# Patient Record
Sex: MALE | Race: WHITE | NOT HISPANIC OR LATINO | Employment: OTHER | URBAN - METROPOLITAN AREA
[De-identification: names, ages, dates, MRNs, and addresses within clinical notes are randomized per-mention and may not be internally consistent; named-entity substitution may affect disease eponyms.]

---

## 2017-01-09 ENCOUNTER — TELEPHONE (OUTPATIENT)
Dept: PULMONOLOGY | Facility: CLINIC | Age: 52
End: 2017-01-09

## 2017-01-09 NOTE — TELEPHONE ENCOUNTER
Spoke to Jacqueline with [facility]   Gave information that patient was discharged from hospital with order ventolin rescue inhler : 2(two) puffs every 4-6 hours PRN cough, wheezing and , shortness of breath.    Dr Hunter Menchaca ordered Breo   Patient is to be on both inhalers.   Patient's scripts will be going to [facility] in [place] not  [facility]  (that company is going out of business) per Frankie with [facility] in [place], [place]

## 2017-01-09 NOTE — TELEPHONE ENCOUNTER
----- Message from Hattie Avelar sent at 1/9/2017 10:01 AM CST -----  Jacqueline ([facility]) called regarding a RX that was sent over on the 29th/stated that she need some clarity on the inhaler/pls call back at [phone]

## 2017-03-22 RX ORDER — DICLOFENAC SODIUM 75 MG/1
TABLET, DELAYED RELEASE ORAL
Qty: 60 TABLET | Refills: 0 | OUTPATIENT
Start: 2017-03-22

## 2019-02-22 ENCOUNTER — DOCUMENTATION ONLY (OUTPATIENT)
Dept: FAMILY MEDICINE | Facility: CLINIC | Age: 54
End: 2019-02-22

## 2019-02-22 NOTE — PROGRESS NOTES
Health Maintenance Due   Topic Date Due    Hepatitis C Screening  1965    Lipid Panel  1965    TETANUS VACCINE  07/07/1982    Colonoscopy  07/07/2014    Influenza Vaccine  08/01/2018

## 2019-10-08 ENCOUNTER — HOSPITAL ENCOUNTER (OUTPATIENT)
Facility: HOSPITAL | Age: 54
Discharge: HOME | End: 2019-10-09
Attending: EMERGENCY MEDICINE | Admitting: FAMILY MEDICINE
Payer: MEDICAID

## 2019-10-08 DIAGNOSIS — J44.1 COPD EXACERBATION: Primary | ICD-10-CM

## 2019-10-08 DIAGNOSIS — R06.00 DYSPNEA: ICD-10-CM

## 2019-10-08 PROBLEM — I10 ESSENTIAL HYPERTENSION: Status: ACTIVE | Noted: 2019-10-08

## 2019-10-08 LAB
ALBUMIN SERPL BCP-MCNC: 3.9 G/DL (ref 3.5–5.2)
ALP SERPL-CCNC: 60 U/L (ref 55–135)
ALT SERPL W/O P-5'-P-CCNC: 25 U/L (ref 10–44)
ANION GAP SERPL CALC-SCNC: 7 MMOL/L (ref 8–16)
AST SERPL-CCNC: 21 U/L (ref 10–40)
BASOPHILS # BLD AUTO: 0.02 K/UL (ref 0–0.2)
BASOPHILS NFR BLD: 0.2 % (ref 0–1.9)
BILIRUB SERPL-MCNC: 0.7 MG/DL (ref 0.1–1)
BNP SERPL-MCNC: 360 PG/ML (ref 0–99)
BUN SERPL-MCNC: 15 MG/DL (ref 6–20)
CALCIUM SERPL-MCNC: 8.7 MG/DL (ref 8.7–10.5)
CHLORIDE SERPL-SCNC: 107 MMOL/L (ref 95–110)
CO2 SERPL-SCNC: 26 MMOL/L (ref 23–29)
CREAT SERPL-MCNC: 0.8 MG/DL (ref 0.5–1.4)
D DIMER PPP IA.FEU-MCNC: 0.38 UG/ML FEU
DIFFERENTIAL METHOD: ABNORMAL
EOSINOPHIL # BLD AUTO: 0 K/UL (ref 0–0.5)
EOSINOPHIL NFR BLD: 0 % (ref 0–8)
ERYTHROCYTE [DISTWIDTH] IN BLOOD BY AUTOMATED COUNT: 13.2 % (ref 11.5–14.5)
EST. GFR  (AFRICAN AMERICAN): >60 ML/MIN/1.73 M^2
EST. GFR  (NON AFRICAN AMERICAN): >60 ML/MIN/1.73 M^2
GLUCOSE SERPL-MCNC: 174 MG/DL (ref 70–110)
HCT VFR BLD AUTO: 42.7 % (ref 40–54)
HGB BLD-MCNC: 13.8 G/DL (ref 14–18)
IMM GRANULOCYTES # BLD AUTO: 0.02 K/UL (ref 0–0.04)
IMM GRANULOCYTES NFR BLD AUTO: 0.2 % (ref 0–0.5)
LYMPHOCYTES # BLD AUTO: 0.7 K/UL (ref 1–4.8)
LYMPHOCYTES NFR BLD: 8.2 % (ref 18–48)
MCH RBC QN AUTO: 30.7 PG (ref 27–31)
MCHC RBC AUTO-ENTMCNC: 32.3 G/DL (ref 32–36)
MCV RBC AUTO: 95 FL (ref 82–98)
MONOCYTES # BLD AUTO: 0.1 K/UL (ref 0.3–1)
MONOCYTES NFR BLD: 1.6 % (ref 4–15)
NEUTROPHILS # BLD AUTO: 8 K/UL (ref 1.8–7.7)
NEUTROPHILS NFR BLD: 89.8 % (ref 38–73)
NRBC BLD-RTO: 0 /100 WBC
PLATELET # BLD AUTO: 193 K/UL (ref 150–350)
PMV BLD AUTO: 10.9 FL (ref 9.2–12.9)
POTASSIUM SERPL-SCNC: 4.4 MMOL/L (ref 3.5–5.1)
PROT SERPL-MCNC: 7.1 G/DL (ref 6–8.4)
RBC # BLD AUTO: 4.5 M/UL (ref 4.6–6.2)
SODIUM SERPL-SCNC: 140 MMOL/L (ref 136–145)
TROPONIN I SERPL DL<=0.01 NG/ML-MCNC: <0.03 NG/ML (ref 0.02–0.04)
TSH SERPL DL<=0.005 MIU/L-ACNC: 0.57 UIU/ML (ref 0.34–5.6)
WBC # BLD AUTO: 8.95 K/UL (ref 3.9–12.7)

## 2019-10-08 PROCEDURE — G0378 HOSPITAL OBSERVATION PER HR: HCPCS

## 2019-10-08 PROCEDURE — 63600175 PHARM REV CODE 636 W HCPCS: Performed by: FAMILY MEDICINE

## 2019-10-08 PROCEDURE — 83880 ASSAY OF NATRIURETIC PEPTIDE: CPT

## 2019-10-08 PROCEDURE — 85025 COMPLETE CBC W/AUTO DIFF WBC: CPT

## 2019-10-08 PROCEDURE — 25000242 PHARM REV CODE 250 ALT 637 W/ HCPCS: Performed by: FAMILY MEDICINE

## 2019-10-08 PROCEDURE — 80053 COMPREHEN METABOLIC PANEL: CPT

## 2019-10-08 PROCEDURE — 96372 THER/PROPH/DIAG INJ SC/IM: CPT | Mod: 59

## 2019-10-08 PROCEDURE — 94640 AIRWAY INHALATION TREATMENT: CPT

## 2019-10-08 PROCEDURE — 25000242 PHARM REV CODE 250 ALT 637 W/ HCPCS: Performed by: EMERGENCY MEDICINE

## 2019-10-08 PROCEDURE — 84443 ASSAY THYROID STIM HORMONE: CPT

## 2019-10-08 PROCEDURE — 99285 EMERGENCY DEPT VISIT HI MDM: CPT | Mod: 25

## 2019-10-08 PROCEDURE — 84484 ASSAY OF TROPONIN QUANT: CPT

## 2019-10-08 PROCEDURE — 25000003 PHARM REV CODE 250: Performed by: FAMILY MEDICINE

## 2019-10-08 PROCEDURE — 85379 FIBRIN DEGRADATION QUANT: CPT

## 2019-10-08 PROCEDURE — 96375 TX/PRO/DX INJ NEW DRUG ADDON: CPT

## 2019-10-08 PROCEDURE — 93005 ELECTROCARDIOGRAM TRACING: CPT

## 2019-10-08 RX ORDER — FLUTICASONE FUROATE AND VILANTEROL 200; 25 UG/1; UG/1
1 POWDER RESPIRATORY (INHALATION) DAILY
Status: DISCONTINUED | OUTPATIENT
Start: 2019-10-09 | End: 2019-10-09 | Stop reason: HOSPADM

## 2019-10-08 RX ORDER — ONDANSETRON 4 MG/1
8 TABLET, ORALLY DISINTEGRATING ORAL EVERY 8 HOURS PRN
Status: DISCONTINUED | OUTPATIENT
Start: 2019-10-08 | End: 2019-10-09 | Stop reason: HOSPADM

## 2019-10-08 RX ORDER — IPRATROPIUM BROMIDE AND ALBUTEROL SULFATE 2.5; .5 MG/3ML; MG/3ML
9 SOLUTION RESPIRATORY (INHALATION)
Status: COMPLETED | OUTPATIENT
Start: 2019-10-08 | End: 2019-10-08

## 2019-10-08 RX ORDER — SODIUM CHLORIDE 0.9 % (FLUSH) 0.9 %
10 SYRINGE (ML) INJECTION
Status: DISCONTINUED | OUTPATIENT
Start: 2019-10-08 | End: 2019-10-09 | Stop reason: HOSPADM

## 2019-10-08 RX ORDER — PANTOPRAZOLE SODIUM 40 MG/1
40 TABLET, DELAYED RELEASE ORAL
Status: DISCONTINUED | OUTPATIENT
Start: 2019-10-09 | End: 2019-10-09 | Stop reason: HOSPADM

## 2019-10-08 RX ORDER — ENOXAPARIN SODIUM 100 MG/ML
40 INJECTION SUBCUTANEOUS EVERY 24 HOURS
Status: DISCONTINUED | OUTPATIENT
Start: 2019-10-08 | End: 2019-10-09 | Stop reason: HOSPADM

## 2019-10-08 RX ORDER — IPRATROPIUM BROMIDE AND ALBUTEROL SULFATE 2.5; .5 MG/3ML; MG/3ML
3 SOLUTION RESPIRATORY (INHALATION) EVERY 4 HOURS
Status: DISCONTINUED | OUTPATIENT
Start: 2019-10-08 | End: 2019-10-08

## 2019-10-08 RX ORDER — FLUTICASONE PROPIONATE 50 MCG
2 SPRAY, SUSPENSION (ML) NASAL DAILY PRN
Status: DISCONTINUED | OUTPATIENT
Start: 2019-10-08 | End: 2019-10-09 | Stop reason: HOSPADM

## 2019-10-08 RX ORDER — METHYLPREDNISOLONE SOD SUCC 125 MG
80 VIAL (EA) INJECTION EVERY 8 HOURS
Status: DISCONTINUED | OUTPATIENT
Start: 2019-10-08 | End: 2019-10-09 | Stop reason: HOSPADM

## 2019-10-08 RX ORDER — SERTRALINE HYDROCHLORIDE 50 MG/1
50 TABLET, FILM COATED ORAL NIGHTLY
Status: DISCONTINUED | OUTPATIENT
Start: 2019-10-08 | End: 2019-10-09 | Stop reason: HOSPADM

## 2019-10-08 RX ORDER — ASPIRIN 81 MG/1
81 TABLET ORAL DAILY
Status: DISCONTINUED | OUTPATIENT
Start: 2019-10-08 | End: 2019-10-09 | Stop reason: HOSPADM

## 2019-10-08 RX ORDER — ACETAMINOPHEN 325 MG/1
650 TABLET ORAL EVERY 8 HOURS PRN
Status: DISCONTINUED | OUTPATIENT
Start: 2019-10-08 | End: 2019-10-09 | Stop reason: HOSPADM

## 2019-10-08 RX ORDER — IPRATROPIUM BROMIDE AND ALBUTEROL SULFATE 2.5; .5 MG/3ML; MG/3ML
3 SOLUTION RESPIRATORY (INHALATION) EVERY 4 HOURS
Status: DISCONTINUED | OUTPATIENT
Start: 2019-10-09 | End: 2019-10-09

## 2019-10-08 RX ORDER — HYDROCODONE BITARTRATE AND ACETAMINOPHEN 5; 325 MG/1; MG/1
1 TABLET ORAL EVERY 4 HOURS PRN
Status: DISCONTINUED | OUTPATIENT
Start: 2019-10-08 | End: 2019-10-09 | Stop reason: HOSPADM

## 2019-10-08 RX ORDER — BACLOFEN 10 MG/1
10 TABLET ORAL 3 TIMES DAILY
Status: DISCONTINUED | OUTPATIENT
Start: 2019-10-08 | End: 2019-10-09 | Stop reason: HOSPADM

## 2019-10-08 RX ORDER — POLYETHYLENE GLYCOL 3350 17 G/17G
17 POWDER, FOR SOLUTION ORAL DAILY
Status: DISCONTINUED | OUTPATIENT
Start: 2019-10-09 | End: 2019-10-09 | Stop reason: HOSPADM

## 2019-10-08 RX ORDER — MONTELUKAST SODIUM 10 MG/1
10 TABLET ORAL NIGHTLY
Status: DISCONTINUED | OUTPATIENT
Start: 2019-10-08 | End: 2019-10-09 | Stop reason: HOSPADM

## 2019-10-08 RX ADMIN — ENOXAPARIN SODIUM 40 MG: 100 INJECTION SUBCUTANEOUS at 10:10

## 2019-10-08 RX ADMIN — METHYLPREDNISOLONE SODIUM SUCCINATE 80 MG: 125 INJECTION, POWDER, FOR SOLUTION INTRAMUSCULAR; INTRAVENOUS at 10:10

## 2019-10-08 RX ADMIN — IPRATROPIUM BROMIDE AND ALBUTEROL SULFATE 3 ML: .5; 3 SOLUTION RESPIRATORY (INHALATION) at 11:10

## 2019-10-08 RX ADMIN — SERTRALINE HYDROCHLORIDE 50 MG: 50 TABLET ORAL at 10:10

## 2019-10-08 RX ADMIN — MONTELUKAST SODIUM 10 MG: 10 TABLET, COATED ORAL at 10:10

## 2019-10-08 RX ADMIN — ASPIRIN 81 MG: 81 TABLET, DELAYED RELEASE ORAL at 10:10

## 2019-10-08 RX ADMIN — IPRATROPIUM BROMIDE AND ALBUTEROL SULFATE 9 ML: .5; 3 SOLUTION RESPIRATORY (INHALATION) at 05:10

## 2019-10-08 RX ADMIN — BACLOFEN 10 MG: 10 TABLET ORAL at 10:10

## 2019-10-08 RX ADMIN — IBUPROFEN 600 MG: 400 TABLET, FILM COATED ORAL at 10:10

## 2019-10-08 NOTE — H&P
[facility] Medicine  History & Physical    Patient Name: Renard Jimenez  MRN: [id number]  Admission Date: 10/8/2019  Attending Physician: Marline Kate MD  Primary Care Provider: Lisa Olivera MD         Patient information was obtained from patient and ER records.     Subjective:     Principal Problem:<principal problem not specified>    Chief Complaint:   Chief Complaint   Patient presents with    Shortness of Breath        HPI:  Patient was in the custody of [place] police department when he presented to the emergency room with complaints of shortness of breath.  Patient reports that over the past 48 hr he has not had his medication.  He reports having increasing shortness of breath that started this morning.  He admits that he is on chronic oxygen therapy but has not had his oxygen over the past 48 hr.  He admits that he was using his inhalers more frequently prior to his incarceration.  He denies having any chest pain shortness of breath with the symptoms.  He admits to a productive cough.  He denies having any fever.    Past Medical History:   Diagnosis Date    Asthma     CHF (congestive heart failure)     COPD (chronic obstructive pulmonary disease)     Hypertension     MI, old        Past Surgical History:   Procedure Laterality Date    TONSILLECTOMY         Review of patient's allergies indicates:   Allergen Reactions    Maple flavor Other (See Comments)     Throat swelling         No current facility-administered medications on file prior to encounter.      Current Outpatient Medications on File Prior to Encounter   Medication Sig    albuterol-ipratropium 2.5mg-0.5mg/3mL (DUO-NEB) 0.5 mg-3 mg(2.5 mg base)/3 mL nebulizer solution Take 3 mLs by nebulization every 4 (four) hours.    aspirin (ECOTRIN) 81 MG EC tablet Take 81 mg by mouth once daily.    baclofen (LIORESAL) 10 MG tablet Take 1 tablet (10 mg total) by mouth 3 (three) times daily.    diclofenac (VOLTAREN)  75 MG EC tablet Take 75 mg by mouth 2 (two) times daily.    fluticasone (FLONASE) 50 mcg/actuation nasal spray 2 sprays by Each Nare route once daily.    fluticasone-vilanterol (BREO ELLIPTA) 200-25 mcg/dose DsDv diskus inhaler Inhale 1 puff into the lungs once daily.    ipratropium (ATROVENT) 0.02 % nebulizer solution Take 2.5 mLs (500 mcg total) by nebulization 3 (three) times daily.    montelukast (SINGULAIR) 10 mg tablet Take 1 tablet (10 mg total) by mouth every evening.    pantoprazole (PROTONIX) 40 MG tablet Take 40 mg by mouth once daily.    predniSONE (DELTASONE) 20 MG tablet 3 for 3 days then 2 for 3 days then one for 3 days and repeat for breathing problems    sertraline (ZOLOFT) 50 MG tablet Take 50 mg by mouth every evening.     Family History     None        Tobacco Use    Smoking status: Former Smoker     Packs/day: 0.00     Years: 30.00     Pack years: 0.00   Substance and Sexual Activity    Alcohol use: No     Comment: occ    Drug use: No    Sexual activity: Yes     Partners: Female     Review of Systems   Constitutional: Negative.    HENT: Negative.    Eyes: Negative.    Respiratory: Positive for shortness of breath and wheezing.    Cardiovascular: Negative.    Gastrointestinal: Negative.    Endocrine: Negative.    Genitourinary: Negative.    Musculoskeletal: Negative.    Skin: Negative.    Allergic/Immunologic: Negative.    Neurological: Negative.    Hematological: Negative.    Psychiatric/Behavioral: Negative.    All other systems reviewed and are negative.    Objective:     Vital Signs (Most Recent):  Temp: 98.8 °F (37.1 °C) (10/08/19 1454)  Pulse: 79 (10/08/19 1738)  Resp: 18 (10/08/19 1738)  BP: (!) 164/74 (10/08/19 1731)  SpO2: 98 % (10/08/19 1738) Vital Signs (24h Range):  Temp:  [98.8 °F (37.1 °C)] 98.8 °F (37.1 °C)  Pulse:  [70-93] 79  Resp:  [15-39] 18  SpO2:  [96 %-99 %] 98 %  BP: (158-190)/() 164/74     Weight: 88.5 kg (195 lb)  Body mass index is 27.98  kg/m².    Physical Exam   Constitutional: He is oriented to person, place, and time. He appears well-developed and well-nourished.   HENT:   Head: Normocephalic and atraumatic.   Right Ear: External ear normal.   Left Ear: External ear normal.   Eyes: Pupils are equal, round, and reactive to light. EOM are normal.   Neck: Normal range of motion. Neck supple.   Cardiovascular: Normal rate, regular rhythm, normal heart sounds and intact distal pulses.   Pulmonary/Chest: Effort normal. He has wheezes.   Abdominal: Soft. Bowel sounds are normal. There is no tenderness. There is no rebound and no guarding.   Musculoskeletal: Normal range of motion.   Neurological: He is alert and oriented to person, place, and time.   Skin: Skin is warm and dry.   Psychiatric: He has a normal mood and affect. His behavior is normal. Judgment and thought content normal.   Nursing note and vitals reviewed.        CRANIAL NERVES     CN III, IV, VI   Pupils are equal, round, and reactive to light.  Extraocular motions are normal.        Significant Labs:   CBC:   Recent Labs   Lab 10/08/19  1629   WBC 8.95   HGB 13.8*   HCT 42.7        CMP:   Recent Labs   Lab 10/08/19  1629      K 4.4      CO2 26   *   BUN 15   CREATININE 0.8   CALCIUM 8.7   PROT 7.1   ALBUMIN 3.9   BILITOT 0.7   ALKPHOS 60   AST 21   ALT 25   ANIONGAP 7*   EGFRNONAA >60.0     Troponin:   Recent Labs   Lab 10/08/19  1629   TROPONINI <0.030     All pertinent labs within the past 24 hours have been reviewed.    Significant Imaging: I have reviewed all pertinent imaging results/findings within the past 24 hours.    Assessment/Plan:     Essential hypertension  Repeat CBC and BMP in the am      Acute and chronic respiratory failure with hypoxia  Patient to continue with neb treatments and oxygen therapy      Pulmonary emphysema  As above      Acute exacerbation of chronic obstructive pulmonary disease (COPD)  Resumed patient's chronic oxygen therapy.  duonebs q 4 hours. Solumedrol 80mg IV q 8 hours        VTE Risk Mitigation (From admission, onward)         Ordered     enoxaparin injection 40 mg  Daily      10/08/19 1846     IP VTE HIGH RISK PATIENT  Once      10/08/19 1846                   Marline Kate MD  Department of Hospital Medicine   [facility]

## 2019-10-08 NOTE — ED PROVIDER NOTES
Encounter Date: 10/8/2019       History     Chief Complaint   Patient presents with    Shortness of Breath     54-year-old male who has a history of asthma, CHF, COPD, hypertension and coronary artery disease, and who is currently in [place] police custody, was brought from the assisted for complaints of shortness of breath that suddenly began 30 min prior to arrival while walking.  The patient denies any fever.  He states he has had some cough productive of yellow sputum.  He has admits to stop smoking for now half months ago and prior to that time smoked 1 pack daily.  He readily admits to wheezing.  No complaints of palpitations.  The patient denies any dependent edema. No orthopnea PND but does have exertional dyspnea.  He states he supposed to be on 2 L of oxygen continuously but has not been on O2 nausea received any of his medications since he has been incarcerated last 48 hr.        Review of patient's allergies indicates:   Allergen Reactions    Maple flavor Other (See Comments)     Throat swelling       Past Medical History:   Diagnosis Date    Asthma     CHF (congestive heart failure)     COPD (chronic obstructive pulmonary disease)     Hypertension     MI, old      Past Surgical History:   Procedure Laterality Date    TONSILLECTOMY       No family history on file.  Social History     Tobacco Use    Smoking status: Former Smoker     Packs/day: 0.00     Years: 30.00     Pack years: 0.00   Substance Use Topics    Alcohol use: No     Comment: occ    Drug use: No     Review of Systems   Constitutional: Negative for chills, diaphoresis and fever.   HENT: Positive for congestion. Negative for ear pain, rhinorrhea, sinus pressure, sinus pain, sore throat and trouble swallowing.    Eyes: Negative for pain.   Respiratory: Positive for cough, shortness of breath and wheezing. Negative for chest tightness and stridor.    Cardiovascular: Negative for chest pain.   Gastrointestinal: Negative for abdominal pain,  constipation, diarrhea, nausea and vomiting.   Genitourinary: Negative for flank pain, frequency and hematuria.   Skin: Negative for pallor, rash and wound.   Neurological: Positive for headaches.   All other systems reviewed and are negative.      Physical Exam     Initial Vitals [10/08/19 1454]   BP Pulse Resp Temp SpO2   (!) 190/104 93 20 98.8 °F (37.1 °C) 98 %      MAP       --         Physical Exam    Constitutional: He appears well-developed and well-nourished. He is not diaphoretic. No distress.   HENT:   Head: Normocephalic and atraumatic.   Right Ear: External ear normal.   Left Ear: External ear normal.   Nose: Nose normal.   Mouth/Throat: Oropharynx is clear and moist.   Eyes: Conjunctivae and EOM are normal. Pupils are equal, round, and reactive to light. Right eye exhibits no discharge. Left eye exhibits no discharge. No scleral icterus.   Neck: Normal range of motion. Neck supple. No tracheal deviation present. No JVD present.   Cardiovascular: Normal rate, regular rhythm, normal heart sounds and intact distal pulses. Exam reveals no gallop and no friction rub.    No murmur heard.  Pulmonary/Chest: No accessory muscle usage or stridor. Tachypnea noted. No bradypnea. No respiratory distress. He has wheezes in the right upper field, the right middle field, the right lower field, the left upper field, the left middle field and the left lower field. He has no rhonchi. He has no rales. He exhibits no tenderness, no edema, no swelling and no retraction.   Abdominal: Soft. Bowel sounds are normal. He exhibits no distension and no mass. There is no tenderness. There is no rebound and no guarding.   Genitourinary: Penis normal.   Musculoskeletal: Normal range of motion. He exhibits no edema or tenderness.        Right lower leg: He exhibits no edema.        Left lower leg: He exhibits no edema.   Lymphadenopathy:     He has no cervical adenopathy.   Neurological: He is alert and oriented to person, place, and  time. He has normal strength and normal reflexes. No cranial nerve deficit or sensory deficit. GCS score is 15. GCS eye subscore is 4. GCS verbal subscore is 5. GCS motor subscore is 6.   Skin: Skin is warm and dry. Capillary refill takes less than 2 seconds. No rash noted. No erythema. No pallor.   Psychiatric: He has a normal mood and affect. His behavior is normal. Judgment and thought content normal.         ED Course   Procedures  Labs Reviewed   CBC W/ AUTO DIFFERENTIAL   COMPREHENSIVE METABOLIC PANEL   D DIMER, QUANTITATIVE   TROPONIN I   B-TYPE NATRIURETIC PEPTIDE   TSH          Imaging Results    None                       Attending Attestation:             Attending ED Notes:   This patient presented with shortness of breath from senior living was COPD and CHF, has a chest x-ray showing no evidence of any infiltrate nor any evidence this pulmonary vascular congestion.  The patient's labs showed a TSH of 0.57, blood sugar 174, BNP of 360 and a normal troponin and D-dimer.  The patient received an in the pre-hospital setting a DuoNeb treatment as well as Solu-Medrol and receive further DuoNeb treatments in the ED.  The patient's breath sounds did improve but the concern is that the patient does not have the ability to maintain oxygen and around the clock medication and incarcerated setting.  Therefore he will be seen by Hospital Medicine for probable admission.             Clinical Impression:       ICD-10-CM ICD-9-CM   1. COPD exacerbation J44.1 491.21   2. Dyspnea R06.00 786.09                                Cornel Saldaña Jr., MD  10/08/19 5142

## 2019-10-08 NOTE — SUBJECTIVE & OBJECTIVE
Past Medical History:   Diagnosis Date    Asthma     CHF (congestive heart failure)     COPD (chronic obstructive pulmonary disease)     Hypertension     MI, old        Past Surgical History:   Procedure Laterality Date    TONSILLECTOMY         Review of patient's allergies indicates:   Allergen Reactions    Maple flavor Other (See Comments)     Throat swelling         No current facility-administered medications on file prior to encounter.      Current Outpatient Medications on File Prior to Encounter   Medication Sig    albuterol-ipratropium 2.5mg-0.5mg/3mL (DUO-NEB) 0.5 mg-3 mg(2.5 mg base)/3 mL nebulizer solution Take 3 mLs by nebulization every 4 (four) hours.    aspirin (ECOTRIN) 81 MG EC tablet Take 81 mg by mouth once daily.    baclofen (LIORESAL) 10 MG tablet Take 1 tablet (10 mg total) by mouth 3 (three) times daily.    diclofenac (VOLTAREN) 75 MG EC tablet Take 75 mg by mouth 2 (two) times daily.    fluticasone (FLONASE) 50 mcg/actuation nasal spray 2 sprays by Each Nare route once daily.    fluticasone-vilanterol (BREO ELLIPTA) 200-25 mcg/dose DsDv diskus inhaler Inhale 1 puff into the lungs once daily.    ipratropium (ATROVENT) 0.02 % nebulizer solution Take 2.5 mLs (500 mcg total) by nebulization 3 (three) times daily.    montelukast (SINGULAIR) 10 mg tablet Take 1 tablet (10 mg total) by mouth every evening.    pantoprazole (PROTONIX) 40 MG tablet Take 40 mg by mouth once daily.    predniSONE (DELTASONE) 20 MG tablet 3 for 3 days then 2 for 3 days then one for 3 days and repeat for breathing problems    sertraline (ZOLOFT) 50 MG tablet Take 50 mg by mouth every evening.     Family History     None        Tobacco Use    Smoking status: Former Smoker     Packs/day: 0.00     Years: 30.00     Pack years: 0.00   Substance and Sexual Activity    Alcohol use: No     Comment: occ    Drug use: No    Sexual activity: Yes     Partners: Female     Review of Systems   Constitutional:  Negative.    HENT: Negative.    Eyes: Negative.    Respiratory: Positive for shortness of breath and wheezing.    Cardiovascular: Negative.    Gastrointestinal: Negative.    Endocrine: Negative.    Genitourinary: Negative.    Musculoskeletal: Negative.    Skin: Negative.    Allergic/Immunologic: Negative.    Neurological: Negative.    Hematological: Negative.    Psychiatric/Behavioral: Negative.    All other systems reviewed and are negative.    Objective:     Vital Signs (Most Recent):  Temp: 98.8 °F (37.1 °C) (10/08/19 1454)  Pulse: 79 (10/08/19 1738)  Resp: 18 (10/08/19 1738)  BP: (!) 164/74 (10/08/19 1731)  SpO2: 98 % (10/08/19 1738) Vital Signs (24h Range):  Temp:  [98.8 °F (37.1 °C)] 98.8 °F (37.1 °C)  Pulse:  [70-93] 79  Resp:  [15-39] 18  SpO2:  [96 %-99 %] 98 %  BP: (158-190)/() 164/74     Weight: 88.5 kg (195 lb)  Body mass index is 27.98 kg/m².    Physical Exam   Constitutional: He is oriented to person, place, and time. He appears well-developed and well-nourished.   HENT:   Head: Normocephalic and atraumatic.   Right Ear: External ear normal.   Left Ear: External ear normal.   Eyes: Pupils are equal, round, and reactive to light. EOM are normal.   Neck: Normal range of motion. Neck supple.   Cardiovascular: Normal rate, regular rhythm, normal heart sounds and intact distal pulses.   Pulmonary/Chest: Effort normal. He has wheezes.   Abdominal: Soft. Bowel sounds are normal. There is no tenderness. There is no rebound and no guarding.   Musculoskeletal: Normal range of motion.   Neurological: He is alert and oriented to person, place, and time.   Skin: Skin is warm and dry.   Psychiatric: He has a normal mood and affect. His behavior is normal. Judgment and thought content normal.   Nursing note and vitals reviewed.        CRANIAL NERVES     CN III, IV, VI   Pupils are equal, round, and reactive to light.  Extraocular motions are normal.        Significant Labs:   CBC:   Recent Labs   Lab  10/08/19  1629   WBC 8.95   HGB 13.8*   HCT 42.7        CMP:   Recent Labs   Lab 10/08/19  1629      K 4.4      CO2 26   *   BUN 15   CREATININE 0.8   CALCIUM 8.7   PROT 7.1   ALBUMIN 3.9   BILITOT 0.7   ALKPHOS 60   AST 21   ALT 25   ANIONGAP 7*   EGFRNONAA >60.0     Troponin:   Recent Labs   Lab 10/08/19  1629   TROPONINI <0.030     All pertinent labs within the past 24 hours have been reviewed.    Significant Imaging: I have reviewed all pertinent imaging results/findings within the past 24 hours.

## 2019-10-08 NOTE — HOSPITAL COURSE
Patient admitted with SOB from senior care.  He has COPD and reports he is on 2L NC at home for his COPD which he does not have at senior care.  He was found to be wheezing on exam.  Patient was admitted, started on IV steroids and scheduled nebs.  Wheezing improved, he is 96% on room air at rest and is ambulating watson without issue.  He does not have his inhalers at senior care and thus I have prescribed Advair, Albuterol rescue inhaler and course of po steroids.  I changed Breo to Advair as Advair is covered by his insurance.  Return precautions given.  Recommended O2 to keep saturations greater than 88% if needed.

## 2019-10-08 NOTE — ED NOTES
Pt sitting up on side of bed, aaox4, rrr unlabored, nadn, given urinal as requested, denies any other needs

## 2019-10-09 VITALS
WEIGHT: 244.5 LBS | HEIGHT: 69 IN | RESPIRATION RATE: 18 BRPM | DIASTOLIC BLOOD PRESSURE: 86 MMHG | OXYGEN SATURATION: 95 % | SYSTOLIC BLOOD PRESSURE: 177 MMHG | TEMPERATURE: 98 F | HEART RATE: 95 BPM | BODY MASS INDEX: 36.21 KG/M2

## 2019-10-09 PROBLEM — J44.1 COPD EXACERBATION: Status: RESOLVED | Noted: 2019-10-08 | Resolved: 2019-10-09

## 2019-10-09 LAB
ANION GAP SERPL CALC-SCNC: 13 MMOL/L (ref 8–16)
BASOPHILS # BLD AUTO: 0.01 K/UL (ref 0–0.2)
BASOPHILS NFR BLD: 0.1 % (ref 0–1.9)
BUN SERPL-MCNC: 24 MG/DL (ref 6–20)
CALCIUM SERPL-MCNC: 8.8 MG/DL (ref 8.7–10.5)
CHLORIDE SERPL-SCNC: 101 MMOL/L (ref 95–110)
CO2 SERPL-SCNC: 23 MMOL/L (ref 23–29)
CREAT SERPL-MCNC: 1.2 MG/DL (ref 0.5–1.4)
DIFFERENTIAL METHOD: ABNORMAL
EOSINOPHIL # BLD AUTO: 0 K/UL (ref 0–0.5)
EOSINOPHIL NFR BLD: 0 % (ref 0–8)
ERYTHROCYTE [DISTWIDTH] IN BLOOD BY AUTOMATED COUNT: 13.2 % (ref 11.5–14.5)
EST. GFR  (AFRICAN AMERICAN): >60 ML/MIN/1.73 M^2
EST. GFR  (NON AFRICAN AMERICAN): >60 ML/MIN/1.73 M^2
GLUCOSE SERPL-MCNC: 235 MG/DL (ref 70–110)
HCT VFR BLD AUTO: 41.4 % (ref 40–54)
HGB BLD-MCNC: 13.5 G/DL (ref 14–18)
IMM GRANULOCYTES # BLD AUTO: 0.06 K/UL (ref 0–0.04)
IMM GRANULOCYTES NFR BLD AUTO: 0.4 % (ref 0–0.5)
LYMPHOCYTES # BLD AUTO: 0.8 K/UL (ref 1–4.8)
LYMPHOCYTES NFR BLD: 4.9 % (ref 18–48)
MAGNESIUM SERPL-MCNC: 1.8 MG/DL (ref 1.6–2.6)
MCH RBC QN AUTO: 30.9 PG (ref 27–31)
MCHC RBC AUTO-ENTMCNC: 32.6 G/DL (ref 32–36)
MCV RBC AUTO: 95 FL (ref 82–98)
MONOCYTES # BLD AUTO: 0.3 K/UL (ref 0.3–1)
MONOCYTES NFR BLD: 1.8 % (ref 4–15)
NEUTROPHILS # BLD AUTO: 14.7 K/UL (ref 1.8–7.7)
NEUTROPHILS NFR BLD: 92.8 % (ref 38–73)
NRBC BLD-RTO: 0 /100 WBC
PHOSPHATE SERPL-MCNC: 2.7 MG/DL (ref 2.7–4.5)
PLATELET # BLD AUTO: 213 K/UL (ref 150–350)
PMV BLD AUTO: 10.6 FL (ref 9.2–12.9)
POTASSIUM SERPL-SCNC: 4.2 MMOL/L (ref 3.5–5.1)
RBC # BLD AUTO: 4.37 M/UL (ref 4.6–6.2)
SODIUM SERPL-SCNC: 137 MMOL/L (ref 136–145)
WBC # BLD AUTO: 15.78 K/UL (ref 3.9–12.7)

## 2019-10-09 PROCEDURE — G0378 HOSPITAL OBSERVATION PER HR: HCPCS

## 2019-10-09 PROCEDURE — 83735 ASSAY OF MAGNESIUM: CPT

## 2019-10-09 PROCEDURE — 80048 BASIC METABOLIC PNL TOTAL CA: CPT

## 2019-10-09 PROCEDURE — 25000242 PHARM REV CODE 250 ALT 637 W/ HCPCS: Performed by: FAMILY MEDICINE

## 2019-10-09 PROCEDURE — 85025 COMPLETE CBC W/AUTO DIFF WBC: CPT

## 2019-10-09 PROCEDURE — 63600175 PHARM REV CODE 636 W HCPCS: Performed by: FAMILY MEDICINE

## 2019-10-09 PROCEDURE — 36415 COLL VENOUS BLD VENIPUNCTURE: CPT

## 2019-10-09 PROCEDURE — 25000242 PHARM REV CODE 250 ALT 637 W/ HCPCS: Performed by: INTERNAL MEDICINE

## 2019-10-09 PROCEDURE — 27000221 HC OXYGEN, UP TO 24 HOURS

## 2019-10-09 PROCEDURE — 94640 AIRWAY INHALATION TREATMENT: CPT

## 2019-10-09 PROCEDURE — 25000003 PHARM REV CODE 250: Performed by: FAMILY MEDICINE

## 2019-10-09 PROCEDURE — 84100 ASSAY OF PHOSPHORUS: CPT

## 2019-10-09 PROCEDURE — 96376 TX/PRO/DX INJ SAME DRUG ADON: CPT

## 2019-10-09 PROCEDURE — 94761 N-INVAS EAR/PLS OXIMETRY MLT: CPT

## 2019-10-09 PROCEDURE — 96365 THER/PROPH/DIAG IV INF INIT: CPT

## 2019-10-09 RX ORDER — FLUTICASONE PROPIONATE AND SALMETEROL 100; 50 UG/1; UG/1
1 POWDER RESPIRATORY (INHALATION) 2 TIMES DAILY
Qty: 1 EACH | Refills: 0 | Status: SHIPPED | OUTPATIENT
Start: 2019-10-09 | End: 2022-04-11

## 2019-10-09 RX ORDER — ALBUTEROL SULFATE 90 UG/1
2 AEROSOL, METERED RESPIRATORY (INHALATION) EVERY 6 HOURS PRN
Qty: 18 G | Refills: 0 | Status: SHIPPED | OUTPATIENT
Start: 2019-10-09 | End: 2022-04-11

## 2019-10-09 RX ORDER — IPRATROPIUM BROMIDE AND ALBUTEROL SULFATE 2.5; .5 MG/3ML; MG/3ML
3 SOLUTION RESPIRATORY (INHALATION)
Status: DISCONTINUED | OUTPATIENT
Start: 2019-10-09 | End: 2019-10-09 | Stop reason: HOSPADM

## 2019-10-09 RX ORDER — PREDNISONE 20 MG/1
40 TABLET ORAL DAILY
Qty: 10 TABLET | Refills: 0 | Status: SHIPPED | OUTPATIENT
Start: 2019-10-09 | End: 2022-04-11

## 2019-10-09 RX ADMIN — PANTOPRAZOLE SODIUM 40 MG: 40 TABLET, DELAYED RELEASE ORAL at 06:10

## 2019-10-09 RX ADMIN — IBUPROFEN 600 MG: 400 TABLET, FILM COATED ORAL at 08:10

## 2019-10-09 RX ADMIN — FLUTICASONE FUROATE AND VILANTEROL TRIFENATATE 1 PUFF: 200; 25 POWDER RESPIRATORY (INHALATION) at 07:10

## 2019-10-09 RX ADMIN — METHYLPREDNISOLONE SODIUM SUCCINATE 80 MG: 125 INJECTION, POWDER, FOR SOLUTION INTRAMUSCULAR; INTRAVENOUS at 01:10

## 2019-10-09 RX ADMIN — BACLOFEN 10 MG: 10 TABLET ORAL at 08:10

## 2019-10-09 RX ADMIN — ASPIRIN 81 MG: 81 TABLET, DELAYED RELEASE ORAL at 08:10

## 2019-10-09 RX ADMIN — IPRATROPIUM BROMIDE AND ALBUTEROL SULFATE 3 ML: .5; 3 SOLUTION RESPIRATORY (INHALATION) at 01:10

## 2019-10-09 RX ADMIN — AZITHROMYCIN MONOHYDRATE 500 MG: 500 INJECTION, POWDER, LYOPHILIZED, FOR SOLUTION INTRAVENOUS at 01:10

## 2019-10-09 RX ADMIN — IPRATROPIUM BROMIDE AND ALBUTEROL SULFATE 3 ML: .5; 3 SOLUTION RESPIRATORY (INHALATION) at 03:10

## 2019-10-09 RX ADMIN — IBUPROFEN 600 MG: 400 TABLET, FILM COATED ORAL at 01:10

## 2019-10-09 RX ADMIN — IPRATROPIUM BROMIDE AND ALBUTEROL SULFATE 3 ML: .5; 3 SOLUTION RESPIRATORY (INHALATION) at 07:10

## 2019-10-09 RX ADMIN — METHYLPREDNISOLONE SODIUM SUCCINATE 80 MG: 125 INJECTION, POWDER, FOR SOLUTION INTRAMUSCULAR; INTRAVENOUS at 06:10

## 2019-10-09 NOTE — HPI
Have patient presents to the emergency room from [facility].  She complains of having shortness of breath and productive cough that started this morning.  He admits to having wheezing.  He reports not having any of his pulmonary medications over the past 48 hr secondary to his incarceration.  Patient reports that he is on chronic oxygen therapy at 2 L but has not had his oxygen in the past 48 hr.  He denies having any fever with the symptoms.  He admits that he was using his inhaler prior to his incarceration for increased wheezing.  He denies having any chest pain or nausea vomiting with the symptoms

## 2019-10-09 NOTE — DISCHARGE SUMMARY
[facility]  Discharge Summary      Patient Name: Renard Jimenez  MRN: [id number]  Admission Date: 10/8/2019  Hospital Length of Stay: 0 days  Discharge Date and Time: 10/9/2019  3:18 PM  Attending Physician: No att. providers found   Discharging Provider: Alcides Casey MD  Primary Care Provider: Lisa Olivera MD      HPI:   Have patient presents to the emergency room from [place] at long term.  She complains of having shortness of breath and productive cough that started this morning.  He admits to having wheezing.  He reports not having any of his pulmonary medications over the past 48 hr secondary to his incarceration.  Patient reports that he is on chronic oxygen therapy at 2 L but has not had his oxygen in the past 48 hr.  He denies having any fever with the symptoms.  He admits that he was using his inhaler prior to his incarceration for increased wheezing.  He denies having any chest pain or nausea vomiting with the symptoms    * No surgery found *      Hospital Course:   Patient admitted with SOB from long term.  He has COPD and reports he is on 2L NC at home for his COPD which he does not have at long term.  He was found to be wheezing on exam.  Patient was admitted, started on IV steroids and scheduled nebs.  Wheezing improved, he is 96% on room air at rest and is ambulating watson without issue.  He does not have his inhalers at long term and thus I have prescribed Advair, Albuterol rescue inhaler and course of po steroids.  I changed Breo to Advair as Advair is covered by his insurance.  Return precautions given.  Recommended O2 to keep saturations greater than 88% if needed.     Consults:     No new Assessment & Plan notes have been filed under this hospital service since the last note was generated.  Service: Hospital Medicine    Final Active Diagnoses:    Diagnosis Date Noted POA    Essential hypertension [I10] 10/08/2019 Yes    Pulmonary emphysema [J43.9] 12/29/2016 Yes       Problems Resolved During this Admission:    Diagnosis Date Noted Date Resolved POA    PRINCIPAL PROBLEM:  Acute and chronic respiratory failure with hypoxia [J96.21] 12/29/2016 10/09/2019 Yes    COPD exacerbation [J44.1] 10/08/2019 10/09/2019 Yes    Acute exacerbation of chronic obstructive pulmonary disease (COPD) [J44.1] 08/28/2013 10/09/2019 Yes       Discharged Condition: good    Disposition: Home or Self Care    Follow Up:  Follow-up Information     Lisa Olivera MD In 2 weeks.    Specialty:  Family Medicine  Contact information:  [address] [place] [address]  [facility]  [facility] [address]  [phone]                 Patient Instructions:      Diet Adult Regular     Notify your health care provider if you experience any of the following:  temperature >100.4     Notify your health care provider if you experience any of the following:  persistent nausea and vomiting or diarrhea     Notify your health care provider if you experience any of the following:  severe uncontrolled pain     Notify your health care provider if you experience any of the following:  difficulty breathing or increased cough     Activity as tolerated       Significant Diagnostic Studies: Labs:   CMP   Recent Labs   Lab 10/08/19  1629 10/09/19  0541    137   K 4.4 4.2    101   CO2 26 23   * 235*   BUN 15 24*   CREATININE 0.8 1.2   CALCIUM 8.7 8.8   PROT 7.1  --    ALBUMIN 3.9  --    BILITOT 0.7  --    ALKPHOS 60  --    AST 21  --    ALT 25  --    ANIONGAP 7* 13   ESTGFRAFRICA >60.0 >60.0   EGFRNONAA >60.0 >60.0    and CBC   Recent Labs   Lab 10/08/19  1629 10/09/19  0541   WBC 8.95 15.78*   HGB 13.8* 13.5*   HCT 42.7 41.4    213       Pending Diagnostic Studies:     None         Medications:  Reconciled Home Medications:      Medication List      START taking these medications    albuterol 90 mcg/actuation inhaler  Commonly known as:  PROVENTIL HFA  Inhale 2 puffs into the lungs every 6 (six) hours as  needed for Wheezing. Rescue     fluticasone-salmeterol 100-50 mcg/dose 100-50 mcg/dose diskus inhaler  Commonly known as:  ADVAIR DISKUS  Inhale 1 puff into the lungs 2 (two) times daily. Controller  Replaces:  fluticasone furoate-vilanterol 200-25 mcg/dose Dsdv diskus inhaler        CHANGE how you take these medications    predniSONE 20 MG tablet  Commonly known as:  DELTASONE  Take 2 tablets (40 mg total) by mouth once daily.  What changed:    · how much to take  · how to take this  · when to take this  · additional instructions        CONTINUE taking these medications    albuterol-ipratropium 2.5 mg-0.5 mg/3 mL nebulizer solution  Commonly known as:  DUO-NEB  Take 3 mLs by nebulization every 4 (four) hours.     aspirin 81 MG EC tablet  Commonly known as:  ECOTRIN  Take 81 mg by mouth once daily.     baclofen 10 MG tablet  Commonly known as:  LIORESAL  Take 1 tablet (10 mg total) by mouth 3 (three) times daily.     diclofenac 75 MG EC tablet  Commonly known as:  VOLTAREN  Take 75 mg by mouth 2 (two) times daily.     fluticasone propionate 50 mcg/actuation nasal spray  Commonly known as:  FLONASE  2 sprays by Each Nare route once daily.     ipratropium 0.02 % nebulizer solution  Commonly known as:  ATROVENT  Take 2.5 mLs (500 mcg total) by nebulization 3 (three) times daily.     montelukast 10 mg tablet  Commonly known as:  SINGULAIR  Take 1 tablet (10 mg total) by mouth every evening.     pantoprazole 40 MG tablet  Commonly known as:  PROTONIX  Take 40 mg by mouth once daily.     sertraline 50 MG tablet  Commonly known as:  ZOLOFT  Take 50 mg by mouth every evening.        STOP taking these medications    fluticasone furoate-vilanterol 200-25 mcg/dose Dsdv diskus inhaler  Commonly known as:  BREO ELLIPTA  Replaced by:  fluticasone-salmeterol 100-50 mcg/dose 100-50 mcg/dose diskus inhaler            Indwelling Lines/Drains at time of discharge:   Lines/Drains/Airways     None                 Time spent on the  discharge of patient: 28 minutes  Patient was seen and examined on the date of discharge and determined to be suitable for discharge.         Alcides Casey MD  Department of Hospital Medicine  [facility] hospital

## 2019-10-09 NOTE — PLAN OF CARE
10/08/19 8223   Patient Assessment/Suction   Level of Consciousness (AVPU) alert   All Lung Fields Breath Sounds clear;equal bilaterally   PRE-TX-O2   O2 Device (Oxygen Therapy) room air   SpO2 97 %   Pulse 85   Resp 16   Aerosol Therapy   $ Aerosol Therapy Charges Aerosol Treatment   Respiratory Treatment Status (SVN) given   Treatment Route (SVN) mask   Patient Position (SVN) semi-Mijares's   Post Treatment Assessment (SVN) breath sounds unchanged   Signs of Intolerance (SVN) none   Breath Sounds Post-Respiratory Treatment   Post-treatment Heart Rate (beats/min) 80   Post-treatment Resp Rate (breaths/min) 16

## 2019-10-09 NOTE — PLAN OF CARE
Problem: Adjustment to Illness COPD (Chronic Obstructive Pulmonary Disease)  Goal: Optimal Chronic Illness Coping  Outcome: Ongoing, Progressing     Problem: Functional Ability Impaired COPD (Chronic Obstructive Pulmonary Disease)  Goal: Optimal Level of Functional [place]  Outcome: Ongoing, Progressing     Problem: Infection COPD (Chronic Obstructive Pulmonary Disease)  Goal: Absence of Infection Signs/Symptoms  Outcome: Ongoing, Progressing

## 2019-10-10 NOTE — CARE UPDATE
Readmission Prevention    PC was requested to provide a PCP appt upon dc for pt.  PC attempted to call pt's available phone numbers.  [phone] was no longer available.  [phone] was no longer in service. Pt was documented as in longterm prior to ED.  PC unable to get in touch with pt; PC will mail to available address [facility] information to make an appt once he is released.    Melissa CARTER, LPN    Transitions of Care Program  10/10/2019, 3:16 pm

## 2022-04-11 ENCOUNTER — ANESTHESIA EVENT (OUTPATIENT)
Dept: SURGERY | Facility: HOSPITAL | Age: 57
End: 2022-04-11
Payer: MEDICAID

## 2022-04-11 ENCOUNTER — HOSPITAL ENCOUNTER (OUTPATIENT)
Facility: HOSPITAL | Age: 57
Discharge: HOME | End: 2022-04-12
Attending: EMERGENCY MEDICINE | Admitting: HOSPITALIST
Payer: MEDICAID

## 2022-04-11 ENCOUNTER — ANESTHESIA (OUTPATIENT)
Dept: SURGERY | Facility: HOSPITAL | Age: 57
End: 2022-04-11
Payer: MEDICAID

## 2022-04-11 DIAGNOSIS — K81.0 ACUTE CHOLECYSTITIS: Primary | ICD-10-CM

## 2022-04-11 DIAGNOSIS — R06.02 SOB (SHORTNESS OF BREATH): ICD-10-CM

## 2022-04-11 DIAGNOSIS — R07.9 CHEST PAIN: ICD-10-CM

## 2022-04-11 DIAGNOSIS — J42 CHRONIC BRONCHITIS, UNSPECIFIED CHRONIC BRONCHITIS TYPE: ICD-10-CM

## 2022-04-11 DIAGNOSIS — K81.0 ACUTE GANGRENOUS CHOLECYSTITIS: ICD-10-CM

## 2022-04-11 PROBLEM — E87.1 HYPONATREMIA: Status: ACTIVE | Noted: 2022-04-11

## 2022-04-11 LAB
ALBUMIN SERPL BCP-MCNC: 3.5 G/DL (ref 3.5–5.2)
ALP SERPL-CCNC: 91 U/L (ref 55–135)
ALT SERPL W/O P-5'-P-CCNC: 17 U/L (ref 10–44)
ANION GAP SERPL CALC-SCNC: 8 MMOL/L (ref 8–16)
AST SERPL-CCNC: 18 U/L (ref 10–40)
BACTERIA #/AREA URNS HPF: NORMAL /HPF
BASOPHILS # BLD AUTO: 0.06 K/UL (ref 0–0.2)
BASOPHILS NFR BLD: 0.3 % (ref 0–1.9)
BILIRUB SERPL-MCNC: 2.4 MG/DL (ref 0.1–1)
BILIRUB UR QL STRIP: NEGATIVE
BNP SERPL-MCNC: 126 PG/ML (ref 0–99)
BUN SERPL-MCNC: 11 MG/DL (ref 6–20)
CALCIUM SERPL-MCNC: 9.1 MG/DL (ref 8.7–10.5)
CHLORIDE SERPL-SCNC: 92 MMOL/L (ref 95–110)
CLARITY UR: CLEAR
CO2 SERPL-SCNC: 28 MMOL/L (ref 23–29)
COLOR UR: YELLOW
CREAT SERPL-MCNC: 0.8 MG/DL (ref 0.5–1.4)
DIFFERENTIAL METHOD: ABNORMAL
EOSINOPHIL # BLD AUTO: 0 K/UL (ref 0–0.5)
EOSINOPHIL NFR BLD: 0.2 % (ref 0–8)
ERYTHROCYTE [DISTWIDTH] IN BLOOD BY AUTOMATED COUNT: 13.5 % (ref 11.5–14.5)
EST. GFR  (AFRICAN AMERICAN): >60 ML/MIN/1.73 M^2
EST. GFR  (NON AFRICAN AMERICAN): >60 ML/MIN/1.73 M^2
GLUCOSE SERPL-MCNC: 109 MG/DL (ref 70–110)
GLUCOSE UR QL STRIP: NEGATIVE
HCT VFR BLD AUTO: 51.4 % (ref 40–54)
HGB BLD-MCNC: 17 G/DL (ref 14–18)
HGB UR QL STRIP: ABNORMAL
IMM GRANULOCYTES # BLD AUTO: 0.11 K/UL (ref 0–0.04)
IMM GRANULOCYTES NFR BLD AUTO: 0.5 % (ref 0–0.5)
KETONES UR QL STRIP: NEGATIVE
LACTATE SERPL-SCNC: 1.5 MMOL/L (ref 0.5–2.2)
LEUKOCYTE ESTERASE UR QL STRIP: ABNORMAL
LIPASE SERPL-CCNC: 4 U/L (ref 4–60)
LYMPHOCYTES # BLD AUTO: 0.9 K/UL (ref 1–4.8)
LYMPHOCYTES NFR BLD: 4.1 % (ref 18–48)
MCH RBC QN AUTO: 30.5 PG (ref 27–31)
MCHC RBC AUTO-ENTMCNC: 33.1 G/DL (ref 32–36)
MCV RBC AUTO: 92 FL (ref 82–98)
MICROSCOPIC COMMENT: NORMAL
MONOCYTES # BLD AUTO: 1.6 K/UL (ref 0.3–1)
MONOCYTES NFR BLD: 7.4 % (ref 4–15)
NEUTROPHILS # BLD AUTO: 18.8 K/UL (ref 1.8–7.7)
NEUTROPHILS NFR BLD: 87.5 % (ref 38–73)
NITRITE UR QL STRIP: NEGATIVE
NRBC BLD-RTO: 0 /100 WBC
PH UR STRIP: 6 [PH] (ref 5–8)
PLATELET # BLD AUTO: 195 K/UL (ref 150–450)
PMV BLD AUTO: 10 FL (ref 9.2–12.9)
POTASSIUM SERPL-SCNC: 4.9 MMOL/L (ref 3.5–5.1)
PROT SERPL-MCNC: 7.2 G/DL (ref 6–8.4)
PROT UR QL STRIP: NEGATIVE
RBC # BLD AUTO: 5.58 M/UL (ref 4.6–6.2)
RBC #/AREA URNS HPF: 0 /HPF (ref 0–4)
SARS-COV-2 RDRP RESP QL NAA+PROBE: NEGATIVE
SODIUM SERPL-SCNC: 128 MMOL/L (ref 136–145)
SP GR UR STRIP: <=1.005 (ref 1–1.03)
TROPONIN I SERPL DL<=0.01 NG/ML-MCNC: <0.006 NG/ML (ref 0–0.03)
URN SPEC COLLECT METH UR: ABNORMAL
UROBILINOGEN UR STRIP-ACNC: NEGATIVE EU/DL
WBC # BLD AUTO: 21.52 K/UL (ref 3.9–12.7)
WBC #/AREA URNS HPF: 3 /HPF (ref 0–5)

## 2022-04-11 PROCEDURE — 37000008 HC ANESTHESIA 1ST 15 MINUTES: Performed by: SURGERY

## 2022-04-11 PROCEDURE — 25000003 PHARM REV CODE 250: Performed by: SURGERY

## 2022-04-11 PROCEDURE — G0378 HOSPITAL OBSERVATION PER HR: HCPCS

## 2022-04-11 PROCEDURE — 71000033 HC RECOVERY, INTIAL HOUR: Performed by: SURGERY

## 2022-04-11 PROCEDURE — 99900104 DSU ONLY-NO CHARGE-EA ADD'L HR (STAT): Performed by: SURGERY

## 2022-04-11 PROCEDURE — 25000242 PHARM REV CODE 250 ALT 637 W/ HCPCS: Performed by: EMERGENCY MEDICINE

## 2022-04-11 PROCEDURE — 81000 URINALYSIS NONAUTO W/SCOPE: CPT | Performed by: EMERGENCY MEDICINE

## 2022-04-11 PROCEDURE — 99900103 DSU ONLY-NO CHARGE-INITIAL HR (STAT): Performed by: SURGERY

## 2022-04-11 PROCEDURE — 63600175 PHARM REV CODE 636 W HCPCS: Performed by: HOSPITALIST

## 2022-04-11 PROCEDURE — 36000708 HC OR TIME LEV III 1ST 15 MIN: Performed by: SURGERY

## 2022-04-11 PROCEDURE — 12000002 HC ACUTE/MED SURGE SEMI-PRIVATE ROOM

## 2022-04-11 PROCEDURE — 27201423 OPTIME MED/SURG SUP & DEVICES STERILE SUPPLY: Performed by: SURGERY

## 2022-04-11 PROCEDURE — 84484 ASSAY OF TROPONIN QUANT: CPT | Performed by: EMERGENCY MEDICINE

## 2022-04-11 PROCEDURE — 99223 PR INITIAL HOSPITAL CARE,LEVL III: ICD-10-PCS | Mod: 57,,, | Performed by: SURGERY

## 2022-04-11 PROCEDURE — 27000221 HC OXYGEN, UP TO 24 HOURS

## 2022-04-11 PROCEDURE — 36415 COLL VENOUS BLD VENIPUNCTURE: CPT | Performed by: EMERGENCY MEDICINE

## 2022-04-11 PROCEDURE — D9220A PRA ANESTHESIA: Mod: ANES,,, | Performed by: ANESTHESIOLOGY

## 2022-04-11 PROCEDURE — S4991 NICOTINE PATCH NONLEGEND: HCPCS | Performed by: EMERGENCY MEDICINE

## 2022-04-11 PROCEDURE — 94761 N-INVAS EAR/PLS OXIMETRY MLT: CPT | Mod: 59

## 2022-04-11 PROCEDURE — 47562 PR LAP,CHOLECYSTECTOMY: ICD-10-PCS | Mod: 22,,, | Performed by: SURGERY

## 2022-04-11 PROCEDURE — 96365 THER/PROPH/DIAG IV INF INIT: CPT

## 2022-04-11 PROCEDURE — 71000039 HC RECOVERY, EACH ADD'L HOUR: Performed by: SURGERY

## 2022-04-11 PROCEDURE — 83690 ASSAY OF LIPASE: CPT | Performed by: EMERGENCY MEDICINE

## 2022-04-11 PROCEDURE — 25000003 PHARM REV CODE 250: Performed by: EMERGENCY MEDICINE

## 2022-04-11 PROCEDURE — 85025 COMPLETE CBC W/AUTO DIFF WBC: CPT | Performed by: EMERGENCY MEDICINE

## 2022-04-11 PROCEDURE — 63600175 PHARM REV CODE 636 W HCPCS: Performed by: NURSE ANESTHETIST, CERTIFIED REGISTERED

## 2022-04-11 PROCEDURE — 47562 LAPAROSCOPIC CHOLECYSTECTOMY: CPT | Mod: 22,,, | Performed by: SURGERY

## 2022-04-11 PROCEDURE — 80053 COMPREHEN METABOLIC PANEL: CPT | Performed by: EMERGENCY MEDICINE

## 2022-04-11 PROCEDURE — 94799 UNLISTED PULMONARY SVC/PX: CPT

## 2022-04-11 PROCEDURE — 88304 TISSUE EXAM BY PATHOLOGIST: CPT | Performed by: PATHOLOGY

## 2022-04-11 PROCEDURE — 99223 1ST HOSP IP/OBS HIGH 75: CPT | Mod: 57,,, | Performed by: SURGERY

## 2022-04-11 PROCEDURE — 96372 THER/PROPH/DIAG INJ SC/IM: CPT | Mod: 59 | Performed by: HOSPITALIST

## 2022-04-11 PROCEDURE — 87040 BLOOD CULTURE FOR BACTERIA: CPT | Mod: 59 | Performed by: EMERGENCY MEDICINE

## 2022-04-11 PROCEDURE — 00790 ANES IPER UPR ABD NOS: CPT | Performed by: SURGERY

## 2022-04-11 PROCEDURE — D9220A PRA ANESTHESIA: ICD-10-PCS | Mod: ANES,,, | Performed by: ANESTHESIOLOGY

## 2022-04-11 PROCEDURE — 83605 ASSAY OF LACTIC ACID: CPT | Performed by: EMERGENCY MEDICINE

## 2022-04-11 PROCEDURE — 25000003 PHARM REV CODE 250: Performed by: ANESTHESIOLOGY

## 2022-04-11 PROCEDURE — D9220A PRA ANESTHESIA: Mod: CRNA,,, | Performed by: NURSE ANESTHETIST, CERTIFIED REGISTERED

## 2022-04-11 PROCEDURE — 63600175 PHARM REV CODE 636 W HCPCS: Performed by: EMERGENCY MEDICINE

## 2022-04-11 PROCEDURE — 88304 PR  SURG PATH,LEVEL III: ICD-10-PCS | Mod: 26,,, | Performed by: PATHOLOGY

## 2022-04-11 PROCEDURE — U0002 COVID-19 LAB TEST NON-CDC: HCPCS | Performed by: EMERGENCY MEDICINE

## 2022-04-11 PROCEDURE — 25000003 PHARM REV CODE 250: Performed by: NURSE ANESTHETIST, CERTIFIED REGISTERED

## 2022-04-11 PROCEDURE — 83880 ASSAY OF NATRIURETIC PEPTIDE: CPT | Performed by: EMERGENCY MEDICINE

## 2022-04-11 PROCEDURE — 37000009 HC ANESTHESIA EA ADD 15 MINS: Performed by: SURGERY

## 2022-04-11 PROCEDURE — 63600175 PHARM REV CODE 636 W HCPCS: Performed by: ANESTHESIOLOGY

## 2022-04-11 PROCEDURE — 99285 EMERGENCY DEPT VISIT HI MDM: CPT | Mod: 25

## 2022-04-11 PROCEDURE — 25500020 PHARM REV CODE 255: Performed by: EMERGENCY MEDICINE

## 2022-04-11 PROCEDURE — D9220A PRA ANESTHESIA: ICD-10-PCS | Mod: CRNA,,, | Performed by: NURSE ANESTHETIST, CERTIFIED REGISTERED

## 2022-04-11 PROCEDURE — 63600175 PHARM REV CODE 636 W HCPCS: Performed by: SURGERY

## 2022-04-11 PROCEDURE — 88304 TISSUE EXAM BY PATHOLOGIST: CPT | Mod: 26,,, | Performed by: PATHOLOGY

## 2022-04-11 PROCEDURE — 94640 AIRWAY INHALATION TREATMENT: CPT

## 2022-04-11 PROCEDURE — 36000709 HC OR TIME LEV III EA ADD 15 MIN: Performed by: SURGERY

## 2022-04-11 RX ORDER — BUPIVACAINE HYDROCHLORIDE AND EPINEPHRINE 2.5; 5 MG/ML; UG/ML
INJECTION, SOLUTION EPIDURAL; INFILTRATION; INTRACAUDAL; PERINEURAL
Status: DISCONTINUED | OUTPATIENT
Start: 2022-04-11 | End: 2022-04-11 | Stop reason: HOSPADM

## 2022-04-11 RX ORDER — EPHEDRINE SULFATE 50 MG/ML
INJECTION, SOLUTION INTRAVENOUS
Status: DISCONTINUED | OUTPATIENT
Start: 2022-04-11 | End: 2022-04-11

## 2022-04-11 RX ORDER — ONDANSETRON 2 MG/ML
4 INJECTION INTRAMUSCULAR; INTRAVENOUS EVERY 12 HOURS PRN
Status: DISCONTINUED | OUTPATIENT
Start: 2022-04-11 | End: 2022-04-12 | Stop reason: HOSPADM

## 2022-04-11 RX ORDER — SIMETHICONE 80 MG
1 TABLET,CHEWABLE ORAL 4 TIMES DAILY PRN
Status: DISCONTINUED | OUTPATIENT
Start: 2022-04-11 | End: 2022-04-12 | Stop reason: HOSPADM

## 2022-04-11 RX ORDER — SODIUM,POTASSIUM PHOSPHATES 280-250MG
2 POWDER IN PACKET (EA) ORAL
Status: DISCONTINUED | OUTPATIENT
Start: 2022-04-11 | End: 2022-04-12 | Stop reason: HOSPADM

## 2022-04-11 RX ORDER — GLUCAGON 1 MG
1 KIT INJECTION
Status: DISCONTINUED | OUTPATIENT
Start: 2022-04-11 | End: 2022-04-12 | Stop reason: HOSPADM

## 2022-04-11 RX ORDER — SODIUM CHLORIDE 9 MG/ML
INJECTION, SOLUTION INTRAVENOUS CONTINUOUS
Status: DISCONTINUED | OUTPATIENT
Start: 2022-04-11 | End: 2022-04-11

## 2022-04-11 RX ORDER — LANOLIN ALCOHOL/MO/W.PET/CERES
800 CREAM (GRAM) TOPICAL
Status: DISCONTINUED | OUTPATIENT
Start: 2022-04-11 | End: 2022-04-12 | Stop reason: HOSPADM

## 2022-04-11 RX ORDER — IBUPROFEN 200 MG
16 TABLET ORAL
Status: DISCONTINUED | OUTPATIENT
Start: 2022-04-11 | End: 2022-04-12 | Stop reason: HOSPADM

## 2022-04-11 RX ORDER — PROPOFOL 10 MG/ML
VIAL (ML) INTRAVENOUS
Status: DISCONTINUED | OUTPATIENT
Start: 2022-04-11 | End: 2022-04-11

## 2022-04-11 RX ORDER — LIDOCAINE HCL/PF 100 MG/5ML
SYRINGE (ML) INTRAVENOUS
Status: DISCONTINUED | OUTPATIENT
Start: 2022-04-11 | End: 2022-04-11

## 2022-04-11 RX ORDER — NALOXONE HCL 0.4 MG/ML
0.02 VIAL (ML) INJECTION
Status: DISCONTINUED | OUTPATIENT
Start: 2022-04-11 | End: 2022-04-12 | Stop reason: HOSPADM

## 2022-04-11 RX ORDER — MUPIROCIN 20 MG/G
1 OINTMENT TOPICAL 2 TIMES DAILY
Status: DISCONTINUED | OUTPATIENT
Start: 2022-04-11 | End: 2022-04-12 | Stop reason: HOSPADM

## 2022-04-11 RX ORDER — IPRATROPIUM BROMIDE AND ALBUTEROL SULFATE 2.5; .5 MG/3ML; MG/3ML
3 SOLUTION RESPIRATORY (INHALATION)
Status: COMPLETED | OUTPATIENT
Start: 2022-04-11 | End: 2022-04-11

## 2022-04-11 RX ORDER — MORPHINE SULFATE 2 MG/ML
2 INJECTION, SOLUTION INTRAMUSCULAR; INTRAVENOUS EVERY 4 HOURS PRN
Status: DISCONTINUED | OUTPATIENT
Start: 2022-04-11 | End: 2022-04-11

## 2022-04-11 RX ORDER — IPRATROPIUM BROMIDE AND ALBUTEROL SULFATE 2.5; .5 MG/3ML; MG/3ML
3 SOLUTION RESPIRATORY (INHALATION) EVERY 6 HOURS PRN
Status: DISCONTINUED | OUTPATIENT
Start: 2022-04-11 | End: 2022-04-12 | Stop reason: HOSPADM

## 2022-04-11 RX ORDER — OXYCODONE HYDROCHLORIDE 5 MG/1
5 TABLET ORAL
Status: DISCONTINUED | OUTPATIENT
Start: 2022-04-11 | End: 2022-04-11 | Stop reason: HOSPADM

## 2022-04-11 RX ORDER — ONDANSETRON HYDROCHLORIDE 2 MG/ML
INJECTION, SOLUTION INTRAMUSCULAR; INTRAVENOUS
Status: DISCONTINUED | OUTPATIENT
Start: 2022-04-11 | End: 2022-04-11

## 2022-04-11 RX ORDER — ENOXAPARIN SODIUM 100 MG/ML
40 INJECTION SUBCUTANEOUS EVERY 24 HOURS
Status: DISCONTINUED | OUTPATIENT
Start: 2022-04-11 | End: 2022-04-12 | Stop reason: HOSPADM

## 2022-04-11 RX ORDER — ENOXAPARIN SODIUM 100 MG/ML
40 INJECTION SUBCUTANEOUS EVERY 24 HOURS
Status: CANCELLED | OUTPATIENT
Start: 2022-04-11

## 2022-04-11 RX ORDER — ONDANSETRON 2 MG/ML
4 INJECTION INTRAMUSCULAR; INTRAVENOUS EVERY 8 HOURS PRN
Status: DISCONTINUED | OUTPATIENT
Start: 2022-04-11 | End: 2022-04-12 | Stop reason: HOSPADM

## 2022-04-11 RX ORDER — PHENYLEPHRINE HYDROCHLORIDE 10 MG/ML
INJECTION INTRAVENOUS
Status: DISCONTINUED | OUTPATIENT
Start: 2022-04-11 | End: 2022-04-11

## 2022-04-11 RX ORDER — HYDROCODONE BITARTRATE AND ACETAMINOPHEN 5; 325 MG/1; MG/1
1 TABLET ORAL EVERY 4 HOURS PRN
Status: DISCONTINUED | OUTPATIENT
Start: 2022-04-11 | End: 2022-04-12 | Stop reason: HOSPADM

## 2022-04-11 RX ORDER — IBUPROFEN 200 MG
24 TABLET ORAL
Status: DISCONTINUED | OUTPATIENT
Start: 2022-04-11 | End: 2022-04-12 | Stop reason: HOSPADM

## 2022-04-11 RX ORDER — IBUPROFEN 200 MG
1 TABLET ORAL DAILY
Status: DISCONTINUED | OUTPATIENT
Start: 2022-04-11 | End: 2022-04-12 | Stop reason: HOSPADM

## 2022-04-11 RX ORDER — TALC
6 POWDER (GRAM) TOPICAL NIGHTLY PRN
Status: DISCONTINUED | OUTPATIENT
Start: 2022-04-11 | End: 2022-04-12 | Stop reason: HOSPADM

## 2022-04-11 RX ORDER — HYDROMORPHONE HYDROCHLORIDE 1 MG/ML
1 INJECTION, SOLUTION INTRAMUSCULAR; INTRAVENOUS; SUBCUTANEOUS EVERY 4 HOURS PRN
Status: DISCONTINUED | OUTPATIENT
Start: 2022-04-11 | End: 2022-04-12 | Stop reason: HOSPADM

## 2022-04-11 RX ORDER — SODIUM CHLORIDE 0.9 % (FLUSH) 0.9 %
10 SYRINGE (ML) INJECTION EVERY 8 HOURS PRN
Status: DISCONTINUED | OUTPATIENT
Start: 2022-04-11 | End: 2022-04-12 | Stop reason: HOSPADM

## 2022-04-11 RX ORDER — FUROSEMIDE 40 MG/1
40 TABLET ORAL 2 TIMES DAILY
COMMUNITY
End: 2022-11-08 | Stop reason: CLARIF

## 2022-04-11 RX ORDER — PROCHLORPERAZINE EDISYLATE 5 MG/ML
5 INJECTION INTRAMUSCULAR; INTRAVENOUS EVERY 6 HOURS PRN
Status: DISCONTINUED | OUTPATIENT
Start: 2022-04-11 | End: 2022-04-12 | Stop reason: HOSPADM

## 2022-04-11 RX ORDER — FENTANYL CITRATE 50 UG/ML
INJECTION, SOLUTION INTRAMUSCULAR; INTRAVENOUS
Status: DISCONTINUED | OUTPATIENT
Start: 2022-04-11 | End: 2022-04-11

## 2022-04-11 RX ORDER — FENTANYL CITRATE 50 UG/ML
25 INJECTION, SOLUTION INTRAMUSCULAR; INTRAVENOUS EVERY 5 MIN PRN
Status: DISCONTINUED | OUTPATIENT
Start: 2022-04-11 | End: 2022-04-11 | Stop reason: HOSPADM

## 2022-04-11 RX ORDER — MIDAZOLAM HYDROCHLORIDE 1 MG/ML
INJECTION INTRAMUSCULAR; INTRAVENOUS
Status: DISCONTINUED | OUTPATIENT
Start: 2022-04-11 | End: 2022-04-11

## 2022-04-11 RX ORDER — SUCCINYLCHOLINE CHLORIDE 20 MG/ML
INJECTION INTRAMUSCULAR; INTRAVENOUS
Status: DISCONTINUED | OUTPATIENT
Start: 2022-04-11 | End: 2022-04-11

## 2022-04-11 RX ORDER — IBUPROFEN 600 MG/1
600 TABLET ORAL 4 TIMES DAILY
Status: DISCONTINUED | OUTPATIENT
Start: 2022-04-11 | End: 2022-04-12 | Stop reason: HOSPADM

## 2022-04-11 RX ORDER — MAG HYDROX/ALUMINUM HYD/SIMETH 200-200-20
30 SUSPENSION, ORAL (FINAL DOSE FORM) ORAL 4 TIMES DAILY PRN
Status: DISCONTINUED | OUTPATIENT
Start: 2022-04-11 | End: 2022-04-12 | Stop reason: HOSPADM

## 2022-04-11 RX ORDER — HYDROMORPHONE HYDROCHLORIDE 2 MG/ML
0.2 INJECTION, SOLUTION INTRAMUSCULAR; INTRAVENOUS; SUBCUTANEOUS EVERY 5 MIN PRN
Status: DISCONTINUED | OUTPATIENT
Start: 2022-04-11 | End: 2022-04-11 | Stop reason: HOSPADM

## 2022-04-11 RX ORDER — ROCURONIUM BROMIDE 10 MG/ML
INJECTION, SOLUTION INTRAVENOUS
Status: DISCONTINUED | OUTPATIENT
Start: 2022-04-11 | End: 2022-04-11

## 2022-04-11 RX ORDER — DEXAMETHASONE SODIUM PHOSPHATE 4 MG/ML
INJECTION, SOLUTION INTRA-ARTICULAR; INTRALESIONAL; INTRAMUSCULAR; INTRAVENOUS; SOFT TISSUE
Status: DISCONTINUED | OUTPATIENT
Start: 2022-04-11 | End: 2022-04-11

## 2022-04-11 RX ORDER — ACETAMINOPHEN 325 MG/1
650 TABLET ORAL EVERY 4 HOURS PRN
Status: DISCONTINUED | OUTPATIENT
Start: 2022-04-11 | End: 2022-04-12 | Stop reason: HOSPADM

## 2022-04-11 RX ORDER — MORPHINE SULFATE 4 MG/ML
4 INJECTION, SOLUTION INTRAMUSCULAR; INTRAVENOUS EVERY 4 HOURS PRN
Status: DISCONTINUED | OUTPATIENT
Start: 2022-04-11 | End: 2022-04-11

## 2022-04-11 RX ORDER — ACETAMINOPHEN 10 MG/ML
1000 INJECTION, SOLUTION INTRAVENOUS EVERY 6 HOURS
Status: DISCONTINUED | OUTPATIENT
Start: 2022-04-11 | End: 2022-04-12 | Stop reason: HOSPADM

## 2022-04-11 RX ADMIN — ENOXAPARIN SODIUM 40 MG: 100 INJECTION SUBCUTANEOUS at 06:04

## 2022-04-11 RX ADMIN — IPRATROPIUM BROMIDE AND ALBUTEROL SULFATE 3 ML: .5; 2.5 SOLUTION RESPIRATORY (INHALATION) at 08:04

## 2022-04-11 RX ADMIN — DEXAMETHASONE SODIUM PHOSPHATE 4 MG: 4 INJECTION, SOLUTION INTRA-ARTICULAR; INTRALESIONAL; INTRAMUSCULAR; INTRAVENOUS; SOFT TISSUE at 03:04

## 2022-04-11 RX ADMIN — MUPIROCIN 1 G: 20 OINTMENT TOPICAL at 11:04

## 2022-04-11 RX ADMIN — IBUPROFEN 600 MG: 600 TABLET ORAL at 11:04

## 2022-04-11 RX ADMIN — IOHEXOL 100 ML: 350 INJECTION, SOLUTION INTRAVENOUS at 10:04

## 2022-04-11 RX ADMIN — EPHEDRINE SULFATE 25 MG: 50 INJECTION, SOLUTION INTRAMUSCULAR; INTRAVENOUS; SUBCUTANEOUS at 03:04

## 2022-04-11 RX ADMIN — ONDANSETRON 4 MG: 2 INJECTION, SOLUTION INTRAMUSCULAR; INTRAVENOUS at 03:04

## 2022-04-11 RX ADMIN — ROCURONIUM BROMIDE 25 MG: 10 INJECTION, SOLUTION INTRAVENOUS at 03:04

## 2022-04-11 RX ADMIN — PHENYLEPHRINE HYDROCHLORIDE 200 MCG: 10 INJECTION INTRAVENOUS at 03:04

## 2022-04-11 RX ADMIN — FENTANYL CITRATE 100 MCG: 50 INJECTION, SOLUTION INTRAMUSCULAR; INTRAVENOUS at 03:04

## 2022-04-11 RX ADMIN — HYDROMORPHONE HYDROCHLORIDE 0.2 MG: 2 INJECTION, SOLUTION INTRAMUSCULAR; INTRAVENOUS; SUBCUTANEOUS at 05:04

## 2022-04-11 RX ADMIN — FENTANYL CITRATE 50 MCG: 50 INJECTION, SOLUTION INTRAMUSCULAR; INTRAVENOUS at 04:04

## 2022-04-11 RX ADMIN — PIPERACILLIN AND TAZOBACTAM 3.38 G: 3; .375 INJECTION, POWDER, LYOPHILIZED, FOR SOLUTION INTRAVENOUS; PARENTERAL at 11:04

## 2022-04-11 RX ADMIN — SODIUM CHLORIDE, SODIUM GLUCONATE, SODIUM ACETATE, POTASSIUM CHLORIDE, MAGNESIUM CHLORIDE, SODIUM PHOSPHATE, DIBASIC, AND POTASSIUM PHOSPHATE: .53; .5; .37; .037; .03; .012; .00082 INJECTION, SOLUTION INTRAVENOUS at 02:04

## 2022-04-11 RX ADMIN — MIDAZOLAM HYDROCHLORIDE 2 MG: 1 INJECTION, SOLUTION INTRAMUSCULAR; INTRAVENOUS at 03:04

## 2022-04-11 RX ADMIN — HYDROMORPHONE HYDROCHLORIDE 1 MG: 1 INJECTION, SOLUTION INTRAMUSCULAR; INTRAVENOUS; SUBCUTANEOUS at 07:04

## 2022-04-11 RX ADMIN — ACETAMINOPHEN 1000 MG: 10 INJECTION INTRAVENOUS at 05:04

## 2022-04-11 RX ADMIN — SUCCINYLCHOLINE CHLORIDE 140 MG: 20 INJECTION, SOLUTION INTRAMUSCULAR; INTRAVENOUS; PARENTERAL at 03:04

## 2022-04-11 RX ADMIN — OXYCODONE 5 MG: 5 TABLET ORAL at 04:04

## 2022-04-11 RX ADMIN — NICOTINE 1 PATCH: 14 PATCH TRANSDERMAL at 01:04

## 2022-04-11 RX ADMIN — ROCURONIUM BROMIDE 5 MG: 10 INJECTION, SOLUTION INTRAVENOUS at 03:04

## 2022-04-11 RX ADMIN — PIPERACILLIN AND TAZOBACTAM 3.38 G: 3; .375 INJECTION, POWDER, LYOPHILIZED, FOR SOLUTION INTRAVENOUS; PARENTERAL at 06:04

## 2022-04-11 RX ADMIN — PROPOFOL 130 MG: 10 INJECTION, EMULSION INTRAVENOUS at 03:04

## 2022-04-11 RX ADMIN — IBUPROFEN 600 MG: 600 TABLET ORAL at 06:04

## 2022-04-11 RX ADMIN — HYDROMORPHONE HYDROCHLORIDE 0.2 MG: 2 INJECTION, SOLUTION INTRAMUSCULAR; INTRAVENOUS; SUBCUTANEOUS at 04:04

## 2022-04-11 RX ADMIN — LIDOCAINE HYDROCHLORIDE 100 MG: 20 INJECTION INTRAVENOUS at 03:04

## 2022-04-11 RX ADMIN — PIPERACILLIN AND TAZOBACTAM 4.5 G: 4; .5 INJECTION, POWDER, LYOPHILIZED, FOR SOLUTION INTRAVENOUS; PARENTERAL at 11:04

## 2022-04-11 NOTE — ANESTHESIA PREPROCEDURE EVALUATION
04/11/2022  Renard Jimenez is a 56 y.o., male.      Pre-op Assessment    I have reviewed the Patient Summary Reports.     I have reviewed the Nursing Notes.       Review of Systems  Anesthesia Hx:  No problems with previous Anesthesia    Cardiovascular:   Hypertension Past MI  CHF    Pulmonary:   Pneumonia COPD Asthma        Physical Exam    Airway:  Mallampati: III / II  Mouth Opening: Normal    Dental:  Dentures        Anesthesia Plan  Type of Anesthesia, risks & benefits discussed:    Anesthesia Type: Gen ETT  Intra-op Monitoring Plan: Standard ASA Monitors  Post Op Pain Control Plan: multimodal analgesia and IV/PO Opioids PRN  Induction:  IV  Airway Plan: Direct  Informed Consent: Informed consent signed with the Patient and all parties understand the risks and agree with anesthesia plan.  All questions answered.   ASA Score: 3  Day of Surgery Review of History & Physical: H&P Update referred to the surgeon/provider.    Ready For Surgery From Anesthesia Perspective.     .

## 2022-04-11 NOTE — PHARMACY MED REC
"Admission Medication History     The home medication history was taken by Smita Perez CPhT.    Medication history obtained from Patient     You may go to "Admission" then "Reconcile Home Medications" tabs to review and/or act upon these items.      The home medication list has been updated by the Pharmacy department.    Please read ALL comments highlighted in yellow.    Please address this information as you see fit.     Feel free to contact us if you have any questions or require assistance.      The medications listed below were marked currently not taking from the home medication list.  Please reorder if appropriate:  Patient reports noncompliant with filling the following medication(s):   Albuterol Inhaler   Duo-Neb   Aspirin 81mg   Baclofen 10mg   Diclofenac 75mg   Flonase   Atrovent Nebulizer Solution   Montelukast 10mg   Pantoprazole 40mg   Prednisone 20mg   Sertraline 50mg      Potential issues to be addressed PRIOR TO DISCHARGE:   Drug cost interfering with therapy (provide alternatives if possible)   Patient request for refills   Prescription could not be filled prior to admission   Patient lacks understanding of therapy      Smita Perez CPhT.  [phone]      .          "

## 2022-04-11 NOTE — ASSESSMENT & PLAN NOTE
Chronic, controlled.  Latest blood pressure and vitals reviewed-   Temp:  [97.1 °F (36.2 °C)-99.3 °F (37.4 °C)]   Pulse:  [76-88]   Resp:  [18-20]   BP: (120-179)/(70-83)   SpO2:  [93 %-96 %] .   Home meds for hypertension were reviewed and noted below.   Hypertension Medications             furosemide (LASIX) 40 MG tablet Take 40 mg by mouth 2 (two) times daily.          While in the hospital, will manage blood pressure as follows; hold home medications for now, verify home medication list  Will utilize p.r.n. blood pressure medication only if patient's blood pressure greater than  180/110 and he develops symptoms such as worsening chest pain or shortness of breath.

## 2022-04-11 NOTE — PLAN OF CARE
PT IN PRE-OP. ASSESSMENT COMPLETE. PT C/O PAIN 6/10. NAND. VSS. PT TAKES BP MEDS BUT IS UNSURE OF MEDICATION NAME OR DOSAGE AT THIS TIME

## 2022-04-11 NOTE — SUBJECTIVE & OBJECTIVE
Past Medical History:   Diagnosis Date    Asthma     CHF (congestive heart failure)     COPD (chronic obstructive pulmonary disease)     Hypertension     MI, old        Past Surgical History:   Procedure Laterality Date    TONSILLECTOMY         Review of patient's allergies indicates:   Allergen Reactions    Maple flavor Other (See Comments)     Throat swelling         No current facility-administered medications on file prior to encounter.     Current Outpatient Medications on File Prior to Encounter   Medication Sig    aspirin (ECOTRIN) 81 MG EC tablet Take 81 mg by mouth once daily.    [DISCONTINUED] albuterol (PROVENTIL HFA) 90 mcg/actuation inhaler Inhale 2 puffs into the lungs every 6 (six) hours as needed for Wheezing. Rescue    [DISCONTINUED] albuterol-ipratropium 2.5mg-0.5mg/3mL (DUO-NEB) 0.5 mg-3 mg(2.5 mg base)/3 mL nebulizer solution Take 3 mLs by nebulization every 4 (four) hours.    [DISCONTINUED] baclofen (LIORESAL) 10 MG tablet Take 1 tablet (10 mg total) by mouth 3 (three) times daily.    [DISCONTINUED] diclofenac (VOLTAREN) 75 MG EC tablet Take 75 mg by mouth 2 (two) times daily.    [DISCONTINUED] fluticasone (FLONASE) 50 mcg/actuation nasal spray 2 sprays by Each Nare route once daily.    [DISCONTINUED] fluticasone-salmeterol diskus inhaler 100-50 mcg Inhale 1 puff into the lungs 2 (two) times daily. Controller    [DISCONTINUED] ipratropium (ATROVENT) 0.02 % nebulizer solution Take 2.5 mLs (500 mcg total) by nebulization 3 (three) times daily.    [DISCONTINUED] montelukast (SINGULAIR) 10 mg tablet Take 1 tablet (10 mg total) by mouth every evening.    [DISCONTINUED] pantoprazole (PROTONIX) 40 MG tablet Take 40 mg by mouth once daily.    [DISCONTINUED] predniSONE (DELTASONE) 20 MG tablet Take 2 tablets (40 mg total) by mouth once daily.    [DISCONTINUED] sertraline (ZOLOFT) 50 MG tablet Take 50 mg by mouth every evening.     Family History    None       Tobacco Use    Smoking status: Former Smoker      Packs/day: 0.00     Years: 30.00     Pack years: 0.00    Smokeless tobacco: Not on file   Substance and Sexual Activity    Alcohol use: No     Comment: occ    Drug use: No    Sexual activity: Yes     Partners: Female     Review of Systems   Constitutional:  Negative for chills and fever.   HENT:  Negative for congestion and rhinorrhea.    Respiratory:  Positive for shortness of breath (resolved). Negative for cough and wheezing.    Cardiovascular:  Negative for chest pain, palpitations and leg swelling.   Gastrointestinal:  Positive for abdominal pain. Negative for abdominal distention, nausea and vomiting.   Endocrine: Negative for cold intolerance and heat intolerance.   Genitourinary:  Negative for dysuria and urgency.   Musculoskeletal:  Negative for arthralgias and neck stiffness.   Skin:  Negative for rash and wound.   Neurological:  Negative for dizziness and weakness.   Objective:     Vital Signs (Most Recent):  Temp: 99.3 °F (37.4 °C) (04/11/22 1344)  Pulse: 82 (04/11/22 1342)  Resp: 18 (04/11/22 1344)  BP: (!) 179/83 (04/11/22 1342)  SpO2: 96 % (04/11/22 1344)   Vital Signs (24h Range):  Temp:  [97.1 °F (36.2 °C)-99.3 °F (37.4 °C)] 99.3 °F (37.4 °C)  Pulse:  [76-88] 82  Resp:  [18-20] 18  SpO2:  [93 %-96 %] 96 %  BP: (120-179)/(70-83) 179/83     Weight: 113.4 kg (250 lb)  Body mass index is 36.92 kg/m².    Physical Exam  Constitutional:       General: He is not in acute distress.     Appearance: He is well-developed.   HENT:      Head: Normocephalic and atraumatic.   Eyes:      Pupils: Pupils are equal, round, and reactive to light.   Cardiovascular:      Rate and Rhythm: Normal rate and regular rhythm.      Heart sounds: No murmur heard.  Pulmonary:      Effort: Pulmonary effort is normal. No respiratory distress.      Breath sounds: Normal breath sounds. No wheezing or rales.   Abdominal:      General: Bowel sounds are normal. There is no distension.      Palpations: Abdomen is soft.      Tenderness:  There is abdominal tenderness (RUQ).   Musculoskeletal:         General: Normal range of motion.   Skin:     General: Skin is warm and dry.      Findings: No rash.   Neurological:      Mental Status: He is alert and oriented to person, place, and time.      Cranial Nerves: No cranial nerve deficit.   Psychiatric:         Behavior: Behavior normal.         CRANIAL NERVES     CN III, IV, VI   Pupils are equal, round, and reactive to light.     Significant Labs: All pertinent labs within the past 24 hours have been reviewed.    Significant Imaging: I have reviewed all pertinent imaging results/findings within the past 24 hours.

## 2022-04-11 NOTE — ED PROVIDER NOTES
Encounter Date: 4/11/2022    SCRIBE #1 NOTE: I, Marianne Reyes, am scribing for, and in the presence of, Zack Da Silva MD.       History     Chief Complaint   Patient presents with    Shortness of Breath     S/S since this AM--denies known illness or fever, Hx of smoking     Time seen by provider: 8:26 AM on 04/11/2022    Renard Jimenez is a 56 y.o. male with a PMHx of COPD, HTN, CHF, asthma and prior MI who presents to the ED with an onset of SOB since 3 am. Patient also reports upper abdominal pain today. Patient reports he has been out of all of his medications, including fluid pills, breathing treatments and steroids, since last month. Patient reports he does not have a primary doctor at this time. He states he lost his medicaid and disability when he went to [facility] and is trying to get them back. The patient denies fever, cough, leg swelling or any other symptoms at this time. No pertinent PSHx.      The history is provided by the patient.     Review of patient's allergies indicates:   Allergen Reactions    Maple flavor Other (See Comments)     Throat swelling       Past Medical History:   Diagnosis Date    Asthma     CHF (congestive heart failure)     COPD (chronic obstructive pulmonary disease)     Hypertension     MI, old      Past Surgical History:   Procedure Laterality Date    TONSILLECTOMY       No family history on file.  Social History     Tobacco Use    Smoking status: Former Smoker     Packs/day: 0.00     Years: 30.00     Pack years: 0.00   Substance Use Topics    Alcohol use: No     Comment: occ    Drug use: No     Review of Systems   Constitutional: Negative for fever.   HENT: Negative for sore throat.    Respiratory: Positive for shortness of breath. Negative for cough.    Cardiovascular: Negative for chest pain and leg swelling.   Gastrointestinal: Positive for abdominal pain. Negative for nausea.   Genitourinary: Negative for dysuria.   Musculoskeletal: Negative for back pain.    Skin: Negative for rash.   Neurological: Negative for weakness.   Hematological: Does not bruise/bleed easily.       Physical Exam     Initial Vitals [04/11/22 0804]   BP Pulse Resp Temp SpO2   (!) 140/83 85 20 98.5 °F (36.9 °C) 95 %      MAP       --         Physical Exam    Nursing note and vitals reviewed.  Constitutional: He appears well-developed and well-nourished. No distress.   HENT:   Head: Normocephalic and atraumatic.   Eyes: Conjunctivae and EOM are normal. Pupils are equal, round, and reactive to light.   Neck: Neck supple.   Cardiovascular: Normal rate, regular rhythm and normal heart sounds. Exam reveals no gallop and no friction rub.    No murmur heard.  Pulmonary/Chest: No respiratory distress. He has wheezes. He has no rhonchi. He has no rales.   Expiratory wheezing noted.   Abdominal: Abdomen is soft. Bowel sounds are normal. He exhibits no distension. There is no abdominal tenderness.   Musculoskeletal:         General: No tenderness or edema. Normal range of motion.      Cervical back: Neck supple.      Comments: No peripheral edema.     Neurological: He is alert and oriented to person, place, and time.   Skin: Skin is warm and dry.   Psychiatric: He has a normal mood and affect.         ED Course   Procedures  Labs Reviewed   CBC W/ AUTO DIFFERENTIAL - Abnormal; Notable for the following components:       Result Value    WBC 21.52 (*)     Gran # (ANC) 18.8 (*)     Immature Grans (Abs) 0.11 (*)     Lymph # 0.9 (*)     Mono # 1.6 (*)     Gran % 87.5 (*)     Lymph % 4.1 (*)     All other components within normal limits   COMPREHENSIVE METABOLIC PANEL - Abnormal; Notable for the following components:    Sodium 128 (*)     Chloride 92 (*)     Total Bilirubin 2.4 (*)     All other components within normal limits   B-TYPE NATRIURETIC PEPTIDE - Abnormal; Notable for the following components:     (*)     All other components within normal limits   URINALYSIS, REFLEX TO URINE CULTURE - Abnormal;  Notable for the following components:    Specific Gravity, UA <=1.005 (*)     Occult Blood UA Trace (*)     Leukocytes, UA Trace (*)     All other components within normal limits    Narrative:     Specimen Source->Urine   CULTURE, BLOOD   CULTURE, BLOOD   TROPONIN I   LACTIC ACID, PLASMA   LIPASE   SARS-COV-2 RNA AMPLIFICATION, QUAL   URINALYSIS MICROSCOPIC    Narrative:     Specimen Source->Urine          Imaging Results          MRI MRCP Without Contrast (Final result)  Result time 04/11/22 13:41:56    Final result by Vincent Willett MD (04/11/22 13:41:56)                 Impression:      Features of acute cholecystitis.  2.6 cm stone at the gallbladder neck, likely impacted.  No biliary dilatation.      Electronically signed by: Vincent Willett MD  Date:    04/11/2022  Time:    13:41             Narrative:    EXAMINATION:  MRI ABDOMEN WITHOUT CONTRAST MRCP    CLINICAL HISTORY:  general surgeon wants an MRCP prior to cholecystectomy;    TECHNIQUE:  Multiplanar, multisequence images are performed through the liver and upper abdomen.  Additionally 2D and 3D MRCP sequences are performed as well as MIP images.    COMPARISON:  None.    FINDINGS:  The gallbladder is moderately distended and exhibits mural edema and abundant pericholecystic stranding.  There is a 2.5 cm stone of the gallbladder neck.  A 1.2 cm stone is present at the gallbladder fundus.  The common bile duct is a normal caliber at 3 mm.  There is no intrahepatic biliary dilatation.    The liver, spleen, pancreas, adrenal glands, and kidneys are unremarkable.                               CT Abdomen Pelvis With Contrast (Final result)  Result time 04/11/22 11:14:05    Final result by Vincent Willett MD (04/11/22 11:14:05)                 Impression:      Features of acute cholecystitis.      Electronically signed by: Vincent Willett MD  Date:    04/11/2022  Time:    11:14             Narrative:    EXAMINATION:  CT ABDOMEN PELVIS WITH  CONTRAST    CLINICAL HISTORY:  ruq abdominal pain;    TECHNIQUE:  Low dose axial images, sagittal and coronal reformations were obtained from the lung bases to the pubic symphysis following the IV administration of 100 mL of Omnipaque 350 .  Oral contrast was not given.    COMPARISON:  None.    FINDINGS:  The gallbladder is distended, and exhibits moderate pericholecystic fluid and fat stranding.  A few tiny stones are present the gallbladder fundus.  There is no biliary dilatation.    Are present a few old calcified hepatic granulomas are present.  The spleen is normal in size.  The pancreas and adrenal glands are unremarkable.  The kidneys are unremarkable.  Aerated    The bowel is nondilated.  The appendix is normal.  Her the colon is unremarkable.  There is moderate calcification of the aorta without aneurysm.  There is no acute bony abnormality.  There is degenerative change of both hips.                               X-Ray Chest AP Portable (Final result)  Result time 04/11/22 08:43:20    Final result by Toñito Pederson Jr., MD (04/11/22 08:43:20)                 Impression:      Cardiomegaly and prominence of the pulmonary vasculature may represent borderline heart function.  Atherosclerosis noted      Electronically signed by: Toñito Pederson MD  Date:    04/11/2022  Time:    08:43             Narrative:    EXAMINATION:  XR CHEST AP PORTABLE    CLINICAL HISTORY:  Shortness of breath    TECHNIQUE:  Single frontal view of the chest was performed.    COMPARISON:  Chest of October 8, 2019, chest x-ray of September 30, 2016    FINDINGS:  Calcification is noted in the aorta.  There is mild cardiomegaly.  There is prominence of the pulmonary vasculature.  Definite pulmonary edema is not seen..  No pneumothorax or pleural effusion is noted.                                 Medications   nicotine 14 mg/24 hr 1 patch (1 patch Transdermal Patch Applied 4/11/22 1312)   albuterol-ipratropium 2.5 mg-0.5 mg/3 mL  nebulizer solution 3 mL (3 mLs Nebulization Given 4/11/22 5086)   iohexoL (OMNIPAQUE 350) injection 100 mL (100 mLs Intravenous Given 4/11/22 1048)   piperacillin-tazobactam 4.5 g in dextrose 5 % 100 mL IVPB (ready to mix system) (0 g Intravenous Stopped 4/11/22 1221)     Medical Decision Making:   History:   Old Medical Records: I decided to obtain old medical records.  Independently Interpreted Test(s):   I have ordered and independently interpreted X-rays - see prior notes.  Clinical Tests:   Lab Tests: Ordered and Reviewed  Radiological Study: Ordered and Reviewed  Patient appears have acute cholecystitis.  Spoke to the general surgeon who will take the patient cholecystectomy.  Regards to his shortness of breath he had some wheezing and does not have his regular medicines for treatment of his chronic COPD.  BNP was slightly elevated and he has cardiomegaly but he has no signs of overt heart failure on exam.  Zosyn was given after cultures.  He has leukocytosis but is not febrile and does not appear to be septic.  MRCP requested by general surgery did not show any signs of choledocholithiasis or dilated CBD          Scribe Attestation:   Scribe #1: I performed the above scribed service and the documentation accurately describes the services I performed. I attest to the accuracy of the note.        ED Course as of 04/11/22 1359   Mon Apr 11, 2022   0954 EKG independently interpreted by me as rate 71 normal sinus rate rhythm axis and intervals with no ST segment elevation or depression.  Patient has a sodium of 128 and a BNP of 126. White count of 21. No signs of pneumonia.  States he has some mild dysuria.  I will get a urinalysis.  Will discuss with Hospital Medicine case management   [JS]   1001 On repeat exam the patient  has tenderness in the right upper quadrant.  States he has been having some abdominal pain given localized tenderness with leukocytosis and elevated T bili I will further evaluate for  potential cholecystitis.  Will get a CT scan at this time. [JS]   1057 My independent interpretation of the CT scan shows pericholecystic fluid or thickened gallbladder wall. [JS]      ED Course User Index  [JS] Zack Da Silva MD           I, Dr. Zack Da Silva personally performed the services described in this documentation. All medical record entries made by the scribe were at my direction and in my presence.  I have reviewed the chart and agree that the record reflects my personal performance and is accurate and complete. Zack Da Silva MD.  1:59 PM 04/11/2022    DISCLAIMER: This note was prepared with Dragon NaturallySpeaking voice recognition transcription software. Garbled syntax, mangled pronouns, and other bizarre constructions may be attributed to that software system   Clinical Impression:   Final diagnoses:  [R06.02] SOB (shortness of breath)  [K81.0] Acute cholecystitis (Primary)  [J42] Chronic bronchitis, unspecified chronic bronchitis type          ED Disposition Condition    Admit               Zack Da Silva MD  04/11/22 9067

## 2022-04-11 NOTE — H&P
[facility] Medicine  History & Physical    Patient Name: Renard Jimenez  MRN: [id number]  Patient Class: IP- Inpatient  Admission Date: 4/11/2022  Attending Physician: Julisa Herbert MD  Primary Care Provider: Lisa Olivera MD         Patient information was obtained from patient, past medical records and ER records.     Subjective:     Principal Problem:Acute cholecystitis    Chief Complaint:   Chief Complaint   Patient presents with    Shortness of Breath     S/S since this AM--denies known illness or fever, Hx of smoking        HPI:   Mr. Jimenez is a 56 y.o. male with a PMHx of COPD, HTN, CHF, asthma and prior MI who presents to the ED with an onset of SOB since this morning. Patient also reports upper abdominal pain which started today. Patient reports recently getting out of correction and having no refills on his home medications or access to PCP in the past month. He denies fever, chills, chest pain, cough, wheezing, or lower extremity edema at this time. States his appetite is normal and is having regular BMs and has no problems with urination. ED workup remarkable for acute cholecystitis and hyponatremia. Patient denies shortness of breath at the time of my exam.       Past Medical History:   Diagnosis Date    Asthma     CHF (congestive heart failure)     COPD (chronic obstructive pulmonary disease)     Hypertension     MI, old        Past Surgical History:   Procedure Laterality Date    TONSILLECTOMY         Review of patient's allergies indicates:   Allergen Reactions    Maple flavor Other (See Comments)     Throat swelling         No current facility-administered medications on file prior to encounter.     Current Outpatient Medications on File Prior to Encounter   Medication Sig    aspirin (ECOTRIN) 81 MG EC tablet Take 81 mg by mouth once daily.    [DISCONTINUED] albuterol (PROVENTIL HFA) 90 mcg/actuation inhaler Inhale 2 puffs into the lungs every 6 (six) hours as  needed for Wheezing. Rescue    [DISCONTINUED] albuterol-ipratropium 2.5mg-0.5mg/3mL (DUO-NEB) 0.5 mg-3 mg(2.5 mg base)/3 mL nebulizer solution Take 3 mLs by nebulization every 4 (four) hours.    [DISCONTINUED] baclofen (LIORESAL) 10 MG tablet Take 1 tablet (10 mg total) by mouth 3 (three) times daily.    [DISCONTINUED] diclofenac (VOLTAREN) 75 MG EC tablet Take 75 mg by mouth 2 (two) times daily.    [DISCONTINUED] fluticasone (FLONASE) 50 mcg/actuation nasal spray 2 sprays by Each Nare route once daily.    [DISCONTINUED] fluticasone-salmeterol diskus inhaler 100-50 mcg Inhale 1 puff into the lungs 2 (two) times daily. Controller    [DISCONTINUED] ipratropium (ATROVENT) 0.02 % nebulizer solution Take 2.5 mLs (500 mcg total) by nebulization 3 (three) times daily.    [DISCONTINUED] montelukast (SINGULAIR) 10 mg tablet Take 1 tablet (10 mg total) by mouth every evening.    [DISCONTINUED] pantoprazole (PROTONIX) 40 MG tablet Take 40 mg by mouth once daily.    [DISCONTINUED] predniSONE (DELTASONE) 20 MG tablet Take 2 tablets (40 mg total) by mouth once daily.    [DISCONTINUED] sertraline (ZOLOFT) 50 MG tablet Take 50 mg by mouth every evening.     Family History    None       Tobacco Use    Smoking status: Former Smoker     Packs/day: 0.00     Years: 30.00     Pack years: 0.00    Smokeless tobacco: Not on file   Substance and Sexual Activity    Alcohol use: No     Comment: occ    Drug use: No    Sexual activity: Yes     Partners: Female     Review of Systems   Constitutional:  Negative for chills and fever.   HENT:  Negative for congestion and rhinorrhea.    Respiratory:  Positive for shortness of breath (resolved). Negative for cough and wheezing.    Cardiovascular:  Negative for chest pain, palpitations and leg swelling.   Gastrointestinal:  Positive for abdominal pain. Negative for abdominal distention, nausea and vomiting.   Endocrine: Negative for cold intolerance and heat intolerance.   Genitourinary:   Negative for dysuria and urgency.   Musculoskeletal:  Negative for arthralgias and neck stiffness.   Skin:  Negative for rash and wound.   Neurological:  Negative for dizziness and weakness.   Objective:     Vital Signs (Most Recent):  Temp: 99.3 °F (37.4 °C) (04/11/22 1344)  Pulse: 82 (04/11/22 1342)  Resp: 18 (04/11/22 1344)  BP: (!) 179/83 (04/11/22 1342)  SpO2: 96 % (04/11/22 1344)   Vital Signs (24h Range):  Temp:  [97.1 °F (36.2 °C)-99.3 °F (37.4 °C)] 99.3 °F (37.4 °C)  Pulse:  [76-88] 82  Resp:  [18-20] 18  SpO2:  [93 %-96 %] 96 %  BP: (120-179)/(70-83) 179/83     Weight: 113.4 kg (250 lb)  Body mass index is 36.92 kg/m².    Physical Exam  Constitutional:       General: He is not in acute distress.     Appearance: He is well-developed.   HENT:      Head: Normocephalic and atraumatic.   Eyes:      Pupils: Pupils are equal, round, and reactive to light.   Cardiovascular:      Rate and Rhythm: Normal rate and regular rhythm.      Heart sounds: No murmur heard.  Pulmonary:      Effort: Pulmonary effort is normal. No respiratory distress.      Breath sounds: Normal breath sounds. No wheezing or rales.   Abdominal:      General: Bowel sounds are normal. There is no distension.      Palpations: Abdomen is soft.      Tenderness: There is abdominal tenderness (RUQ).   Musculoskeletal:         General: Normal range of motion.   Skin:     General: Skin is warm and dry.      Findings: No rash.   Neurological:      Mental Status: He is alert and oriented to person, place, and time.      Cranial Nerves: No cranial nerve deficit.   Psychiatric:         Behavior: Behavior normal.         CRANIAL NERVES     CN III, IV, VI   Pupils are equal, round, and reactive to light.     Significant Labs: All pertinent labs within the past 24 hours have been reviewed.    Significant Imaging: I have reviewed all pertinent imaging results/findings within the past 24 hours.    Assessment/Plan:     * Acute cholecystitis  Surgery consulted  and plan to take to OR this afternoon  IV Zosyn  Pain control  PRN anti-emetics        Hyponatremia  Gentle IVF hydration      Essential hypertension  Chronic, controlled.  Latest blood pressure and vitals reviewed-   Temp:  [97.1 °F (36.2 °C)-99.3 °F (37.4 °C)]   Pulse:  [76-88]   Resp:  [18-20]   BP: (120-179)/(70-83)   SpO2:  [93 %-96 %] .   Home meds for hypertension were reviewed and noted below.   Hypertension Medications             furosemide (LASIX) 40 MG tablet Take 40 mg by mouth 2 (two) times daily.          While in the hospital, will manage blood pressure as follows; hold home medications for now, verify home medication list  Will utilize p.r.n. blood pressure medication only if patient's blood pressure greater than  180/110 and he develops symptoms such as worsening chest pain or shortness of breath.        Obesity, Class II, BMI 35-39.9  Body mass index is 36.92 kg/m². Morbid obesity complicates all aspects of disease management from diagnostic modalities to treatment. Weight loss encouraged and health benefits explained to patient.        Tobacco abuse  Assistance with smoking cessation was offered, including:  [x]  Medications  [x]  Counseling  []  Printed Information on Smoking Cessation  []  Referral to a Smoking Cessation Program    Patient was counseled regarding smoking for 3-10 minutes.            VTE Risk Mitigation (From admission, onward)    None             Julisa Herbert MD  Department of Hospital Medicine   [facility] - Emergency Dept

## 2022-04-11 NOTE — CONSULTS
[facility]  General Surgery  Consult Note    Patient Name: Renard Jimenez  MRN: [id number]  Code Status: Prior  Admission Date: 4/11/2022  Hospital Length of Stay: 0 days  Attending Physician: No att. providers found  Primary Care Provider: Lisa Olivera MD    Patient information was obtained from patient and ER records.     Consults  Subjective:     Principal Problem: Acute cholecystitis    History of Present Illness: Pleasant 56 y.o. male with a PMHx of COPD, HTN, CHF, asthma and prior MI who presents to the ED with complaint of SOB and upper abdominal pain.  He notes SOB has improved.  He states his abdominal pain increased this AM but on further questioning notes it had been present for several days.  Denies n/v.  No fever/chills.  .In ER pt had CT scan and MRCP with findings suggestive of acute cholecystitis.  Surgery consulted for evaluation.         No current facility-administered medications on file prior to encounter.     Current Outpatient Medications on File Prior to Encounter   Medication Sig    aspirin (ECOTRIN) 81 MG EC tablet Take 81 mg by mouth once daily.    furosemide (LASIX) 40 MG tablet Take 40 mg by mouth 2 (two) times daily.    [DISCONTINUED] albuterol (PROVENTIL HFA) 90 mcg/actuation inhaler Inhale 2 puffs into the lungs every 6 (six) hours as needed for Wheezing. Rescue    [DISCONTINUED] albuterol-ipratropium 2.5mg-0.5mg/3mL (DUO-NEB) 0.5 mg-3 mg(2.5 mg base)/3 mL nebulizer solution Take 3 mLs by nebulization every 4 (four) hours.    [DISCONTINUED] baclofen (LIORESAL) 10 MG tablet Take 1 tablet (10 mg total) by mouth 3 (three) times daily.    [DISCONTINUED] diclofenac (VOLTAREN) 75 MG EC tablet Take 75 mg by mouth 2 (two) times daily.    [DISCONTINUED] fluticasone (FLONASE) 50 mcg/actuation nasal spray 2 sprays by Each Nare route once daily.    [DISCONTINUED] fluticasone-salmeterol diskus inhaler 100-50 mcg Inhale 1 puff into the lungs 2 (two) times daily.  Controller    [DISCONTINUED] ipratropium (ATROVENT) 0.02 % nebulizer solution Take 2.5 mLs (500 mcg total) by nebulization 3 (three) times daily.    [DISCONTINUED] montelukast (SINGULAIR) 10 mg tablet Take 1 tablet (10 mg total) by mouth every evening.    [DISCONTINUED] pantoprazole (PROTONIX) 40 MG tablet Take 40 mg by mouth once daily.    [DISCONTINUED] predniSONE (DELTASONE) 20 MG tablet Take 2 tablets (40 mg total) by mouth once daily.    [DISCONTINUED] sertraline (ZOLOFT) 50 MG tablet Take 50 mg by mouth every evening.       Review of patient's allergies indicates:   Allergen Reactions    Maple flavor Other (See Comments)     Throat swelling         Past Medical History:   Diagnosis Date    Asthma     CHF (congestive heart failure)     COPD (chronic obstructive pulmonary disease)     Hypertension     MI, old      Past Surgical History:   Procedure Laterality Date    TONSILLECTOMY       Family History    None       Tobacco Use    Smoking status: Former Smoker     Packs/day: 0.00     Years: 30.00     Pack years: 0.00    Smokeless tobacco: Not on file   Substance and Sexual Activity    Alcohol use: No     Comment: occ    Drug use: No    Sexual activity: Yes     Partners: Female     Review of Systems   Respiratory:  Positive for cough and shortness of breath.    Cardiovascular:  Negative for chest pain.   Gastrointestinal:  Positive for abdominal pain.   Skin:  Negative for color change and pallor.   Hematological:  Negative for adenopathy. Does not bruise/bleed easily.   Psychiatric/Behavioral:  Negative for agitation and decreased concentration.    Objective:     Vital Signs (Most Recent):  Temp: 98.1 °F (36.7 °C) (04/11/22 1427)  Pulse: 88 (04/11/22 1427)  Resp: 18 (04/11/22 1427)  BP: (!) 177/77 (04/11/22 1427)  SpO2: (!) 94 % (04/11/22 1427)   Vital Signs (24h Range):  Temp:  [97.1 °F (36.2 °C)-99.3 °F (37.4 °C)] 98.1 °F (36.7 °C)  Pulse:  [76-88] 88  Resp:  [18-20] 18  SpO2:  [93 %-96 %] 94  %  BP: (120-179)/(70-83) 177/77     Weight: 113.4 kg (250 lb)  Body mass index is 36.92 kg/m².    Physical Exam  Vitals reviewed.   Constitutional:       General: He is not in acute distress.  Cardiovascular:      Rate and Rhythm: Normal rate.   Pulmonary:      Effort: Pulmonary effort is normal. No respiratory distress.      Breath sounds: No wheezing.   Abdominal:      General: Abdomen is flat. Bowel sounds are normal.      Tenderness: There is abdominal tenderness. There is no guarding.      Hernia: No hernia is present.   Neurological:      Mental Status: He is alert.   Psychiatric:         Mood and Affect: Mood normal.         Behavior: Behavior normal.       Significant Labs:  I have reviewed all pertinent lab results within the past 24 hours.  CBC:   Recent Labs   Lab 04/11/22  0854   WBC 21.52*   RBC 5.58   HGB 17.0   HCT 51.4      MCV 92   MCH 30.5   MCHC 33.1     CMP:   Recent Labs   Lab 04/11/22  0854      CALCIUM 9.1   ALBUMIN 3.5   PROT 7.2   *   K 4.9   CO2 28   CL 92*   BUN 11   CREATININE 0.8   ALKPHOS 91   ALT 17   AST 18   BILITOT 2.4*       Significant Diagnostics:  CT and MRCP reviewed with findings suggestive of acute cholecystitis.       Assessment/Plan:     * Acute cholecystitis  To OR for lap angel luis        VTE Risk Mitigation (From admission, onward)    None          Thank you for your consult. I will follow-up with patient. Please contact us if you have any additional questions.    Neville Hutchinson MD  General Surgery  [facility]

## 2022-04-11 NOTE — ANESTHESIA POSTPROCEDURE EVALUATION
Anesthesia Post Evaluation    Patient: Renard Jimenez    Procedure(s) Performed: Procedure(s) (LRB):  CHOLECYSTECTOMY, LAPAROSCOPIC (N/A)    Final Anesthesia Type: general      Patient location during evaluation: PACU  Patient participation: Yes- Able to Participate  Level of consciousness: awake and alert, oriented and awake  Post-procedure vital signs: reviewed and stable  Pain management: adequate  Airway patency: patent    PONV status at discharge: No PONV  Anesthetic complications: no      Cardiovascular status: blood pressure returned to baseline and hemodynamically stable  Respiratory status: unassisted, spontaneous ventilation and room air  Hydration status: euvolemic  Follow-up not needed.          Vitals Value Taken Time   /63 04/11/22 1641   Temp  04/11/22 1642   Pulse 102 04/11/22 1641   Resp 34 04/11/22 1641   SpO2 97 % 04/11/22 1641   Vitals shown include unvalidated device data.      No case tracking events are documented in the log.      Pain/Christiano Score: No data recorded

## 2022-04-11 NOTE — PLAN OF CARE
PTS SISTER NOTIFIED THAT PT WILL GO TO SURGERY TODAY. SHE IS SIGNED UP FOR TEXT MESSAGING. PT WILL GO TO ROOM 201. FAMILY AWARE.

## 2022-04-11 NOTE — HPI
Mr. Jimenez is a 56 y.o. male with a PMHx of COPD, HTN, CHF, asthma and prior MI who presents to the ED with an onset of SOB since this morning. Patient also reports upper abdominal pain which started today. Patient reports recently getting out of residential and having no refills on his home medications or access to PCP in the past month. He denies fever, chills, chest pain, cough, wheezing, or lower extremity edema at this time. States his appetite is normal and is having regular BMs and has no problems with urination. ED workup remarkable for acute cholecystitis and hyponatremia. Patient denies shortness of breath at the time of my exam.

## 2022-04-11 NOTE — ASSESSMENT & PLAN NOTE
Body mass index is 36.92 kg/m². Morbid obesity complicates all aspects of disease management from diagnostic modalities to treatment. Weight loss encouraged and health benefits explained to patient.

## 2022-04-11 NOTE — ANESTHESIA PROCEDURE NOTES
Intubation    Date/Time: 4/11/2022 3:30 PM  Performed by: Yan Gomez CRNA  Authorized by: Lis Pederson MD     Intubation:     Induction:  Intravenous    Intubated:  Postinduction    Mask Ventilation:  N/a    Attempts:  1    Attempted By:  CRNA    Method of Intubation:  Direct    Blade:  Obey 3    Laryngeal View Grade: Grade I - full view of cords      Difficult Airway Encountered?: No      Complications:  None    Airway Device:  Oral endotracheal tube    Airway Device Size:  8.0    Style/Cuff Inflation:  Cuffed (inflated to minimal occlusive pressure)    Inflation Amount (mL):  6    Tube secured:  22    Secured at:  The lips    Placement Verified By:  Capnometry    Complicating Factors:  None    Findings Post-Intubation:  BS equal bilateral and atraumatic/condition of teeth unchanged

## 2022-04-11 NOTE — ASSESSMENT & PLAN NOTE
Surgery consulted and plan to take to OR this afternoon  IV Zosyn  Pain control  PRN anti-emetics

## 2022-04-11 NOTE — BRIEF OP NOTE
[facility]  Brief Operative Note    SUMMARY     Surgery Date: 4/11/2022     Surgeon(s) and Role:     * Neville Hutchinson MD - Primary    Assisting Surgeon: None    Pre-op Diagnosis:  Cholecystitis [K81.9]    Post-op Diagnosis:  Post-Op Diagnosis Codes:     * Cholecystitis [K81.9]    Procedure(s) (LRB):  CHOLECYSTECTOMY, LAPAROSCOPIC (N/A)    Anesthesia: General    Operative Findings: Acute gangrenous cholecystitis (22 modifier)    Estimated Blood Loss:25 cc's    Estimated Blood Loss has been documented.         Specimens:   Specimen (24h ago, onward)            None          [id number]

## 2022-04-11 NOTE — SUBJECTIVE & OBJECTIVE
No current facility-administered medications on file prior to encounter.     Current Outpatient Medications on File Prior to Encounter   Medication Sig    aspirin (ECOTRIN) 81 MG EC tablet Take 81 mg by mouth once daily.    furosemide (LASIX) 40 MG tablet Take 40 mg by mouth 2 (two) times daily.    [DISCONTINUED] albuterol (PROVENTIL HFA) 90 mcg/actuation inhaler Inhale 2 puffs into the lungs every 6 (six) hours as needed for Wheezing. Rescue    [DISCONTINUED] albuterol-ipratropium 2.5mg-0.5mg/3mL (DUO-NEB) 0.5 mg-3 mg(2.5 mg base)/3 mL nebulizer solution Take 3 mLs by nebulization every 4 (four) hours.    [DISCONTINUED] baclofen (LIORESAL) 10 MG tablet Take 1 tablet (10 mg total) by mouth 3 (three) times daily.    [DISCONTINUED] diclofenac (VOLTAREN) 75 MG EC tablet Take 75 mg by mouth 2 (two) times daily.    [DISCONTINUED] fluticasone (FLONASE) 50 mcg/actuation nasal spray 2 sprays by Each Nare route once daily.    [DISCONTINUED] fluticasone-salmeterol diskus inhaler 100-50 mcg Inhale 1 puff into the lungs 2 (two) times daily. Controller    [DISCONTINUED] ipratropium (ATROVENT) 0.02 % nebulizer solution Take 2.5 mLs (500 mcg total) by nebulization 3 (three) times daily.    [DISCONTINUED] montelukast (SINGULAIR) 10 mg tablet Take 1 tablet (10 mg total) by mouth every evening.    [DISCONTINUED] pantoprazole (PROTONIX) 40 MG tablet Take 40 mg by mouth once daily.    [DISCONTINUED] predniSONE (DELTASONE) 20 MG tablet Take 2 tablets (40 mg total) by mouth once daily.    [DISCONTINUED] sertraline (ZOLOFT) 50 MG tablet Take 50 mg by mouth every evening.       Review of patient's allergies indicates:   Allergen Reactions    Maple flavor Other (See Comments)     Throat swelling         Past Medical History:   Diagnosis Date    Asthma     CHF (congestive heart failure)     COPD (chronic obstructive pulmonary disease)     Hypertension     MI, old      Past Surgical History:   Procedure Laterality Date    TONSILLECTOMY        Family History    None       Tobacco Use    Smoking status: Former Smoker     Packs/day: 0.00     Years: 30.00     Pack years: 0.00    Smokeless tobacco: Not on file   Substance and Sexual Activity    Alcohol use: No     Comment: occ    Drug use: No    Sexual activity: Yes     Partners: Female     Review of Systems   Respiratory:  Positive for cough and shortness of breath.    Cardiovascular:  Negative for chest pain.   Gastrointestinal:  Positive for abdominal pain.   Skin:  Negative for color change and pallor.   Hematological:  Negative for adenopathy. Does not bruise/bleed easily.   Psychiatric/Behavioral:  Negative for agitation and decreased concentration.    Objective:     Vital Signs (Most Recent):  Temp: 98.1 °F (36.7 °C) (04/11/22 1427)  Pulse: 88 (04/11/22 1427)  Resp: 18 (04/11/22 1427)  BP: (!) 177/77 (04/11/22 1427)  SpO2: (!) 94 % (04/11/22 1427)   Vital Signs (24h Range):  Temp:  [97.1 °F (36.2 °C)-99.3 °F (37.4 °C)] 98.1 °F (36.7 °C)  Pulse:  [76-88] 88  Resp:  [18-20] 18  SpO2:  [93 %-96 %] 94 %  BP: (120-179)/(70-83) 177/77     Weight: 113.4 kg (250 lb)  Body mass index is 36.92 kg/m².    Physical Exam  Vitals reviewed.   Constitutional:       General: He is not in acute distress.  Cardiovascular:      Rate and Rhythm: Normal rate.   Pulmonary:      Effort: Pulmonary effort is normal. No respiratory distress.      Breath sounds: No wheezing.   Abdominal:      General: Abdomen is flat. Bowel sounds are normal.      Tenderness: There is abdominal tenderness. There is no guarding.      Hernia: No hernia is present.   Neurological:      Mental Status: He is alert.   Psychiatric:         Mood and Affect: Mood normal.         Behavior: Behavior normal.       Significant Labs:  I have reviewed all pertinent lab results within the past 24 hours.  CBC:   Recent Labs   Lab 04/11/22  0854   WBC 21.52*   RBC 5.58   HGB 17.0   HCT 51.4      MCV 92   MCH 30.5   MCHC 33.1     CMP:   Recent Labs   Lab  04/11/22  0854      CALCIUM 9.1   ALBUMIN 3.5   PROT 7.2   *   K 4.9   CO2 28   CL 92*   BUN 11   CREATININE 0.8   ALKPHOS 91   ALT 17   AST 18   BILITOT 2.4*       Significant Diagnostics:  CT and MRCP reviewed with findings suggestive of acute cholecystitis.

## 2022-04-11 NOTE — TRANSFER OF CARE
"Anesthesia Transfer of Care Note    Patient: Renard Jimenez    Procedure(s) Performed: Procedure(s) (LRB):  CHOLECYSTECTOMY, LAPAROSCOPIC (N/A)    Patient location: PACU    Anesthesia Type: general    Transport from OR: Transported from OR on 2-3 L/min O2 by NC with adequate spontaneous ventilation    Post pain: adequate analgesia    Post assessment: no apparent anesthetic complications and tolerated procedure well    Post vital signs: stable    Level of consciousness: awake, alert and oriented    Nausea/Vomiting: no nausea/vomiting    Complications: none    Transfer of care protocol was followed      Last vitals:   Visit Vitals  BP (!) 177/77 (BP Location: Left arm, Patient Position: Sitting)   Pulse 88   Temp 36.7 °C (98.1 °F) (Temporal)   Resp 18   Ht 5' 9" (1.753 m)   Wt 113.4 kg (250 lb)   SpO2 (!) 94%   BMI 36.92 kg/m²     "

## 2022-04-11 NOTE — OP NOTE
Date of surgery:  4/11/22    Staff surgeon:  Dr. Neville Hutchinson    Preoperative diagnosis:  Acute cholecystitis    Postoperative diagnosis:  Acute gangrenous cholecystitis    Procedure:  Laparoscopic cholecystectomy (22 modifier)    Anesthesia:  General endotracheal anesthesia    Indication for procedure:  Pleasant 56 year-old male presented to theER with signs symptoms suggestive of acute cholecystitis.  He had clinical and radiographinc findings suggestive of acute cholecystitis.  He is scheduled for laparoscopic cholecystectomy the above-mentioned date.    Description of procedure:  Following signing informed consent patient in the operating room placed supine position.  General endotracheal anesthesia was administered without event.  The abdomen is prepped and draped in standard fashion and appropriate time-out procedure was then performed.  Next a  small transverse incision above the umbilicus is made and carried down to  the deep dermal tissue.  The abdominal cavity is entered with a  Veress needle and pneumoperitoneum to 15 mmHg is established.  Next the abdomen is entered with an Optiview trocar under direct visualization.  Patient placed in reverse Trendelenburg and tilted towards the left side.  A 5 mm is placed in the epigastric trocar and 2 in the right subcostal margin.  All trocars were placed under direct visualization and after injection of quarter percent Marcaine with epi. Gallbladder is visualized.  There is obvious necrosis of the fundus of the gallbladder and associated thickening and dilation.  Needle decompression is performed on the gallbladder.  The fundus was grasped and held over the patient's liver.  This exposed the infundibulum which was grasped and held towards the patient's feet.  Next the triangle of Calot is dissected identifying the cystic duct and cystic artery in their usual anatomic locations. Two clips are placed distally on the cystic duct 1 clip proximally.  Two clips are  placed on the cystic artery. The duct and artery are cut between clips.  Next cautery is used to remove the gallbladder from the hepatic fossa.  The gallbladder is then removed from the abdominal cavity with assistance of an Endo-Catch bag.  Having removed the gallbladder from the hepatic fossa it is evaluated  once again to ensure adequate hemostasis.  Surgicel powder is injected without event.  Given the extreme inflammation and thickening of the gallbladder this added significant time and difficulty to the case. It is appropriate that a 22 modifier be added. Next all trocars are removed under direct visualization.  I closed the umbilical fascia with 0 Vicryl suture.  Skin incision was closed with 4-0 Monocryl.  Patient is extubated taken recovery room stable condition.  Blood loss was 25  cc's

## 2022-04-12 VITALS
OXYGEN SATURATION: 96 % | SYSTOLIC BLOOD PRESSURE: 134 MMHG | RESPIRATION RATE: 16 BRPM | BODY MASS INDEX: 38.35 KG/M2 | HEIGHT: 70 IN | WEIGHT: 267.88 LBS | HEART RATE: 67 BPM | DIASTOLIC BLOOD PRESSURE: 67 MMHG | TEMPERATURE: 97 F

## 2022-04-12 LAB
ANION GAP SERPL CALC-SCNC: 11 MMOL/L (ref 8–16)
BUN SERPL-MCNC: 18 MG/DL (ref 6–20)
CALCIUM SERPL-MCNC: 9.1 MG/DL (ref 8.7–10.5)
CHLORIDE SERPL-SCNC: 96 MMOL/L (ref 95–110)
CO2 SERPL-SCNC: 26 MMOL/L (ref 23–29)
CREAT SERPL-MCNC: 0.9 MG/DL (ref 0.5–1.4)
ERYTHROCYTE [DISTWIDTH] IN BLOOD BY AUTOMATED COUNT: 14 % (ref 11.5–14.5)
EST. GFR  (AFRICAN AMERICAN): >60 ML/MIN/1.73 M^2
EST. GFR  (NON AFRICAN AMERICAN): >60 ML/MIN/1.73 M^2
GLUCOSE SERPL-MCNC: 101 MG/DL (ref 70–110)
HCT VFR BLD AUTO: 47.4 % (ref 40–54)
HGB BLD-MCNC: 15.5 G/DL (ref 14–18)
MAGNESIUM SERPL-MCNC: 2.2 MG/DL (ref 1.6–2.6)
MCH RBC QN AUTO: 31 PG (ref 27–31)
MCHC RBC AUTO-ENTMCNC: 32.7 G/DL (ref 32–36)
MCV RBC AUTO: 95 FL (ref 82–98)
PLATELET # BLD AUTO: 197 K/UL (ref 150–450)
PMV BLD AUTO: 10.7 FL (ref 9.2–12.9)
POTASSIUM SERPL-SCNC: 5.2 MMOL/L (ref 3.5–5.1)
RBC # BLD AUTO: 5 M/UL (ref 4.6–6.2)
SODIUM SERPL-SCNC: 133 MMOL/L (ref 136–145)
WBC # BLD AUTO: 20.03 K/UL (ref 3.9–12.7)

## 2022-04-12 PROCEDURE — G0378 HOSPITAL OBSERVATION PER HR: HCPCS

## 2022-04-12 PROCEDURE — 94761 N-INVAS EAR/PLS OXIMETRY MLT: CPT

## 2022-04-12 PROCEDURE — 99900035 HC TECH TIME PER 15 MIN (STAT)

## 2022-04-12 PROCEDURE — 85027 COMPLETE CBC AUTOMATED: CPT | Performed by: SURGERY

## 2022-04-12 PROCEDURE — 83735 ASSAY OF MAGNESIUM: CPT | Performed by: HOSPITALIST

## 2022-04-12 PROCEDURE — S4991 NICOTINE PATCH NONLEGEND: HCPCS | Performed by: EMERGENCY MEDICINE

## 2022-04-12 PROCEDURE — 25000003 PHARM REV CODE 250: Performed by: EMERGENCY MEDICINE

## 2022-04-12 PROCEDURE — 27000221 HC OXYGEN, UP TO 24 HOURS

## 2022-04-12 PROCEDURE — 36415 COLL VENOUS BLD VENIPUNCTURE: CPT | Performed by: HOSPITALIST

## 2022-04-12 PROCEDURE — 94799 UNLISTED PULMONARY SVC/PX: CPT

## 2022-04-12 PROCEDURE — 25000003 PHARM REV CODE 250: Performed by: SURGERY

## 2022-04-12 PROCEDURE — 63600175 PHARM REV CODE 636 W HCPCS: Performed by: SURGERY

## 2022-04-12 PROCEDURE — 80048 BASIC METABOLIC PNL TOTAL CA: CPT | Performed by: SURGERY

## 2022-04-12 RX ORDER — HYDROCODONE BITARTRATE AND ACETAMINOPHEN 5; 325 MG/1; MG/1
1 TABLET ORAL EVERY 4 HOURS PRN
Qty: 12 TABLET | Refills: 0 | Status: SHIPPED | OUTPATIENT
Start: 2022-04-12 | End: 2022-04-12 | Stop reason: SDUPTHER

## 2022-04-12 RX ORDER — HYDROCODONE BITARTRATE AND ACETAMINOPHEN 5; 325 MG/1; MG/1
1 TABLET ORAL EVERY 4 HOURS PRN
Qty: 12 TABLET | Refills: 0 | Status: SHIPPED | OUTPATIENT
Start: 2022-04-12 | End: 2022-11-08 | Stop reason: CLARIF

## 2022-04-12 RX ORDER — CIPROFLOXACIN 500 MG/1
500 TABLET ORAL EVERY 12 HOURS
Qty: 28 TABLET | Refills: 0 | Status: SHIPPED | OUTPATIENT
Start: 2022-04-12 | End: 2022-04-26

## 2022-04-12 RX ORDER — METRONIDAZOLE 500 MG/1
500 TABLET ORAL EVERY 8 HOURS
Qty: 42 TABLET | Refills: 0 | Status: SHIPPED | OUTPATIENT
Start: 2022-04-12 | End: 2022-04-26

## 2022-04-12 RX ADMIN — PIPERACILLIN AND TAZOBACTAM 3.38 G: 3; .375 INJECTION, POWDER, LYOPHILIZED, FOR SOLUTION INTRAVENOUS; PARENTERAL at 05:04

## 2022-04-12 RX ADMIN — HYDROMORPHONE HYDROCHLORIDE 1 MG: 1 INJECTION, SOLUTION INTRAMUSCULAR; INTRAVENOUS; SUBCUTANEOUS at 01:04

## 2022-04-12 RX ADMIN — IBUPROFEN 600 MG: 600 TABLET ORAL at 08:04

## 2022-04-12 RX ADMIN — ACETAMINOPHEN 1000 MG: 10 INJECTION INTRAVENOUS at 05:04

## 2022-04-12 RX ADMIN — NICOTINE 1 PATCH: 14 PATCH TRANSDERMAL at 09:04

## 2022-04-12 RX ADMIN — HYDROMORPHONE HYDROCHLORIDE 1 MG: 1 INJECTION, SOLUTION INTRAMUSCULAR; INTRAVENOUS; SUBCUTANEOUS at 05:04

## 2022-04-12 RX ADMIN — MUPIROCIN 1 G: 20 OINTMENT TOPICAL at 09:04

## 2022-04-12 NOTE — PLAN OF CARE
The pt is cleared for discharge home and has follow up appointments added to his AVS.    04/12/22 1100   Final Note   Assessment Type Final Discharge Note   Anticipated Discharge Disposition Home   Hospital Resources/Appts/Education Provided Appointments scheduled and added to AVS

## 2022-04-12 NOTE — CARE UPDATE
04/12/22 0726   Patient Assessment/Suction   Level of Consciousness (AVPU) alert   PRE-TX-O2   O2 Device (Oxygen Therapy) nasal cannula  (decreased to 1L)   $ Is the patient on Low Flow Oxygen? Yes   Flow (L/min) 2  (decreased to 1L)   SpO2 98 %   Pulse Oximetry Type Intermittent   $ Pulse Oximetry - Multiple Charge Pulse Oximetry - Multiple   Aerosol Therapy   $ Aerosol Therapy Charges PRN treatment not required   Incentive Spirometer   $ Incentive Spirometer Charges done with encouragement   Administration (IS) mouthpiece utilized   Number of Repetitions (IS) 10   Level Incentive Spirometer (mL) 1750   Patient Tolerance (IS) good

## 2022-04-12 NOTE — PLAN OF CARE
Problem: Infection Progression (Sepsis/Septic Shock)  Goal: Absence of Infection Signs and Symptoms  Outcome: Ongoing, Progressing     Problem: Respiratory Compromise (Pneumonia)  Goal: Effective Oxygenation and Ventilation  Outcome: Ongoing, Progressing

## 2022-04-12 NOTE — PLAN OF CARE
Patient requesting discharge home, reports feeling well. Discussed with Dr. Hutchinson. He agrees to discharge today on antibiotics with follow up with him in 2 weeks.

## 2022-04-12 NOTE — PLAN OF CARE
CM attempted to completed discharge planning assessment however pt was sleeping. I attempted to call pts niyah Jimenez at [phone] however there was no answer. I will return to follow up when pt is awake.    04/12/22 0942   Discharge Assessment   Assessment Type Discharge Planning Assessment

## 2022-04-13 NOTE — HOSPITAL COURSE
Patient was admitted to the hospital medicine service for acute gangrenous cholecystitis.  General surgeon was consulted and took patient to the OR.  Patient was given IV antibiotics.  Patient tolerated regular diet after procedure.  He was discharged home on a course of antibiotics per surgery recommendation.  He will follow up with surgeon in 1 week.  Patient was agreeable to discharge plan.  Discharge plan was discussed with surgeon on day of discharge.  Patient had a mild hyponatremia on admission which improved with IV fluids.

## 2022-04-13 NOTE — DISCHARGE SUMMARY
[facility] Hospital Medicine  Discharge Summary      Patient Name: Renard Jiemnez  MRN: [id number]  Patient Class: OP- Observation  Admission Date: 4/11/2022  Hospital Length of Stay: 1 days  Discharge Date and Time: 4/12/2022 12:00 PM  Attending Physician: No att. providers found   Discharging Provider: Julisa Herbert MD  Primary Care Provider: Lisa Olivera MD      HPI:     Mr. Jimenez is a 56 y.o. male with a PMHx of COPD, HTN, CHF, asthma and prior MI who presents to the ED with an onset of SOB since this morning. Patient also reports upper abdominal pain which started today. Patient reports recently getting out of care home and having no refills on his home medications or access to PCP in the past month. He denies fever, chills, chest pain, cough, wheezing, or lower extremity edema at this time. States his appetite is normal and is having regular BMs and has no problems with urination. ED workup remarkable for acute cholecystitis and hyponatremia. Patient denies shortness of breath at the time of my exam.       Procedure(s) (LRB):  CHOLECYSTECTOMY, LAPAROSCOPIC (N/A)      Hospital Course:   Patient was admitted to the hospital medicine service for acute gangrenous cholecystitis.  General surgeon was consulted and took patient to the OR.  Patient was given IV antibiotics.  Patient tolerated regular diet after procedure.  He was discharged home on a course of antibiotics per surgery recommendation.  He will follow up with surgeon in 1 week.  Patient was agreeable to discharge plan.  Discharge plan was discussed with surgeon on day of discharge.  Patient had a mild hyponatremia on admission which improved with IV fluids.       Goals of Care Treatment Preferences:  Code Status: Full Code      Consults:   Consults (From admission, onward)        Status Ordering Provider     Inpatient consult to Social Work/Case Management  Once        Provider:  (Not yet assigned)    Completed GUY DOBBS           No new Assessment & Plan notes have been filed under this hospital service since the last note was generated.  Service: Hospital Medicine    Final Active Diagnoses:    Diagnosis Date Noted POA    PRINCIPAL PROBLEM:  Acute cholecystitis [K81.0] 04/11/2022 Yes    Hyponatremia [E87.1] 04/11/2022 Yes    Essential hypertension [I10] 10/08/2019 Yes    Obesity, Class II, BMI 35-39.9 [E66.9] 08/28/2013 Yes    Tobacco abuse [Z72.0] 08/28/2013 Yes      Problems Resolved During this Admission:       Discharged Condition: good    Disposition: Home or Self Care    Follow Up:   Follow-up Information     Neville Hutchinson MD Follow up in 2 week(s).    Specialties: General Surgery, Colon and Rectal Surgery, Surgery  Why: see follow up with PA below  Contact information:  [address]  [address]  [place] [facility] [address]  [phone]                       Patient Instructions:      Notify your health care provider if you experience any of the following:  temperature >100.4     Notify your health care provider if you experience any of the following:  persistent nausea and vomiting or diarrhea     Notify your health care provider if you experience any of the following:  severe uncontrolled pain     Notify your health care provider if you experience any of the following:  redness, tenderness, or signs of infection (pain, swelling, redness, odor or green/yellow discharge around incision site)     Notify your health care provider if you experience any of the following:  difficulty breathing or increased cough     Notify your health care provider if you experience any of the following:  worsening rash     Activity as tolerated       Significant Diagnostic Studies: Labs:   BMP:   Recent Labs   Lab 04/12/22  0538      *   K 5.2*   CL 96   CO2 26   BUN 18   CREATININE 0.9   CALCIUM 9.1   MG 2.2    and CBC   Recent Labs   Lab 04/12/22  0538   WBC 20.03*   HGB 15.5   HCT 47.4        Radiology Results (last 7  days)    Procedure Component Value Units Date/Time   MRI MRCP Without Contrast [[id number]] Resulted: 04/11/22 1341   Order Status: Completed Updated: 04/11/22 1344   Narrative:     EXAMINATION:   MRI ABDOMEN WITHOUT CONTRAST MRCP     CLINICAL HISTORY:   general surgeon wants an MRCP prior to cholecystectomy;     TECHNIQUE:   Multiplanar, multisequence images are performed through the liver and upper abdomen.  Additionally 2D and 3D MRCP sequences are performed as well as MIP images.     COMPARISON:   None.     FINDINGS:   The gallbladder is moderately distended and exhibits mural edema and abundant pericholecystic stranding.  There is a 2.5 cm stone of the gallbladder neck.  A 1.2 cm stone is present at the gallbladder fundus.  The common bile duct is a normal caliber at 3 mm.  There is no intrahepatic biliary dilatation.     The liver, spleen, pancreas, adrenal glands, and kidneys are unremarkable.    Impression:       Features of acute cholecystitis.  2.6 cm stone at the gallbladder neck, likely impacted.  No biliary dilatation.       Electronically signed by: Vincent Willett MD   Date: 04/11/2022   Time: 13:41   CT Abdomen Pelvis With Contrast [[id number]] Resulted: 04/11/22 1114   Order Status: Completed Updated: 04/11/22 1116   Narrative:     EXAMINATION:   CT ABDOMEN PELVIS WITH CONTRAST     CLINICAL HISTORY:   ruq abdominal pain;     TECHNIQUE:   Low dose axial images, sagittal and coronal reformations were obtained from the lung bases to the pubic symphysis following the IV administration of 100 mL of Omnipaque 350 .  Oral contrast was not given.     COMPARISON:   None.     FINDINGS:   The gallbladder is distended, and exhibits moderate pericholecystic fluid and fat stranding.  A few tiny stones are present the gallbladder fundus.  There is no biliary dilatation.     Are present a few old calcified hepatic granulomas are present.  The spleen is normal in size.  The pancreas and adrenal glands are  unremarkable.  The kidneys are unremarkable.  Aerated     The bowel is nondilated.  The appendix is normal.  Her the colon is unremarkable.  There is moderate calcification of the aorta without aneurysm.  There is no acute bony abnormality.  There is degenerative change of both hips.    Impression:       Features of acute cholecystitis.       Electronically signed by: Vincent Willett MD   Date: 04/11/2022   Time: 11:14   X-Ray Chest AP Portable [[id number]] Resulted: 04/11/22 0843   Order Status: Completed Updated: 04/11/22 0845   Narrative:     EXAMINATION:   XR CHEST AP PORTABLE     CLINICAL HISTORY:   Shortness of breath     TECHNIQUE:   Single frontal view of the chest was performed.     COMPARISON:   Chest of October 8, 2019, chest x-ray of September 30, 2016     FINDINGS:   Calcification is noted in the aorta.  There is mild cardiomegaly.  There is prominence of the pulmonary vasculature.  Definite pulmonary edema is not seen..  No pneumothorax or pleural effusion is noted.    Impression:       Cardiomegaly and prominence of the pulmonary vasculature may represent borderline heart function.  Atherosclerosis noted       Electronically signed by: Toñito Pederson MD   Date: 04/11/2022   Time: 08:43   Microbiology Results (Last 90 Days)    Procedure Component Value Units Date/Time   Blood Culture #2 [[id number]] Collected: 04/11/22 1142   Order Status: Completed Specimen: Blood from Antecubital, Left Updated: 04/12/22 2012    Blood Culture, Routine No Growth to date     No Growth to date   Blood Culture #1 [[id number]] Collected: 04/11/22 1142   Order Status: Completed Specimen: Blood Updated: 04/12/22 2012    Blood Culture, Routine No Growth to date     No Growth to date         Pending Diagnostic Studies:     Procedure Component Value Units Date/Time    Specimen to Pathology, Surgery General Surgery [[id number]] Collected: 04/11/22 1638    Order Status: Sent Lab Status: In process Updated: 04/12/22 0615     Specimen: Tissue          Medications:  Reconciled Home Medications:      Medication List      START taking these medications    ciprofloxacin HCl 500 MG tablet  Commonly known as: CIPRO  Take 1 tablet (500 mg total) by mouth every 12 (twelve) hours. for 14 days     HYDROcodone-acetaminophen 5-325 mg per tablet  Commonly known as: NORCO  Take 1 tablet by mouth every 4 (four) hours as needed for Pain.     metroNIDAZOLE 500 MG tablet  Commonly known as: FLAGYL  Take 1 tablet (500 mg total) by mouth every 8 (eight) hours. for 14 days        CONTINUE taking these medications    aspirin 81 MG EC tablet  Commonly known as: ECOTRIN  Take 81 mg by mouth once daily.     furosemide 40 MG tablet  Commonly known as: LASIX  Take 40 mg by mouth 2 (two) times daily.            Indwelling Lines/Drains at time of discharge:   Lines/Drains/Airways     None                 Time spent on the discharge of patient: 35 minutes         Julisa Herbert MD  Department of Hospital Medicine  [facility]

## 2022-04-14 ENCOUNTER — TELEPHONE (OUTPATIENT)
Dept: MEDSURG UNIT | Facility: HOSPITAL | Age: 57
End: 2022-04-14
Payer: MEDICAID

## 2022-04-16 LAB
BACTERIA BLD CULT: NORMAL
BACTERIA BLD CULT: NORMAL

## 2022-04-18 LAB
FINAL PATHOLOGIC DIAGNOSIS: NORMAL
GROSS: NORMAL
Lab: NORMAL

## 2022-11-08 ENCOUNTER — HOSPITAL ENCOUNTER (OUTPATIENT)
Facility: HOSPITAL | Age: 57
Discharge: ANOTHER HOSPITAL | End: 2022-11-11
Attending: EMERGENCY MEDICINE | Admitting: INTERNAL MEDICINE
Payer: MEDICAID

## 2022-11-08 DIAGNOSIS — I63.9 STROKE: ICD-10-CM

## 2022-11-08 LAB
ALBUMIN SERPL BCP-MCNC: 4 G/DL (ref 3.5–5.2)
ALP SERPL-CCNC: 84 U/L (ref 55–135)
ALT SERPL W/O P-5'-P-CCNC: 23 U/L (ref 10–44)
ANION GAP SERPL CALC-SCNC: 16 MMOL/L (ref 8–16)
AST SERPL-CCNC: 17 U/L (ref 10–40)
BASOPHILS # BLD AUTO: 0.05 K/UL (ref 0–0.2)
BASOPHILS NFR BLD: 0.4 % (ref 0–1.9)
BILIRUB SERPL-MCNC: 0.5 MG/DL (ref 0.1–1)
BUN SERPL-MCNC: 40 MG/DL (ref 6–20)
CALCIUM SERPL-MCNC: 9.9 MG/DL (ref 8.7–10.5)
CHLORIDE SERPL-SCNC: 102 MMOL/L (ref 95–110)
CHOLEST SERPL-MCNC: 208 MG/DL (ref 120–199)
CHOLEST/HDLC SERPL: 4.2 {RATIO} (ref 2–5)
CO2 SERPL-SCNC: 21 MMOL/L (ref 23–29)
CREAT SERPL-MCNC: 2.2 MG/DL (ref 0.5–1.4)
CREAT SERPL-MCNC: 2.2 MG/DL (ref 0.5–1.4)
DIFFERENTIAL METHOD: ABNORMAL
EOSINOPHIL # BLD AUTO: 0.1 K/UL (ref 0–0.5)
EOSINOPHIL NFR BLD: 0.5 % (ref 0–8)
ERYTHROCYTE [DISTWIDTH] IN BLOOD BY AUTOMATED COUNT: 14.6 % (ref 11.5–14.5)
EST. GFR  (NO RACE VARIABLE): 34 ML/MIN/1.73 M^2
GLUCOSE SERPL-MCNC: 100 MG/DL (ref 70–110)
HCT VFR BLD AUTO: 51.4 % (ref 40–54)
HDLC SERPL-MCNC: 49 MG/DL (ref 40–75)
HDLC SERPL: 23.6 % (ref 20–50)
HGB BLD-MCNC: 16.6 G/DL (ref 14–18)
IMM GRANULOCYTES # BLD AUTO: 0.08 K/UL (ref 0–0.04)
IMM GRANULOCYTES NFR BLD AUTO: 0.7 % (ref 0–0.5)
INR PPP: 1 (ref 0.8–1.2)
LDLC SERPL CALC-MCNC: 97.6 MG/DL (ref 63–159)
LYMPHOCYTES # BLD AUTO: 2.9 K/UL (ref 1–4.8)
LYMPHOCYTES NFR BLD: 23.6 % (ref 18–48)
MCH RBC QN AUTO: 31.8 PG (ref 27–31)
MCHC RBC AUTO-ENTMCNC: 32.3 G/DL (ref 32–36)
MCV RBC AUTO: 99 FL (ref 82–98)
MONOCYTES # BLD AUTO: 1.4 K/UL (ref 0.3–1)
MONOCYTES NFR BLD: 11.6 % (ref 4–15)
NEUTROPHILS # BLD AUTO: 7.8 K/UL (ref 1.8–7.7)
NEUTROPHILS NFR BLD: 63.2 % (ref 38–73)
NONHDLC SERPL-MCNC: 159 MG/DL
NRBC BLD-RTO: 0 /100 WBC
PLATELET # BLD AUTO: 255 K/UL (ref 150–450)
PMV BLD AUTO: 10.5 FL (ref 9.2–12.9)
POC PTINR: 1.2 (ref 0.9–1.2)
POC PTWBT: 13.9 SEC (ref 9.7–14.3)
POCT GLUCOSE: 106 MG/DL (ref 70–110)
POTASSIUM SERPL-SCNC: 4.7 MMOL/L (ref 3.5–5.1)
PROT SERPL-MCNC: 7.9 G/DL (ref 6–8.4)
PROTHROMBIN TIME: 10.4 SEC (ref 9–12.5)
RBC # BLD AUTO: 5.22 M/UL (ref 4.6–6.2)
SAMPLE: ABNORMAL
SAMPLE: NORMAL
SODIUM SERPL-SCNC: 139 MMOL/L (ref 136–145)
TRIGL SERPL-MCNC: 307 MG/DL (ref 30–150)
TSH SERPL DL<=0.005 MIU/L-ACNC: 3.26 UIU/ML (ref 0.4–4)
WBC # BLD AUTO: 12.28 K/UL (ref 3.9–12.7)

## 2022-11-08 PROCEDURE — 85610 PROTHROMBIN TIME: CPT | Performed by: EMERGENCY MEDICINE

## 2022-11-08 PROCEDURE — 80053 COMPREHEN METABOLIC PANEL: CPT | Performed by: EMERGENCY MEDICINE

## 2022-11-08 PROCEDURE — 94640 AIRWAY INHALATION TREATMENT: CPT

## 2022-11-08 PROCEDURE — 99204 PR OFFICE/OUTPT VISIT, NEW, LEVL IV, 45-59 MIN: ICD-10-PCS | Mod: 95,,, | Performed by: PSYCHIATRY & NEUROLOGY

## 2022-11-08 PROCEDURE — 36415 COLL VENOUS BLD VENIPUNCTURE: CPT | Performed by: NURSE PRACTITIONER

## 2022-11-08 PROCEDURE — 36415 COLL VENOUS BLD VENIPUNCTURE: CPT | Performed by: EMERGENCY MEDICINE

## 2022-11-08 PROCEDURE — 80061 LIPID PANEL: CPT | Performed by: EMERGENCY MEDICINE

## 2022-11-08 PROCEDURE — 27000221 HC OXYGEN, UP TO 24 HOURS

## 2022-11-08 PROCEDURE — 25000003 PHARM REV CODE 250: Performed by: NURSE PRACTITIONER

## 2022-11-08 PROCEDURE — 25500020 PHARM REV CODE 255

## 2022-11-08 PROCEDURE — G0378 HOSPITAL OBSERVATION PER HR: HCPCS

## 2022-11-08 PROCEDURE — 87389 HIV-1 AG W/HIV-1&-2 AB AG IA: CPT | Performed by: EMERGENCY MEDICINE

## 2022-11-08 PROCEDURE — 85610 PROTHROMBIN TIME: CPT

## 2022-11-08 PROCEDURE — 84443 ASSAY THYROID STIM HORMONE: CPT | Performed by: EMERGENCY MEDICINE

## 2022-11-08 PROCEDURE — 99285 EMERGENCY DEPT VISIT HI MDM: CPT | Mod: 25

## 2022-11-08 PROCEDURE — 99204 OFFICE O/P NEW MOD 45 MIN: CPT | Mod: 95,,, | Performed by: PSYCHIATRY & NEUROLOGY

## 2022-11-08 PROCEDURE — 93005 ELECTROCARDIOGRAM TRACING: CPT

## 2022-11-08 PROCEDURE — 25000003 PHARM REV CODE 250: Performed by: EMERGENCY MEDICINE

## 2022-11-08 PROCEDURE — 25000242 PHARM REV CODE 250 ALT 637 W/ HCPCS: Performed by: NURSE PRACTITIONER

## 2022-11-08 PROCEDURE — 93010 ELECTROCARDIOGRAM REPORT: CPT | Mod: ,,, | Performed by: GENERAL PRACTICE

## 2022-11-08 PROCEDURE — 86803 HEPATITIS C AB TEST: CPT | Performed by: EMERGENCY MEDICINE

## 2022-11-08 PROCEDURE — 99900035 HC TECH TIME PER 15 MIN (STAT)

## 2022-11-08 PROCEDURE — 94760 N-INVAS EAR/PLS OXIMETRY 1: CPT

## 2022-11-08 PROCEDURE — 82962 GLUCOSE BLOOD TEST: CPT

## 2022-11-08 PROCEDURE — 93010 EKG 12-LEAD: ICD-10-PCS | Mod: ,,, | Performed by: GENERAL PRACTICE

## 2022-11-08 PROCEDURE — 85025 COMPLETE CBC W/AUTO DIFF WBC: CPT | Performed by: EMERGENCY MEDICINE

## 2022-11-08 PROCEDURE — 83036 HEMOGLOBIN GLYCOSYLATED A1C: CPT | Performed by: NURSE PRACTITIONER

## 2022-11-08 PROCEDURE — 94761 N-INVAS EAR/PLS OXIMETRY MLT: CPT

## 2022-11-08 RX ORDER — SODIUM CHLORIDE 0.9 % (FLUSH) 0.9 %
10 SYRINGE (ML) INJECTION
Status: DISCONTINUED | OUTPATIENT
Start: 2022-11-08 | End: 2022-11-11 | Stop reason: HOSPADM

## 2022-11-08 RX ORDER — SERTRALINE HYDROCHLORIDE 50 MG/1
50 TABLET, FILM COATED ORAL NIGHTLY
Status: ON HOLD | COMMUNITY
End: 2022-11-11 | Stop reason: HOSPADM

## 2022-11-08 RX ORDER — GABAPENTIN 300 MG/1
300 CAPSULE ORAL 2 TIMES DAILY
COMMUNITY

## 2022-11-08 RX ORDER — IPRATROPIUM BROMIDE AND ALBUTEROL SULFATE 2.5; .5 MG/3ML; MG/3ML
3 SOLUTION RESPIRATORY (INHALATION)
Status: DISCONTINUED | OUTPATIENT
Start: 2022-11-08 | End: 2022-11-11 | Stop reason: HOSPADM

## 2022-11-08 RX ORDER — CLOPIDOGREL BISULFATE 75 MG/1
75 TABLET ORAL DAILY
Status: DISCONTINUED | OUTPATIENT
Start: 2022-11-09 | End: 2022-11-08

## 2022-11-08 RX ORDER — LISINOPRIL 10 MG/1
10 TABLET ORAL DAILY
Status: ON HOLD | COMMUNITY
End: 2022-11-11 | Stop reason: SDUPTHER

## 2022-11-08 RX ORDER — AMOXICILLIN 250 MG
1 CAPSULE ORAL 2 TIMES DAILY
Status: DISCONTINUED | OUTPATIENT
Start: 2022-11-08 | End: 2022-11-11 | Stop reason: HOSPADM

## 2022-11-08 RX ORDER — SODIUM CHLORIDE 9 MG/ML
INJECTION, SOLUTION INTRAVENOUS ONCE
Status: COMPLETED | OUTPATIENT
Start: 2022-11-08 | End: 2022-11-09

## 2022-11-08 RX ORDER — GABAPENTIN 300 MG/1
300 CAPSULE ORAL
Status: COMPLETED | OUTPATIENT
Start: 2022-11-08 | End: 2022-11-08

## 2022-11-08 RX ORDER — ASPIRIN 81 MG/1
81 TABLET ORAL DAILY
Status: DISCONTINUED | OUTPATIENT
Start: 2022-11-09 | End: 2022-11-11 | Stop reason: HOSPADM

## 2022-11-08 RX ORDER — CLOPIDOGREL BISULFATE 75 MG/1
300 TABLET ORAL ONCE
Status: COMPLETED | OUTPATIENT
Start: 2022-11-08 | End: 2022-11-08

## 2022-11-08 RX ORDER — ASPIRIN 325 MG
325 TABLET ORAL
Status: COMPLETED | OUTPATIENT
Start: 2022-11-08 | End: 2022-11-08

## 2022-11-08 RX ORDER — ENOXAPARIN SODIUM 100 MG/ML
40 INJECTION SUBCUTANEOUS EVERY 12 HOURS
Status: DISCONTINUED | OUTPATIENT
Start: 2022-11-08 | End: 2022-11-08

## 2022-11-08 RX ORDER — BUPROPION HYDROCHLORIDE 150 MG/1
150 TABLET ORAL DAILY
COMMUNITY

## 2022-11-08 RX ORDER — MONTELUKAST SODIUM 10 MG/1
10 TABLET ORAL NIGHTLY
COMMUNITY
End: 2023-01-20

## 2022-11-08 RX ORDER — PANTOPRAZOLE SODIUM 40 MG/1
40 TABLET, DELAYED RELEASE ORAL DAILY
COMMUNITY
Start: 2022-09-24

## 2022-11-08 RX ORDER — LISINOPRIL AND HYDROCHLOROTHIAZIDE 20; 25 MG/1; MG/1
1 TABLET ORAL DAILY
Status: ON HOLD | COMMUNITY
Start: 2022-10-28 | End: 2022-11-11 | Stop reason: HOSPADM

## 2022-11-08 RX ORDER — BUPROPION HYDROCHLORIDE 150 MG/1
150 TABLET ORAL DAILY
Status: DISCONTINUED | OUTPATIENT
Start: 2022-11-09 | End: 2022-11-11 | Stop reason: HOSPADM

## 2022-11-08 RX ORDER — LABETALOL HYDROCHLORIDE 5 MG/ML
10 INJECTION, SOLUTION INTRAVENOUS
Status: DISCONTINUED | OUTPATIENT
Start: 2022-11-08 | End: 2022-11-11 | Stop reason: HOSPADM

## 2022-11-08 RX ORDER — CLOPIDOGREL BISULFATE 75 MG/1
75 TABLET ORAL DAILY
Status: DISCONTINUED | OUTPATIENT
Start: 2022-11-09 | End: 2022-11-11 | Stop reason: HOSPADM

## 2022-11-08 RX ORDER — SIMVASTATIN 20 MG/1
1 TABLET, FILM COATED ORAL DAILY
Status: ON HOLD | COMMUNITY
End: 2022-11-11 | Stop reason: HOSPADM

## 2022-11-08 RX ORDER — ATORVASTATIN CALCIUM 40 MG/1
40 TABLET, FILM COATED ORAL DAILY
Status: DISCONTINUED | OUTPATIENT
Start: 2022-11-09 | End: 2022-11-10

## 2022-11-08 RX ORDER — SERTRALINE HYDROCHLORIDE 50 MG/1
50 TABLET, FILM COATED ORAL NIGHTLY
Status: DISCONTINUED | OUTPATIENT
Start: 2022-11-09 | End: 2022-11-11 | Stop reason: HOSPADM

## 2022-11-08 RX ORDER — ONDANSETRON 2 MG/ML
8 INJECTION INTRAMUSCULAR; INTRAVENOUS EVERY 6 HOURS PRN
Status: DISCONTINUED | OUTPATIENT
Start: 2022-11-08 | End: 2022-11-11 | Stop reason: HOSPADM

## 2022-11-08 RX ORDER — PANTOPRAZOLE SODIUM 40 MG/1
40 TABLET, DELAYED RELEASE ORAL DAILY
Status: DISCONTINUED | OUTPATIENT
Start: 2022-11-09 | End: 2022-11-11 | Stop reason: HOSPADM

## 2022-11-08 RX ORDER — IBUPROFEN 200 MG
1 TABLET ORAL DAILY
Status: DISCONTINUED | OUTPATIENT
Start: 2022-11-09 | End: 2022-11-11 | Stop reason: HOSPADM

## 2022-11-08 RX ORDER — MONTELUKAST SODIUM 10 MG/1
10 TABLET ORAL NIGHTLY
Status: DISCONTINUED | OUTPATIENT
Start: 2022-11-09 | End: 2022-11-11 | Stop reason: HOSPADM

## 2022-11-08 RX ORDER — GABAPENTIN 300 MG/1
300 CAPSULE ORAL 2 TIMES DAILY
Status: DISCONTINUED | OUTPATIENT
Start: 2022-11-09 | End: 2022-11-11 | Stop reason: HOSPADM

## 2022-11-08 RX ADMIN — IOHEXOL 100 ML: 350 INJECTION, SOLUTION INTRAVENOUS at 08:11

## 2022-11-08 RX ADMIN — ASPIRIN 325 MG: 325 TABLET ORAL at 09:11

## 2022-11-08 RX ADMIN — GABAPENTIN 300 MG: 300 CAPSULE ORAL at 10:11

## 2022-11-08 RX ADMIN — SENNOSIDES AND DOCUSATE SODIUM 1 TABLET: 50; 8.6 TABLET ORAL at 10:11

## 2022-11-08 RX ADMIN — IPRATROPIUM BROMIDE AND ALBUTEROL SULFATE 3 ML: 2.5; .5 SOLUTION RESPIRATORY (INHALATION) at 11:11

## 2022-11-08 RX ADMIN — CLOPIDOGREL BISULFATE 300 MG: 75 TABLET, FILM COATED ORAL at 10:11

## 2022-11-09 PROBLEM — N17.9 AKI (ACUTE KIDNEY INJURY): Status: ACTIVE | Noted: 2022-11-09

## 2022-11-09 PROBLEM — E66.01 SEVERE OBESITY (BMI >= 40): Status: ACTIVE | Noted: 2022-11-09

## 2022-11-09 PROBLEM — E78.2 MIXED HYPERLIPIDEMIA: Status: ACTIVE | Noted: 2022-11-09

## 2022-11-09 PROBLEM — E87.1 HYPONATREMIA: Status: RESOLVED | Noted: 2022-04-11 | Resolved: 2022-11-09

## 2022-11-09 PROBLEM — I65.23 BILATERAL CAROTID ARTERY STENOSIS: Status: ACTIVE | Noted: 2022-11-09

## 2022-11-09 PROBLEM — I50.32 CHRONIC HEART FAILURE WITH PRESERVED EJECTION FRACTION: Status: ACTIVE | Noted: 2022-11-09

## 2022-11-09 PROBLEM — J96.11 CHRONIC RESPIRATORY FAILURE WITH HYPOXIA: Status: ACTIVE | Noted: 2022-11-09

## 2022-11-09 PROBLEM — K81.0 ACUTE CHOLECYSTITIS: Status: RESOLVED | Noted: 2022-04-11 | Resolved: 2022-11-09

## 2022-11-09 PROBLEM — D75.89 MACROCYTOSIS: Status: ACTIVE | Noted: 2022-11-09

## 2022-11-09 LAB
ALBUMIN SERPL BCP-MCNC: 3.4 G/DL (ref 3.5–5.2)
ALP SERPL-CCNC: 94 U/L (ref 55–135)
ALT SERPL W/O P-5'-P-CCNC: 18 U/L (ref 10–44)
AMPHET+METHAMPHET UR QL: NEGATIVE
ANION GAP SERPL CALC-SCNC: 10 MMOL/L (ref 8–16)
AORTIC ROOT ANNULUS: 2.74 CM
AORTIC VALVE CUSP SEPERATION: 1.19 CM
APTT BLDCRRT: 24.5 SEC (ref 21–32)
AST SERPL-CCNC: 14 U/L (ref 10–40)
AV INDEX (PROSTH): 0.89
AV MEAN GRADIENT: 11 MMHG
AV PEAK GRADIENT: 18 MMHG
AV VALVE AREA: 3.66 CM2
AV VELOCITY RATIO: 0.64
BARBITURATES UR QL SCN>200 NG/ML: NEGATIVE
BASOPHILS # BLD AUTO: 0.05 K/UL (ref 0–0.2)
BASOPHILS NFR BLD: 0.5 % (ref 0–1.9)
BENZODIAZ UR QL SCN>200 NG/ML: NEGATIVE
BILIRUB SERPL-MCNC: 0.3 MG/DL (ref 0.1–1)
BILIRUB UR QL STRIP: NEGATIVE
BSA FOR ECHO PROCEDURE: 2.61 M2
BUN SERPL-MCNC: 52 MG/DL (ref 6–20)
BZE UR QL SCN: NEGATIVE
CALCIUM SERPL-MCNC: 8.9 MG/DL (ref 8.7–10.5)
CANNABINOIDS UR QL SCN: ABNORMAL
CHLORIDE SERPL-SCNC: 103 MMOL/L (ref 95–110)
CK MB SERPL-MCNC: 1 NG/ML (ref 0.1–6.5)
CK MB SERPL-RTO: 1.7 % (ref 0–5)
CK SERPL-CCNC: 60 U/L (ref 20–200)
CLARITY UR: CLEAR
CO2 SERPL-SCNC: 25 MMOL/L (ref 23–29)
COLOR UR: YELLOW
CREAT SERPL-MCNC: 1.8 MG/DL (ref 0.5–1.4)
CREAT UR-MCNC: 156.2 MG/DL (ref 23–375)
CV ECHO LV RWT: 0.44 CM
DIFFERENTIAL METHOD: ABNORMAL
DOP CALC AO PEAK VEL: 2.14 M/S
DOP CALC AO VTI: 38.7 CM
DOP CALC LVOT AREA: 4.1 CM2
DOP CALC LVOT DIAMETER: 2.29 CM
DOP CALC LVOT PEAK VEL: 1.38 M/S
DOP CALC LVOT STROKE VOLUME: 141.61 CM3
DOP CALC MV VTI: 19.9 CM
DOP CALCLVOT PEAK VEL VTI: 34.4 CM
E WAVE DECELERATION TIME: 260.57 MSEC
E/A RATIO: 0.86
E/E' RATIO: 8.11 M/S
ECHO LV POSTERIOR WALL: 1.17 CM (ref 0.6–1.1)
EJECTION FRACTION: 60 %
EOSINOPHIL # BLD AUTO: 0.1 K/UL (ref 0–0.5)
EOSINOPHIL NFR BLD: 0.6 % (ref 0–8)
ERYTHROCYTE [DISTWIDTH] IN BLOOD BY AUTOMATED COUNT: 14.8 % (ref 11.5–14.5)
EST. GFR  (NO RACE VARIABLE): 43 ML/MIN/1.73 M^2
ESTIMATED AVG GLUCOSE: 111 MG/DL (ref 68–131)
FOLATE SERPL-MCNC: 6.7 NG/ML (ref 4–24)
FRACTIONAL SHORTENING: 20 % (ref 28–44)
GLUCOSE SERPL-MCNC: 89 MG/DL (ref 70–110)
GLUCOSE UR QL STRIP: NEGATIVE
HBA1C MFR BLD: 5.5 % (ref 4–5.6)
HCT VFR BLD AUTO: 48 % (ref 40–54)
HCV AB SERPL QL IA: NORMAL
HGB BLD-MCNC: 15.1 G/DL (ref 14–18)
HGB UR QL STRIP: ABNORMAL
HIV 1+2 AB+HIV1 P24 AG SERPL QL IA: NORMAL
IMM GRANULOCYTES # BLD AUTO: 0.05 K/UL (ref 0–0.04)
IMM GRANULOCYTES NFR BLD AUTO: 0.5 % (ref 0–0.5)
INR PPP: 1 (ref 0.8–1.2)
INTERVENTRICULAR SEPTUM: 1.2 CM (ref 0.6–1.1)
IVRT: 114.18 MSEC
KETONES UR QL STRIP: NEGATIVE
LA MAJOR: 5.4 CM
LA MINOR: 3.79 CM
LEFT INTERNAL DIMENSION IN SYSTOLE: 4.22 CM (ref 2.1–4)
LEFT VENTRICLE DIASTOLIC VOLUME INDEX: 53.95 ML/M2
LEFT VENTRICLE DIASTOLIC VOLUME: 134.33 ML
LEFT VENTRICLE MASS INDEX: 101 G/M2
LEFT VENTRICLE SYSTOLIC VOLUME INDEX: 16.9 ML/M2
LEFT VENTRICLE SYSTOLIC VOLUME: 42.16 ML
LEFT VENTRICULAR INTERNAL DIMENSION IN DIASTOLE: 5.28 CM (ref 3.5–6)
LEFT VENTRICULAR MASS: 250.62 G
LEUKOCYTE ESTERASE UR QL STRIP: NEGATIVE
LV LATERAL E/E' RATIO: 7 M/S
LV SEPTAL E/E' RATIO: 9.63 M/S
LVOT MG: 4.1 MMHG
LVOT MV: 0.93 CM/S
LYMPHOCYTES # BLD AUTO: 2.4 K/UL (ref 1–4.8)
LYMPHOCYTES NFR BLD: 22 % (ref 18–48)
MAGNESIUM SERPL-MCNC: 2.3 MG/DL (ref 1.6–2.6)
MCH RBC QN AUTO: 31.2 PG (ref 27–31)
MCHC RBC AUTO-ENTMCNC: 31.5 G/DL (ref 32–36)
MCV RBC AUTO: 99 FL (ref 82–98)
METHADONE UR QL SCN>300 NG/ML: NEGATIVE
MONOCYTES # BLD AUTO: 1.2 K/UL (ref 0.3–1)
MONOCYTES NFR BLD: 11 % (ref 4–15)
MV A" WAVE DURATION": 175.07 MSEC
MV MEAN GRADIENT: 1 MMHG
MV PEAK A VEL: 0.9 M/S
MV PEAK E VEL: 0.77 M/S
MV PEAK GRADIENT: 4 MMHG
MV STENOSIS PRESSURE HALF TIME: 59.02 MS
MV VALVE AREA BY CONTINUITY EQUATION: 7.12 CM2
MV VALVE AREA P 1/2 METHOD: 3.73 CM2
NEUTROPHILS # BLD AUTO: 7.1 K/UL (ref 1.8–7.7)
NEUTROPHILS NFR BLD: 65.4 % (ref 38–73)
NITRITE UR QL STRIP: NEGATIVE
NRBC BLD-RTO: 0 /100 WBC
OPIATES UR QL SCN: NEGATIVE
PCP UR QL SCN>25 NG/ML: NEGATIVE
PH UR STRIP: 6 [PH] (ref 5–8)
PHOSPHATE SERPL-MCNC: 6.1 MG/DL (ref 2.7–4.5)
PISA MRMAX VEL: 4.93 M/S
PISA TR MAX VEL: 3.23 M/S
PLATELET # BLD AUTO: 219 K/UL (ref 150–450)
PMV BLD AUTO: 11.2 FL (ref 9.2–12.9)
POCT GLUCOSE: 100 MG/DL (ref 70–110)
POTASSIUM SERPL-SCNC: 4.8 MMOL/L (ref 3.5–5.1)
PROT SERPL-MCNC: 6.5 G/DL (ref 6–8.4)
PROT UR QL STRIP: NEGATIVE
PROTHROMBIN TIME: 10.3 SEC (ref 9–12.5)
PULM VEIN S/D RATIO: 1.24
PV MV: 0.89 M/S
PV PEAK D VEL: 0.72 M/S
PV PEAK S VEL: 0.89 M/S
PV PEAK VELOCITY: 1.35 CM/S
RA MAJOR: 5.7 CM
RA PRESSURE: 3 MMHG
RA VOL SYS: 61.76 ML
RA WIDTH: 3.56 CM
RBC # BLD AUTO: 4.84 M/UL (ref 4.6–6.2)
RIGHT VENTRICULAR END-DIASTOLIC DIMENSION: 3.93 CM
RIGHT VENTRICULAR LENGTH IN DIASTOLE (APICAL 4-CHAMBER VIEW): 71.4 CM
RV TISSUE DOPPLER FREE WALL SYSTOLIC VELOCITY 1 (APICAL 4 CHAMBER VIEW): 0.01 CM/S
SODIUM SERPL-SCNC: 138 MMOL/L (ref 136–145)
SP GR UR STRIP: 1.01 (ref 1–1.03)
TDI LATERAL: 0.11 M/S
TDI SEPTAL: 0.08 M/S
TDI: 0.1 M/S
TOXICOLOGY INFORMATION: ABNORMAL
TR MAX PG: 42 MMHG
TRICUSPID ANNULAR PLANE SYSTOLIC EXCURSION: 3.17 CM
TROPONIN I SERPL DL<=0.01 NG/ML-MCNC: 0.01 NG/ML (ref 0–0.03)
TV REST PULMONARY ARTERY PRESSURE: 45 MMHG
URN SPEC COLLECT METH UR: ABNORMAL
UROBILINOGEN UR STRIP-ACNC: NEGATIVE EU/DL
VIT B12 SERPL-MCNC: 245 PG/ML (ref 210–950)
WBC # BLD AUTO: 10.84 K/UL (ref 3.9–12.7)

## 2022-11-09 PROCEDURE — 92610 EVALUATE SWALLOWING FUNCTION: CPT

## 2022-11-09 PROCEDURE — 82553 CREATINE MB FRACTION: CPT | Performed by: NURSE PRACTITIONER

## 2022-11-09 PROCEDURE — 25000242 PHARM REV CODE 250 ALT 637 W/ HCPCS: Performed by: NURSE PRACTITIONER

## 2022-11-09 PROCEDURE — S4991 NICOTINE PATCH NONLEGEND: HCPCS | Performed by: NURSE PRACTITIONER

## 2022-11-09 PROCEDURE — G0378 HOSPITAL OBSERVATION PER HR: HCPCS

## 2022-11-09 PROCEDURE — 85025 COMPLETE CBC W/AUTO DIFF WBC: CPT | Performed by: NURSE PRACTITIONER

## 2022-11-09 PROCEDURE — 81003 URINALYSIS AUTO W/O SCOPE: CPT | Mod: 59 | Performed by: NURSE PRACTITIONER

## 2022-11-09 PROCEDURE — 25000003 PHARM REV CODE 250: Performed by: NURSE PRACTITIONER

## 2022-11-09 PROCEDURE — 99900035 HC TECH TIME PER 15 MIN (STAT)

## 2022-11-09 PROCEDURE — 84484 ASSAY OF TROPONIN QUANT: CPT | Performed by: NURSE PRACTITIONER

## 2022-11-09 PROCEDURE — 82746 ASSAY OF FOLIC ACID SERUM: CPT | Performed by: NURSE PRACTITIONER

## 2022-11-09 PROCEDURE — 94618 PULMONARY STRESS TESTING: CPT

## 2022-11-09 PROCEDURE — 94761 N-INVAS EAR/PLS OXIMETRY MLT: CPT

## 2022-11-09 PROCEDURE — 80307 DRUG TEST PRSMV CHEM ANLYZR: CPT | Performed by: NURSE PRACTITIONER

## 2022-11-09 PROCEDURE — 85610 PROTHROMBIN TIME: CPT | Performed by: NURSE PRACTITIONER

## 2022-11-09 PROCEDURE — 80053 COMPREHEN METABOLIC PANEL: CPT | Performed by: NURSE PRACTITIONER

## 2022-11-09 PROCEDURE — 83735 ASSAY OF MAGNESIUM: CPT | Performed by: NURSE PRACTITIONER

## 2022-11-09 PROCEDURE — 97165 OT EVAL LOW COMPLEX 30 MIN: CPT

## 2022-11-09 PROCEDURE — 85730 THROMBOPLASTIN TIME PARTIAL: CPT | Performed by: NURSE PRACTITIONER

## 2022-11-09 PROCEDURE — 94640 AIRWAY INHALATION TREATMENT: CPT | Mod: XB

## 2022-11-09 PROCEDURE — 84100 ASSAY OF PHOSPHORUS: CPT | Performed by: NURSE PRACTITIONER

## 2022-11-09 PROCEDURE — 94640 AIRWAY INHALATION TREATMENT: CPT

## 2022-11-09 PROCEDURE — 82607 VITAMIN B-12: CPT | Performed by: NURSE PRACTITIONER

## 2022-11-09 PROCEDURE — 97535 SELF CARE MNGMENT TRAINING: CPT

## 2022-11-09 PROCEDURE — 27000221 HC OXYGEN, UP TO 24 HOURS

## 2022-11-09 PROCEDURE — 97161 PT EVAL LOW COMPLEX 20 MIN: CPT

## 2022-11-09 PROCEDURE — 36415 COLL VENOUS BLD VENIPUNCTURE: CPT | Performed by: NURSE PRACTITIONER

## 2022-11-09 RX ADMIN — SODIUM CHLORIDE: 0.9 INJECTION, SOLUTION INTRAVENOUS at 12:11

## 2022-11-09 RX ADMIN — GABAPENTIN 300 MG: 300 CAPSULE ORAL at 09:11

## 2022-11-09 RX ADMIN — GABAPENTIN 300 MG: 300 CAPSULE ORAL at 08:11

## 2022-11-09 RX ADMIN — IPRATROPIUM BROMIDE AND ALBUTEROL SULFATE 3 ML: 2.5; .5 SOLUTION RESPIRATORY (INHALATION) at 07:11

## 2022-11-09 RX ADMIN — ATORVASTATIN CALCIUM 40 MG: 40 TABLET, FILM COATED ORAL at 08:11

## 2022-11-09 RX ADMIN — BUPROPION HYDROCHLORIDE 150 MG: 150 TABLET, FILM COATED, EXTENDED RELEASE ORAL at 08:11

## 2022-11-09 RX ADMIN — SERTRALINE HYDROCHLORIDE 50 MG: 50 TABLET ORAL at 09:11

## 2022-11-09 RX ADMIN — CLOPIDOGREL BISULFATE 75 MG: 75 TABLET ORAL at 08:11

## 2022-11-09 RX ADMIN — Medication 1 PATCH: at 08:11

## 2022-11-09 RX ADMIN — SENNOSIDES AND DOCUSATE SODIUM 1 TABLET: 50; 8.6 TABLET ORAL at 09:11

## 2022-11-09 RX ADMIN — PANTOPRAZOLE SODIUM 40 MG: 40 TABLET, DELAYED RELEASE ORAL at 08:11

## 2022-11-09 RX ADMIN — IPRATROPIUM BROMIDE AND ALBUTEROL SULFATE 3 ML: 2.5; .5 SOLUTION RESPIRATORY (INHALATION) at 11:11

## 2022-11-09 RX ADMIN — SENNOSIDES AND DOCUSATE SODIUM 1 TABLET: 50; 8.6 TABLET ORAL at 08:11

## 2022-11-09 RX ADMIN — MONTELUKAST 10 MG: 10 TABLET, FILM COATED ORAL at 09:11

## 2022-11-09 RX ADMIN — ASPIRIN 81 MG: 81 TABLET, COATED ORAL at 08:11

## 2022-11-09 RX ADMIN — IPRATROPIUM BROMIDE AND ALBUTEROL SULFATE 3 ML: 2.5; .5 SOLUTION RESPIRATORY (INHALATION) at 03:11

## 2022-11-09 NOTE — ASSESSMENT & PLAN NOTE
Pt states he takes nebs q4h for his emphysema  He should be on a controller  Will continue home regimen for now
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
## 2022-11-09 NOTE — PT/OT/SLP EVAL
Occupational Therapy   Evaluation and Discharge Note    Name: Renard Jimenez  MRN: [id number]  Admitting Diagnosis:  Stroke   Recent Surgery: * No surgery found *      Recommendations:     Discharge Recommendations: home  Discharge Equipment Recommendations:  none  Barriers to discharge:  None    Assessment:     Renard Jimenez is a 57 y.o. male with a medical diagnosis of Stroke. At this time, patient is independent with most ADLs with exception to LB dressing. At baseline, patient requires assistance with LB dressing. Patient does not require further acute OT services.     Plan:     During this hospitalization, patient does not require further acute OT services.  Please re-consult if situation changes.    Plan of Care Reviewed with: patient    Subjective     Chief Complaint: none  Patient/Family Comments/goals: none    Occupational Profile:  Living Environment: Patient lives with spouse in a [facility].   Previous level of function: Patient was independent with ADLs and mobility.   Equipment Used at home:  none  Assistance upon Discharge: Patient will receive assistance from spouse if needed.     Pain/Comfort:  Pain Rating 1: 0/10  Pain Rating Post-Intervention 1: 0/10    Patients cultural, spiritual, Yazidi conflicts given the current situation:      Objective:     Communicated with: nurse Turner prior to session.  Patient found sitting edge of bed with peripheral IV, oxygen, telemetry upon OT entry to room.    General Precautions: Standard, fall   Orthopedic Precautions:N/A   Braces: N/A  Respiratory Status: Nasal cannula, flow 2 L/min     Occupational Performance:    Bed Mobility:    Patient completed Scooting/Bridging with modified independence  Patient completed Supine to Sit with modified independence  Patient completed Sit to Supine with modified independence    Functional Mobility/Transfers:  Patient completed Sit <> Stand Transfer with modified independence  with  no assistive device   Patient completed  Toilet Transfer Stand Pivot technique with modified independence with  no AD    Activities of Daily Living:  Grooming: independence with all grooming tasks while standing at sink  Upper Body Dressing: independence   Lower Body Dressing: moderate assistance to don/doff socks while seated EOB. Patient is at baseline with LB dressing.  Toileting: independence     Cognitive/Visual Perceptual:  Cognitive/Psychosocial Skills:     -       Oriented to: x4   -       Follows Commands/attention:Follows multistep  commands  -       Communication: clear/fluent  -       Safety awareness/insight to disability: intact   -       Mood/Affect/Coping skills/emotional control: Appropriate to situation and Cooperative  Visual/Perceptual:      -Intact     Physical Exam:  Postural examination/scapula alignment:    -       Rounded shoulders  -       Forward head  Upper Extremity Range of Motion:     -       Right Upper Extremity: WFL  -       Left Upper Extremity: WFL  Upper Extremity Strength:    -       Right Upper Extremity: WFL  -       Left Upper Extremity: WFL   Strength:    -       Right Upper Extremity: WFL  -       Left Upper Extremity: WFL  Fine Motor Coordination:    -       Intact  Gross motor coordination:   WFL    AMPAC 6 Click ADL:  AMPAC Total Score: 23    Treatment & Education:  OT ed pt on OT role & POC as well as discharge recommendations.      Patient left sitting edge of bed with all lines intact, call button in reach, and nurse notified    GOALS:   Multidisciplinary Problems       Occupational Therapy Goals       Not on file                    History:     Past Medical History:   Diagnosis Date    Asthma     CHF (congestive heart failure)     COPD (chronic obstructive pulmonary disease)     Hypertension     MI, old          Past Surgical History:   Procedure Laterality Date    LAPAROSCOPIC CHOLECYSTECTOMY N/A 4/11/2022    Procedure: CHOLECYSTECTOMY, LAPAROSCOPIC;  Surgeon: Neville Hutchinson MD;  Location: [facility] OR;   Service: General;  Laterality: N/A;    TONSILLECTOMY         Time Tracking:     OT Date of Treatment: 11/09/22  OT Start Time: 0858  OT Stop Time: 0922  OT Total Time (min): 24 min    Billable Minutes:Evaluation 10  Self Care/Home Management 14    11/9/2022
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
## 2022-11-09 NOTE — ASSESSMENT & PLAN NOTE
Patient with acute kidney injury likely due to pre-renal azotemia SHAKIR is currently trending. Labs reviewed- Renal function/electrolytes with Estimated Creatinine Clearance: 62.8 mL/min (A) (based on SCr of 1.8 mg/dL (H)). according to latest data. Monitor urine output and serial BMP and adjust therapy as needed. Avoid nephrotoxins and renally dose meds for GFR listed above.     Given contrast for CTA  IV hydration 1L at 125mL/hr given HFpEF 67% (echo 2016)  Hold ACEI/ARB/NSAID/Coxib  Daily chemistries

## 2022-11-09 NOTE — ED PROVIDER NOTES
Encounter Date: 11/8/2022    SCRIBE #1 NOTE: I, Maribeth Strauss, am scribing for, and in the presence of,  Zack Da Silva MD.     History     Chief Complaint   Patient presents with    Weakness     Around 630 tonight was eating and started having weakness and numbness to right side with inability to speak. Resolved now.      Time seen by provider: 8:34 PM on 11/08/2022    Renard Jimenez is a 57 y.o. male who presents to the ED with right sided weakness and numbness, right sided facial droop, and speech difficulty that bean 2 hours ago. Pt's wife reports that pt had food in his mouth and he was unaware that he did. Pt was unable to talk at 6:30 PM when this episode began. Pt states right sided weakness and numbness is getting better. Pt denies Hx of DM, HTN, and HLD. Pt is a smoker. The patient denies fever, congestion, cough, chest pain, abdominal pain, HA, or any other symptoms at this time. PMHx of HTN, COPD, CHF, and MI. No pertinent PSHx.     The history is provided by the patient and the spouse.   Review of patient's allergies indicates:   Allergen Reactions    Maple flavor Other (See Comments)     Throat swelling       Past Medical History:   Diagnosis Date    Asthma     CHF (congestive heart failure)     COPD (chronic obstructive pulmonary disease)     Hypertension     MI, old      Past Surgical History:   Procedure Laterality Date    LAPAROSCOPIC CHOLECYSTECTOMY N/A 4/11/2022    Procedure: CHOLECYSTECTOMY, LAPAROSCOPIC;  Surgeon: Neville Hutchinson MD;  Location: [facility];  Service: General;  Laterality: N/A;    TONSILLECTOMY       Family History   Problem Relation Age of Onset    Hypertension Father      Social History     Tobacco Use    Smoking status: Every Day     Packs/day: 1.00     Years: 30.00     Pack years: 30.00     Types: Cigarettes   Substance Use Topics    Alcohol use: Yes     Comment: a little    Drug use: Yes     Types: Marijuana     Comment: 3 to 4 times a week     Review of Systems    Constitutional:  Negative for fever.   HENT:  Negative for congestion.    Respiratory:  Negative for cough.    Cardiovascular:  Negative for chest pain.   Gastrointestinal:  Negative for abdominal pain.   Musculoskeletal:  Negative for arthralgias.   Skin:  Negative for pallor.   Neurological:  Positive for facial asymmetry, speech difficulty, weakness and numbness. Negative for headaches.   Hematological:  Does not bruise/bleed easily.   Psychiatric/Behavioral:  Negative for agitation.      Physical Exam     Initial Vitals [11/08/22 2037]   BP Pulse Resp Temp SpO2   (!) 116/57 72 20 98.1 °F (36.7 °C) (!) 92 %      MAP       --         Physical Exam    Nursing note and vitals reviewed.  Constitutional: He appears well-developed and well-nourished. No distress.   HENT:   Head: Normocephalic and atraumatic.   Eyes: Conjunctivae and EOM are normal.   Left pupil is 4 mm and right pupil is 3 mm.    Neck: Neck supple.   Cardiovascular:  Normal rate, regular rhythm and normal heart sounds.     Exam reveals no gallop and no friction rub.       No murmur heard.  Pulmonary/Chest: Breath sounds normal. No respiratory distress. He has no wheezes. He has no rhonchi. He has no rales.   Abdominal: Abdomen is soft. Bowel sounds are normal. He exhibits no distension. There is no abdominal tenderness.   Musculoskeletal:         General: No tenderness or edema. Normal range of motion.      Cervical back: Neck supple.     Neurological: He is alert and oriented to person, place, and time.   5/5 strength with intact sensation to BUE's and BLE's. Right sided facial droop. Dysarthria.        Skin: Skin is warm and dry.   Psychiatric: He has a normal mood and affect.       ED Course   Critical Care    Date/Time: 11/8/2022 11:44 PM  Performed by: Zack Da Silva MD  Authorized by: Zack Da Silva MD   Total critical care time (exclusive of procedural time) : 0 minutes  Critical care was necessary to treat or prevent imminent or  life-threatening deterioration of the following conditions: CNS failure or compromise.  Critical care was time spent personally by me on the following activities: discussions with consultants, examination of patient, ordering and review of laboratory studies, ordering and review of radiographic studies, pulse oximetry and re-evaluation of patient's condition.      Labs Reviewed   CBC W/ AUTO DIFFERENTIAL - Abnormal; Notable for the following components:       Result Value    MCV 99 (*)     MCH 31.8 (*)     RDW 14.6 (*)     Immature Granulocytes 0.7 (*)     Gran # (ANC) 7.8 (*)     Immature Grans (Abs) 0.08 (*)     Mono # 1.4 (*)     All other components within normal limits   COMPREHENSIVE METABOLIC PANEL - Abnormal; Notable for the following components:    CO2 21 (*)     BUN 40 (*)     Creatinine 2.2 (*)     eGFR 34 (*)     All other components within normal limits   LIPID PANEL - Abnormal; Notable for the following components:    Cholesterol 208 (*)     Triglycerides 307 (*)     All other components within normal limits   ISTAT CREATININE - Abnormal; Notable for the following components:    POC Creatinine 2.2 (*)     All other components within normal limits   PROTIME-INR   TSH   HIV 1 / 2 ANTIBODY   HEPATITIS C ANTIBODY   HEMOGLOBIN A1C   DRUG SCREEN PANEL, URINE EMERGENCY   URINALYSIS, REFLEX TO URINE CULTURE   POCT GLUCOSE, HAND-HELD DEVICE   POCT GLUCOSE   ISTAT PROCEDURE          Imaging Results              CTA Head and Neck (xpd) (Final result)  Result time 11/08/22 21:22:22      Final result by Yuliya Cano MD (11/08/22 21:22:22)                   Impression:      No acute intracranial abnormality.    Significant stenosis involving the right carotid bifurcation and proximal ICA.    50% stenosis of the proximal ICA on the left.    Multifocal significant stenosis of the left vertebral artery.      Electronically signed by: Yuliya Cano  Date:    11/08/2022  Time:    21:22               Narrative:     EXAMINATION:  CTA HEAD AND NECK (XPD)    CLINICAL HISTORY:  Neuro deficit, acute, stroke suspected;Stroke/TIA, determine embolic source;    TECHNIQUE:  Non contrast low dose axial images were obtained through the head. CT angiogram was performed from the level of the avery to the top of the head following the IV administration of 100mL of Omnipaque 350.   Sagittal and coronal reconstructions and maximum intensity projection reconstructions were performed. Arterial stenosis percentages are based on NASCET measurement criteria.    COMPARISON:  None    FINDINGS:  Intracranial Compartment:    Ventricles and sulci are normal in size for age without evidence of hydrocephalus. No extra-axial blood or fluid collections.    The brain parenchyma appears normal. No parenchymal mass, hemorrhage, edema, or major vascular distribution infarct.    Skull/Extracranial Contents (limited evaluation): No bony calvarial fracture.  Left mastoid air cells are opacified.  Right mastoid air cells and paranasal sinuses are essentially clear.    Non-Vascular Structures of the Neck/Thoracic Inlet (limited evaluation): Normal.    Aorta: Normal 3 vessel arch.    Extracranial carotid circulation: Right carotid at the bifurcation demonstrates significant stenosis on the order of 70 % at the bifurcation and 80% in the proximal ICA.  On the left there is heavy atherosclerotic calcification at the bifurcation and 50% stenosis in the proximal ICA.  No Aneurysmal dilatation, or dissection.    Extracranial vertebral circulation: There is multifocal left vertebral artery significant stenosis.  Right vertebral artery is patent and dominant.  There is no aneurysmal dilatation, or dissection.    Intracranial Arteries: No focal high-grade stenosis, occlusion, or aneurysm.    Venous structures (limited evaluation): Normal.                                       Medications   sodium chloride 0.9% flush 10 mL (has no administration in time range)   sodium  chloride 0.9% bolus 500 mL (has no administration in time range)   labetaloL injection 10 mg (has no administration in time range)   aspirin EC tablet 81 mg (has no administration in time range)   atorvastatin tablet 40 mg (has no administration in time range)   nicotine 21 mg/24 hr 1 patch (has no administration in time range)   ondansetron injection 8 mg (has no administration in time range)   senna-docusate 8.6-50 mg per tablet 1 tablet (1 tablet Oral Given 11/8/22 2251)   clopidogreL tablet 75 mg (has no administration in time range)   0.9%  NaCl infusion (has no administration in time range)   albuterol-ipratropium 2.5 mg-0.5 mg/3 mL nebulizer solution 3 mL (3 mLs Nebulization Given 11/8/22 2306)   buPROPion TB24 tablet 150 mg (has no administration in time range)   gabapentin capsule 300 mg (has no administration in time range)   montelukast tablet 10 mg (has no administration in time range)   pantoprazole EC tablet 40 mg (has no administration in time range)   sertraline tablet 50 mg (has no administration in time range)   iohexoL (OMNIPAQUE 350) 350 mg iodine/mL injection (100 mLs Intravenous Given 11/8/22 2055)   aspirin tablet 325 mg (325 mg Oral Given 11/8/22 2131)   gabapentin capsule 300 mg (300 mg Oral Given 11/8/22 2207)   clopidogreL tablet 300 mg (300 mg Oral Given 11/8/22 2251)     Medical Decision Making:   History:   I obtained history from: someone other than patient.  Old Medical Records: I decided to obtain old medical records.  Independently Interpreted Test(s):   I have ordered and independently interpreted EKG Reading(s) - see prior notes  Clinical Tests:   Lab Tests: Ordered and Reviewed  Radiological Study: Ordered and Reviewed  Medical Tests: Ordered and Reviewed  Last known normal: 6:30 PM  Provider contact time: 8:34 PM  Head CT Read by MD: 5 minutes after the ct head was completed in the ct scan room  Neurology consult ordered by ED MD: 8:39 PM    VAN positive? No (weakness plus any  other cortical finding. If so, call stroke alert out to 24 hours):    Weakness: Yes    Visual changes: No  Aphasia: No  Neglect: No    NIH score: 1  TPa administered: No secondary to improving symptoms.         Scribe Attestation:   Scribe #1: I performed the above scribed service and the documentation accurately describes the services I performed. I attest to the accuracy of the note.      ED Course as of 11/09/22 0006   Tue Nov 08, 2022 2040 I saw the patient immediately upon arrival at approximately 8:34 p.m..  Patient has what appears to be NIH stroke scale of 3.  He has got some mild-to-moderate dysarthria with some right facial droop.  Upper lower extremities are equal sensation and strength.  CT head ordered as well as CTA.  Neurology consult ordered.  Patient may be a tPA candidate if he does not have intracranial bleeding. [JS]   2113 EKG independently interpreted by me as rate 98 sinus rhythm with sinus arrhythmia incomplete right bundle-branch block no ST segment elevation or depression. [JS]   2124 Patient has been seen by tele neurology.  They do not recommend tPA secondary to improvement in symptoms at this time.  Patient's NIH stroke scale is a 1.  He does not appear to be an embolectomy candidate and if no occlusion on CTA head neck will proceed with Plavix 300 mg per stroke neurologist and admission here. [JS]   Wed Nov 09, 2022 0004 I reassessed the patient.  He has no worsening mental status or neurologic changes..  He will be admitted for MRI.  Hospital Medicine has seen the patient.  Patient was given Plavix 300 mg.  He is not a tPA candidate secondary to improving symptoms. [JS]      ED Course User Index  [JS] Zack Da Silva MD        I, Dr. Zack Da Silva personally performed the services described in this documentation. All medical record entries made by the scribe were at my direction and in my presence.  I have reviewed the chart and agree that the record reflects my personal  performance and is accurate and complete. Zack Da Silva MD.  12:05 AM 11/09/2022    DISCLAIMER: This note was prepared with Dragon NaturallySpeaking voice recognition transcription software. Garbled syntax, mangled pronouns, and other bizarre constructions may be attributed to that software system          Clinical Impression:   Final diagnoses:  [I63.9] Stroke        ED Disposition Condition    Observation                 Zack Da Silva MD  11/09/22 0006

## 2022-11-09 NOTE — PLAN OF CARE
Problem: Cerebral Tissue Perfusion (Stroke, Ischemic/Transient Ischemic Attack)  Goal: Optimal Cerebral Tissue Perfusion  Outcome: Ongoing, Progressing     Problem: Cognitive Impairment (Stroke, Ischemic/Transient Ischemic Attack)  Goal: Optimal Cognitive Function  Outcome: Ongoing, Progressing   Patient alert and oriented resting in bed. NAD. Denies pain or SOB. VSS. Urinal at bedside. SCDs in place. Tele monitor on. Bed alarm active. Plan of care reviewed with patient. Verbalizes understanding.Call light in reach. Pt free from fall or injury. Will monitor.

## 2022-11-09 NOTE — CONSULTS
Food & Nutrition                                                           Education     Diet Education: Cardiac Diet, Diet for weight loss  Time Spent: 15 minutes  Learners: Patient        Nutrition Education provided with handouts: yes        Comments: Patient admitted with possible stroke. PMH COPD, asthma, CHF, HTN, MI, smoking. PTA pt has been on a regular diet, but in the past he was following a diabetic diet for weight loss ( added whole grains and more fruits/vegetables into his diet) and lost ~ 100 lb ( used to be around 370 lb and lowest wt recorded is 244 lb 2019. They usually cook at home, don't eat out much, and he says he would like to go back on roldan diet to lose weight. I educated pt on the benefits of eating whole grain carbs and more plant foods such as fruits, vegetables, and beans. We discussed the role of fiber in lowering cholesterol and the importance of limiting sodium as well. Pt agreeable to plan, was interested in ideas for snacks, recommended foods, and meal plans which I provided.     Assessment: NFPE WDL 11/9/22. Good appetite.  All questions and concerns answered. Dietitian's contact information provided.         Follow-Up: yes     Please Re-consult as needed           Thanks!
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
## 2022-11-09 NOTE — SUBJECTIVE & OBJECTIVE
Past Medical History:   Diagnosis Date    Asthma     CHF (congestive heart failure)     COPD (chronic obstructive pulmonary disease)     Hypertension     MI, old        Past Surgical History:   Procedure Laterality Date    LAPAROSCOPIC CHOLECYSTECTOMY N/A 4/11/2022    Procedure: CHOLECYSTECTOMY, LAPAROSCOPIC;  Surgeon: Neville Hutchinson MD;  Location: [facility];  Service: General;  Laterality: N/A;    TONSILLECTOMY         Review of patient's allergies indicates:   Allergen Reactions    Maple flavor Other (See Comments)     Throat swelling         No current facility-administered medications on file prior to encounter.     Current Outpatient Medications on File Prior to Encounter   Medication Sig    ALBUTEROL INHL Inhale into the lungs.    buPROPion (WELLBUTRIN XL) 150 MG TB24 tablet Take 150 mg by mouth once daily.    gabapentin (NEURONTIN) 300 MG capsule Take 300 mg by mouth 2 (two) times daily.    lisinopriL 10 MG tablet Take 10 mg by mouth once daily.    lisinopriL-hydrochlorothiazide (PRINZIDE,ZESTORETIC) 20-25 mg Tab Take 1 tablet by mouth once daily.    montelukast (SINGULAIR) 10 mg tablet Take 10 mg by mouth every evening.    pantoprazole (PROTONIX) 40 MG tablet Take 40 mg by mouth once daily.    sertraline (ZOLOFT) 50 MG tablet Take 50 mg by mouth every evening.    simvastatin (ZOCOR) 20 MG tablet Take 1 tablet by mouth once daily.     Family History       Problem Relation (Age of Onset)    Hypertension Father          Tobacco Use    Smoking status: Every Day     Packs/day: 1.00     Years: 30.00     Pack years: 30.00     Types: Cigarettes    Smokeless tobacco: Not on file   Substance and Sexual Activity    Alcohol use: Yes     Comment: a little    Drug use: Yes     Types: Marijuana     Comment: 3 to 4 times a week    Sexual activity: Yes     Partners: Female     Review of Systems   Constitutional:  Positive for fatigue. Negative for chills, diaphoresis and fever.   HENT:  Negative for congestion, ear pain,  sore throat and trouble swallowing.    Eyes:  Negative for pain, discharge and visual disturbance.   Respiratory:  Positive for wheezing. Negative for cough, chest tightness and shortness of breath.    Cardiovascular:  Negative for chest pain, palpitations and leg swelling.   Gastrointestinal:  Negative for abdominal distention, abdominal pain, blood in stool, constipation, diarrhea, nausea and vomiting.   Endocrine: Negative for polydipsia, polyphagia and polyuria.   Genitourinary:  Negative for dysuria, flank pain, frequency and urgency.   Musculoskeletal:  Negative for back pain, joint swelling, neck pain and neck stiffness.   Skin:  Negative for rash and wound.   Allergic/Immunologic: Negative for immunocompromised state.   Neurological:  Positive for facial asymmetry, speech difficulty, weakness and numbness. Negative for dizziness, syncope, light-headedness and headaches.   Hematological:  Negative for adenopathy.   Psychiatric/Behavioral:  Negative for confusion and suicidal ideas. The patient is not nervous/anxious.    All other systems reviewed and are negative.  Objective:     Vital Signs (Most Recent):  Temp: 98.1 °F (36.7 °C) (11/08/22 2037)  Pulse: 86 (11/09/22 0013)  Resp: 16 (11/09/22 0013)  BP: 137/61 (11/09/22 0013)  SpO2: (!) 90 % (11/09/22 0013)   Vital Signs (24h Range):  Temp:  [98.1 °F (36.7 °C)] 98.1 °F (36.7 °C)  Pulse:  [69-92] 86  Resp:  [16-26] 16  SpO2:  [90 %-97 %] 90 %  BP: ()/(54-92) 137/61     Weight: (!) 136.7 kg (301 lb 5.9 oz)  Body mass index is 43.87 kg/m².    Physical Exam  Vitals and nursing note reviewed.   Constitutional:       Appearance: He is well-developed.      Comments: Chronically ill appearing, obese male, who smells of cigarette smoke   HENT:      Head: Normocephalic and atraumatic.   Eyes:      Conjunctiva/sclera: Conjunctivae normal.      Pupils: Pupils are equal, round, and reactive to light.   Cardiovascular:      Rate and Rhythm: Normal rate and regular  rhythm.      Pulses: Normal pulses.      Heart sounds: Normal heart sounds.   Pulmonary:      Effort: Pulmonary effort is normal.      Breath sounds: Wheezing present.      Comments: Mild exp wheezes in bilat upper lobes  Abdominal:      General: Bowel sounds are normal.      Palpations: Abdomen is soft.   Musculoskeletal:         General: Normal range of motion.      Cervical back: Normal range of motion and neck supple.   Skin:     General: Skin is warm and dry.      Capillary Refill: Capillary refill takes less than 2 seconds.   Neurological:      General: No focal deficit present.      Mental Status: He is alert and oriented to person, place, and time.   Psychiatric:         Behavior: Behavior normal.         Thought Content: Thought content normal.         Judgment: Judgment normal.         CRANIAL NERVES     CN III, IV, VI   Pupils are equal, round, and reactive to light.     Significant Labs: All pertinent labs within the past 24 hours have been reviewed.  CBC:   Recent Labs   Lab 11/08/22 2040   WBC 12.28   HGB 16.6   HCT 51.4        CMP:   Recent Labs   Lab 11/08/22 2040      K 4.7      CO2 21*      BUN 40*   CREATININE 2.2*   CALCIUM 9.9   PROT 7.9   ALBUMIN 4.0   BILITOT 0.5   ALKPHOS 84   AST 17   ALT 23   ANIONGAP 16     Coagulation:   Recent Labs   Lab 11/08/22 2040   INR 1.0       Significant Imaging: I have reviewed all pertinent imaging results/findings within the past 24 hours.  EKG: I have reviewed all pertinent results/findings within the past 24 hours and my personal findings are: NSR with sinus arrhythmia and PACs, incomplete RBBB, no acute ischemic changes    CTA Head and Neck (xpd)    Result Date: 11/8/2022  EXAMINATION: CTA HEAD AND NECK (XPD) CLINICAL HISTORY: Neuro deficit, acute, stroke suspected;Stroke/TIA, determine embolic source; TECHNIQUE: Non contrast low dose axial images were obtained through the head. CT angiogram was performed from the level of the  avery to the top of the head following the IV administration of 100mL of Omnipaque 350.   Sagittal and coronal reconstructions and maximum intensity projection reconstructions were performed. Arterial stenosis percentages are based on NASCET measurement criteria. COMPARISON: None FINDINGS: Intracranial Compartment: Ventricles and sulci are normal in size for age without evidence of hydrocephalus. No extra-axial blood or fluid collections. The brain parenchyma appears normal. No parenchymal mass, hemorrhage, edema, or major vascular distribution infarct. Skull/Extracranial Contents (limited evaluation): No bony calvarial fracture.  Left mastoid air cells are opacified.  Right mastoid air cells and paranasal sinuses are essentially clear. Non-Vascular Structures of the Neck/Thoracic Inlet (limited evaluation): Normal. Aorta: Normal 3 vessel arch. Extracranial carotid circulation: Right carotid at the bifurcation demonstrates significant stenosis on the order of 70 % at the bifurcation and 80% in the proximal ICA.  On the left there is heavy atherosclerotic calcification at the bifurcation and 50% stenosis in the proximal ICA.  No Aneurysmal dilatation, or dissection. Extracranial vertebral circulation: There is multifocal left vertebral artery significant stenosis.  Right vertebral artery is patent and dominant.  There is no aneurysmal dilatation, or dissection. Intracranial Arteries: No focal high-grade stenosis, occlusion, or aneurysm. Venous structures (limited evaluation): Normal.     No acute intracranial abnormality. Significant stenosis involving the right carotid bifurcation and proximal ICA. 50% stenosis of the proximal ICA on the left. Multifocal significant stenosis of the left vertebral artery. Electronically signed by: Yuliya Cano Date:    11/08/2022 Time:    21:22

## 2022-11-09 NOTE — CONSULTS
[facility]  Vascular Neurology  Comprehensive Stroke Center  TeleVascular Neurology Acute Consultation Note      Consults    Consulting Provider: JD MARTIN  Current Providers  No providers found    Patient Location:  [facility] EMERGENCY DEPARTMENT Emergency Department  Spoke hospital nurse at bedside with patient assisting consultant.     Patient information was obtained from patient.         Assessment/Plan:     [facility] ED, Symptoms started at 6:30 p.m. and are improving. He does have improving right upper and right lower extremity weakness and numbness. He is equal in the upper and lower extremities at this time. His symptoms are improving. NIHSS-2. Alteplase not recommended due to patient back to neurological baseline and mild non-disabling symptoms.    CT head - no acute intra-cranial process.  Oral aspirin 81 mg now.  Head of bed flat, IV Fluids, permissive hypertension  CTA head and neck with and without contrast with : Right carotid at the bifurcation demonstrates significant stenosis on the order of 70 % at the bifurcation and 80% in the proximal ICA.  On the left there is heavy atherosclerotic calcification at the bifurcation and 50% stenosis in the proximal ICA.  No Aneurysmal dilatation, or dissection. No LVO.      Neuro consult if in house available or transfer for neuro consult  Vascular surgery consult according to MRI brain  Stroke/TIA work-up with MRI brain, Echo, PT/OT/ Speech and swallow evaluation.  As CTA -ve for intracranial occlusion, recommend loading Plavix load 300 mg today per POINT/CHANCE protocol today and from tomorrow 81 mg aspirin and plavix 75 mg for 21 days followed by mono antiplatelet.        Diagnoses:   Stroke like symptoms    STROKE DOCUMENTATION     Acute Stroke Times:   Acute Stroke Times   Last Known Normal Date: 11/08/22  Last Known Normal Time: 1830  Symptom Onset Date: 11/08/22  Symptom Onset Time: 1830  Stroke Team Called  Date: 11/08/22  Stroke Team Called Time: 2038  Stroke Team Arrival Date: 11/08/22  Stroke Team Arrival Time: 2041  CT completed but images not available for review - spoke to document results: 2055    NIH Scale:  1a. Level of Consciousness: 0-->Alert, keenly responsive  1b. LOC Questions: 0-->Answers both questions correctly  1c. LOC Commands: 0-->Performs both tasks correctly  2. Best Gaze: 0-->Normal  3. Visual: 0-->No visual loss  4. Facial Palsy: 1-->Minor paralysis (flattened nasolabial fold, asymmetry on smiling)  5a. Motor Arm, Left: 0-->No drift, limb holds 90 (or 45) degrees for full 10 secs  5b. Motor Arm, Right: 0-->No drift, limb holds 90 (or 45) degrees for full 10 secs  6a. Motor Leg, Left: 0-->No drift, leg holds 30 degree position for full 5 secs  6b. Motor Leg, Right: 0-->No drift, leg holds 30 degree position for full 5 secs  7. Limb Ataxia: 0-->Absent  8. Sensory: 0-->Normal, no sensory loss  9. Best Language: 0-->No aphasia, normal  10. Dysarthria: 1-->Mild-to-moderate dysarthria, patient slurs at least some words and, at worst, can be understood with some difficulty  11. Extinction and Inattention (formerly Neglect): 0-->No abnormality  Total (NIH Stroke Scale): 2     Modified [place]    [place] Coma Scale:    ABCD2 Score:    [id number] Score:   HAS -BLED Score:   ICH Score:   Hunt & Rudolph Classification:       Blood pressure (!) 118/56, pulse 80, temperature 98.1 °F (36.7 °C), temperature source Oral, resp. rate (!) 24, weight (!) 136.7 kg (301 lb 5.9 oz), SpO2 95 %.  Eligible for thrombolytic therapy?: Yes  Thrombolytic therapy recomended: Alteplase not recommended due to Patient back to neurological baseline and Mild Non-Disabling Symptoms  Possible Interventional Revascularization Candidate? No; No large vessel occlusion identified on imaging     Disposition Recommendation: pending further studies  admit to inpatient    Subjective:     History of Present Illness:  [facility] ED, Symptoms  started at 6:30 p.m. and are improving. He does have improving right upper and right lower extremity weakness and numbness. He is equal in the upper and lower extremities at this time. Symptoms started at 6:30 p.m. and are improving.      Current Facility-Administered Medications:     iohexoL (OMNIPAQUE 350) 350 mg iodine/mL injection, , , ,     Current Outpatient Medications:     ALBUTEROL INHL, Inhale into the lungs., Disp: , Rfl:     buPROPion (WELLBUTRIN XL) 150 MG TB24 tablet, Take 150 mg by mouth once daily., Disp: , Rfl:     gabapentin (NEURONTIN) 300 MG capsule, Take 300 mg by mouth 2 (two) times daily., Disp: , Rfl:         Woke up with symptoms?: no    Recent bleeding noted: no  Does the patient take any Blood Thinners? unknown  Medications: No relevant medications      Past Medical History: no relevant history    Past Surgical History: no major surgeries within the last 2 weeks    Family History: no relevant history    Social History: unable to obtain    Allergies: Maple Flavor No relevant allergies    Review of Systems  Objective:   Vitals: Blood pressure (!) 116/57, pulse 72, temperature 98.1 °F (36.7 °C), temperature source Oral, resp. rate 20, SpO2 (!) 92 %. BP: 116/57    CT READ: Yes  No hemmorhage. No mass effect. No early infarct signs.     Physical Exam      Recommended the emergency room physician to have a brief discussion with the patient and/or family if available regarding the  risks and benefits of treatment, and to briefly document the occurrence of that discussion in his clinical encounter note.     The attending portion of this evaluation, treatment, and documentation was performed per Angel Luis Drummond MD via audiovisual.    Billing code:  (non-intervention mild to moderate stroke, TIA, some mimics)      This patient has a critical neurological condition/illness, with some potential for high morbidity and mortality.  There is a moderate probability for acute neurological change  leading to clinical and possibly life-threatening deterioration requiring highest level of physician preparedness for urgent intervention.  Care was coordinated with other physicians involved in the patient's care.  Radiologic studies and laboratory data were reviewed and interpreted, and plan of care was re-assessed based on the results.  Diagnosis, treatment options and prognosis may have been discussed with the patient and/or family members or caregiver.    In your opinion, this was a: Tier 1 Van Negative    Consult End Time: 10:02 PM     Angel Luis Drummond MD  [facility] Stroke [facility]  Vascular Neurology   [facility]

## 2022-11-09 NOTE — ASSESSMENT & PLAN NOTE
Patient with acute kidney injury likely due to pre-renal azotemia SHAKIR is currently trending. Labs reviewed- Renal function/electrolytes with Estimated Creatinine Clearance: 50.6 mL/min (A) (based on SCr of 2.2 mg/dL (H)). according to latest data. Monitor urine output and serial BMP and adjust therapy as needed. Avoid nephrotoxins and renally dose meds for GFR listed above.     Given contrast for CTA  IV hydration 1L at 125mL/hr given HFpEF 67% (echo 2016)  Hold ACEI/ARB/NSAID/Coxib  Daily chemistries

## 2022-11-09 NOTE — NURSING
Pt arrived to unit via wheelchair. Admit assessment completed. Fall risk reviewed with patient. Cardiac monitoring placed. Urinal provided for specimen. Plan of care review with patient. Bed in lowest position. Bed alarm active. Call light in reach. Pt verbalize understanding. Will continue to monitor.

## 2022-11-09 NOTE — CARE UPDATE
11/09/22 1228   Home Oxygen Qualification   $ Home O2 Qualification Pulmonary Stress Test/6 min walk;Tech time 15 minutes   Room Air SpO2 At Rest 93 %   Room Air SpO2 During Ambulation (!) 85 %   SpO2 During Ambulation on O2 94 %   Heart Rate on O2 91 bpm   Ambulation O2 LPM 3 LPM   SpO2 Post Ambulation 95 %   Post Ambulation Heart Rate 70 bpm   Post Ambulation O2 LPM 2 LPM   Home O2 Eval Comments Pt qualifies for home O2     Pt ambulated approximately 25 feet. He stated he was too SOB to continue. Placed him on NC 3L while ambulating back to room. He did recover quickly on 2L

## 2022-11-09 NOTE — ASSESSMENT & PLAN NOTE
"Admit to PCU  Consulted telestroke in ED appreciate recs - "As CTA -ve for intracranial occlusion, recommend loading Plavix load 300 mg today per POINT/CHANCE protocol today and from tomorrow 81 mg aspirin and plavix 75 mg for 21 days followed by mono antiplatelet."  MRI brain in AM   Echo with bubble   Continue appropriate home meds  Consult PT/OT/SLP  Bedside swallow, if passed may have Cardiac diet  Permissive HTN for 24 hours with prn labetolol for BP over 220/110  ASA/Statin/plavix - loaded with plavix as per telestroke rec's  NIH/Neuro check per protocol  Fall prxn  Consult neurology - appreciate recommendation   On pathway  "

## 2022-11-09 NOTE — ASSESSMENT & PLAN NOTE
"Admit to PCU  Consulted telestroke in ED appreciate recs - "As CTA -ve for intracranial occlusion, recommend loading Plavix load 300 mg today per POINT/CHANCE protocol today and from tomorrow 81 mg aspirin and plavix 75 mg for 21 days followed by mono antiplatelet."  MRI brain in AM   Echo with bubble   Continue appropriate home meds  Consult PT/OT/SLP  Bedside swallow, if passed may have Cardiac diet  Permissive HTN for 24 hours with prn labetolol for BP over 220/110  ASA/Statin/plavix - loaded with plavix as per telestroke rec's  NIH/Neuro check per protocol  Fall prxn  Consult neurology   On pathway  "

## 2022-11-09 NOTE — SUBJECTIVE & OBJECTIVE
Interval History:  Patient seen and examined.  No acute events since admission.  Reports that his symptoms have resolved completely.  His appetite is good.      Review of Systems   Constitutional:  Negative for appetite change, chills and fever.   Respiratory:  Negative for cough, shortness of breath and wheezing.    Cardiovascular:  Negative for chest pain, palpitations and leg swelling.   Gastrointestinal:  Negative for abdominal pain, diarrhea, nausea and vomiting.   Genitourinary:  Negative for difficulty urinating, dysuria, hematuria and urgency.   Neurological:  Positive for facial asymmetry, speech difficulty, weakness and numbness. Negative for dizziness and syncope.   Psychiatric/Behavioral:  Negative for confusion. The patient is not nervous/anxious.    Objective:     Vital Signs (Most Recent):  Temp: 98 °F (36.7 °C) (11/09/22 1105)  Pulse: 66 (11/09/22 1105)  Resp: 20 (11/09/22 1105)  BP: 137/68 (11/09/22 1105)  SpO2: 97 % (11/09/22 1105) Vital Signs (24h Range):  Temp:  [97.7 °F (36.5 °C)-98.6 °F (37 °C)] 98 °F (36.7 °C)  Pulse:  [61-92] 66  Resp:  [16-26] 20  SpO2:  [90 %-97 %] 97 %  BP: ()/(51-92) 137/68     Weight: (!) 140.2 kg (309 lb 1.4 oz)  Body mass index is 44.99 kg/m².    Intake/Output Summary (Last 24 hours) at 11/9/2022 1206  Last data filed at 11/9/2022 1003  Gross per 24 hour   Intake 720 ml   Output 1000 ml   Net -280 ml      Physical Exam  Constitutional:       General: He is not in acute distress.     Appearance: He is obese. He is not ill-appearing, toxic-appearing or diaphoretic.   Cardiovascular:      Rate and Rhythm: Normal rate and regular rhythm.      Pulses: Normal pulses.      Heart sounds: Normal heart sounds.   Pulmonary:      Effort: Pulmonary effort is normal. No respiratory distress.      Breath sounds: Normal breath sounds. No wheezing.   Abdominal:      General: Bowel sounds are normal. There is no distension.      Palpations: Abdomen is soft.      Tenderness: There  is no abdominal tenderness. There is no guarding.   Musculoskeletal:      Right lower leg: No edema.      Left lower leg: No edema.   Neurological:      Mental Status: He is alert and oriented to person, place, and time. Mental status is at baseline.      Motor: No weakness.      Coordination: Coordination normal.       Significant Labs: All pertinent labs within the past 24 hours have been reviewed.  CBC:   Recent Labs   Lab 11/08/22 2040 11/09/22  0435   WBC 12.28 10.84   HGB 16.6 15.1   HCT 51.4 48.0    219     CMP:   Recent Labs   Lab 11/08/22 2040 11/09/22  0434    138   K 4.7 4.8    103   CO2 21* 25    89   BUN 40* 52*   CREATININE 2.2* 1.8*   CALCIUM 9.9 8.9   PROT 7.9 6.5   ALBUMIN 4.0 3.4*   BILITOT 0.5 0.3   ALKPHOS 84 94   AST 17 14   ALT 23 18   ANIONGAP 16 10     Urine Studies:   Recent Labs   Lab 11/09/22  0640   COLORU Yellow   APPEARANCEUA Clear   PHUR 6.0   SPECGRAV 1.015   PROTEINUA Negative   GLUCUA Negative   KETONESU Negative   BILIRUBINUA Negative   OCCULTUA Trace*   NITRITE Negative   UROBILINOGEN Negative   LEUKOCYTESUR Negative       Significant Imaging: I have reviewed all pertinent imaging results/findings within the past 24 hours.    CTA HEAD AND NECK (XPD)     CLINICAL HISTORY:   Neuro deficit, acute, stroke suspected;Stroke/TIA, determine embolic source;     TECHNIQUE:   Non contrast low dose axial images were obtained through the head. CT angiogram was performed from the level of the avery to the top of the head following the IV administration of 100mL of Omnipaque 350.   Sagittal and coronal reconstructions and maximum intensity projection reconstructions were performed. Arterial stenosis percentages are based on NASCET measurement criteria.     COMPARISON:   None     FINDINGS:   Intracranial Compartment:     Ventricles and sulci are normal in size for age without evidence of hydrocephalus. No extra-axial blood or fluid collections.     The brain  parenchyma appears normal. No parenchymal mass, hemorrhage, edema, or major vascular distribution infarct.     Skull/Extracranial Contents (limited evaluation): No bony calvarial fracture.  Left mastoid air cells are opacified.  Right mastoid air cells and paranasal sinuses are essentially clear.     Non-Vascular Structures of the Neck/Thoracic Inlet (limited evaluation): Normal.     Aorta: Normal 3 vessel arch.     Extracranial carotid circulation: Right carotid at the bifurcation demonstrates significant stenosis on the order of 70 % at the bifurcation and 80% in the proximal ICA.  On the left there is heavy atherosclerotic calcification at the bifurcation and 50% stenosis in the proximal ICA.  No Aneurysmal dilatation, or dissection.     Extracranial vertebral circulation: There is multifocal left vertebral artery significant stenosis.  Right vertebral artery is patent and dominant.  There is no aneurysmal dilatation, or dissection.     Intracranial Arteries: No focal high-grade stenosis, occlusion, or aneurysm.     Venous structures (limited evaluation): Normal.    Impression:       No acute intracranial abnormality.     Significant stenosis involving the right carotid bifurcation and proximal ICA.     50% stenosis of the proximal ICA on the left.     Multifocal significant stenosis of the left vertebral artery.       Electronically signed by: Yuliya Cano   Date: 11/08/2022   Time: 21:22

## 2022-11-09 NOTE — CARE UPDATE
11/09/22 0700   Patient Assessment/Suction   Level of Consciousness (AVPU) alert   All Lung Fields Breath Sounds diminished   Cough Frequency no cough   PRE-TX-O2   O2 Device (Oxygen Therapy) nasal cannula   $ Is the patient on Low Flow Oxygen? Yes   Flow (L/min) 2   SpO2 96 %   Pulse Oximetry Type Intermittent   $ Pulse Oximetry - Multiple Charge Pulse Oximetry - Multiple   Pulse 67   Resp 20   Aerosol Therapy   $ Aerosol Therapy Charges Aerosol Treatment   Respiratory Treatment Status (SVN) given   Treatment Route (SVN) mask   Patient Position (SVN) sitting on edge of bed   Post Treatment Assessment (SVN) breath sounds unchanged   Signs of Intolerance (SVN) none   Breath Sounds Post-Respiratory Treatment   Post-treatment Heart Rate (beats/min) 65   Post-treatment Resp Rate (breaths/min) 20
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
## 2022-11-09 NOTE — ASSESSMENT & PLAN NOTE
MRI pending  Consult neurology  Will need vascular surgery consult at some point pending MRI and neurology recs

## 2022-11-09 NOTE — ASSESSMENT & PLAN NOTE
Patient's COPD is controlled currently.  Patient is currently off COPD Pathway. Continue scheduled inhalers Supplemental oxygen and monitor respiratory status closely.   Will continue home Advair  Home oxygen eval
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
## 2022-11-09 NOTE — PLAN OF CARE
Problem: Adjustment to Illness (Stroke, Ischemic/Transient Ischemic Attack)  Goal: Optimal Coping  Outcome: Ongoing, Progressing     Problem: Bowel Elimination Impaired (Stroke, Ischemic/Transient Ischemic Attack)  Goal: Effective Bowel Elimination  Outcome: Ongoing, Progressing     Problem: Cerebral Tissue Perfusion (Stroke, Ischemic/Transient Ischemic Attack)  Goal: Optimal Cerebral Tissue Perfusion  Outcome: Ongoing, Progressing     Problem: Cognitive Impairment (Stroke, Ischemic/Transient Ischemic Attack)  Goal: Optimal Cognitive Function  Outcome: Ongoing, Progressing     Problem: Communication Impairment (Stroke, Ischemic/Transient Ischemic Attack)  Goal: Improved Communication Skills  Outcome: Ongoing, Progressing     Problem: Functional Ability Impaired (Stroke, Ischemic/Transient Ischemic Attack)  Goal: Optimal Functional Ability  Outcome: Ongoing, Progressing     Problem: Respiratory Compromise (Stroke, Ischemic/Transient Ischemic Attack)  Goal: Effective Oxygenation and Ventilation  Outcome: Ongoing, Progressing     Problem: Sensorimotor Impairment (Stroke, Ischemic/Transient Ischemic Attack)  Goal: Improved Sensorimotor Function  Outcome: Ongoing, Progressing     Problem: Swallowing Impairment (Stroke, Ischemic/Transient Ischemic Attack)  Goal: Optimal Eating and Swallowing without Aspiration  Outcome: Ongoing, Progressing     Problem: Urinary Elimination Impaired (Stroke, Ischemic/Transient Ischemic Attack)  Goal: Effective Urinary Elimination  Outcome: Ongoing, Progressing     Problem: Fall Injury Risk  Goal: Absence of Fall and Fall-Related Injury  Outcome: Ongoing, Progressing     Problem: Adult Inpatient Plan of Care  Goal: Plan of Care Review  Outcome: Ongoing, Progressing  Goal: Patient-Specific Goal (Individualized)  Outcome: Ongoing, Progressing  Goal: Absence of Hospital-Acquired Illness or Injury  Outcome: Ongoing, Progressing  Goal: Optimal Comfort and Wellbeing  Outcome: Ongoing,  Progressing  Goal: Readiness for Transition of Care  Outcome: Ongoing, Progressing     Problem: Bariatric Environmental Safety  Goal: Safety Maintained with Care  Outcome: Ongoing, Progressing     Problem: Infection  Goal: Absence of Infection Signs and Symptoms  Outcome: Ongoing, Progressing

## 2022-11-09 NOTE — PT/OT/SLP EVAL
Physical Therapy Evaluation and Discharge Note    Patient Name:  Renard Jimenez   MRN:  [id number]    Recommendations:     Discharge Recommendations:  home   Discharge Equipment Recommendations: none   Barriers to discharge: None    Assessment:     Renard Jimenez is a 57 y.o. male admitted with a medical diagnosis of Stroke. .  At this time, patient is functioning at their prior level of function and does not require further acute PT services. Patient mobility only appears limited by SOB due to COPD.    Recent Surgery: * No surgery found *      Plan:     During this hospitalization, patient does not require further acute PT services.  Please re-consult if situation changes.      Subjective     Chief Complaint: SOB  Patient/Family Comments/goals: go home  Pain/Comfort:  Pain Rating 1: 6/10  Location - Side 1: Left  Location - Orientation 1: lower  Location 1: arm  Pain Addressed 1: Reposition, Cessation of Activity  Pain Rating Post-Intervention 1: 8/10 (pain increased with UE movement)    Patients cultural, spiritual, Presybeterian conflicts given the current situation:      Living Environment:  Currently lives with spouse in a 1 story home.  Prior to admission, patients level of function was independent.  Equipment used at home: none.  DME owned (not currently used): none.  Upon discharge, patient will have assistance from family.    Objective:     Communicated with nurse prior to session.  Patient found sitting edge of bed with peripheral IV upon PT entry to room.    General Precautions: Standard, fall   Orthopedic Precautions:N/A   Braces:     Respiratory Status: Nasal cannula, flow 2 L/min    Exams:  RLE ROM: WFL  RLE Strength: WNL  LLE ROM: WFL  LLE Strength: WNL    Functional Mobility:  Bed Mobility:     Supine to Sit: independence  Transfers:     Sit to Stand:  independence with no AD  Gait: x 50 feet no aD independence    AM-PAC 6 CLICK MOBILITY  Total Score:24       Treatment and Education:   None  given    AM-PAC 6 CLICK MOBILITY  Total Score:24     Patient left sitting edge of bed with call button in reach and nurse notified.    GOALS:   Multidisciplinary Problems       Physical Therapy Goals       Not on file                    History:     Past Medical History:   Diagnosis Date    Asthma     CHF (congestive heart failure)     COPD (chronic obstructive pulmonary disease)     Hypertension     MI, old        Past Surgical History:   Procedure Laterality Date    LAPAROSCOPIC CHOLECYSTECTOMY N/A 4/11/2022    Procedure: CHOLECYSTECTOMY, LAPAROSCOPIC;  Surgeon: Neville Hutchinson MD;  Location: [facility];  Service: General;  Laterality: N/A;    TONSILLECTOMY         Time Tracking:     PT Received On: 11/09/22  PT Start Time: 0926     PT Stop Time: 0945  PT Total Time (min): 19 min     Billable Minutes: Evaluation 19 11/09/2022

## 2022-11-09 NOTE — HPI
[facility] ED, Symptoms started at 6:30 p.m. and are improving. He does have improving right upper and right lower extremity weakness and numbness. He is equal in the upper and lower extremities at this time. Symptoms started at 6:30 p.m. and are improving.      Current Facility-Administered Medications:     iohexoL (OMNIPAQUE 350) 350 mg iodine/mL injection, , , ,     Current Outpatient Medications:     ALBUTEROL INHL, Inhale into the lungs., Disp: , Rfl:     buPROPion (WELLBUTRIN XL) 150 MG TB24 tablet, Take 150 mg by mouth once daily., Disp: , Rfl:     gabapentin (NEURONTIN) 300 MG capsule, Take 300 mg by mouth 2 (two) times daily., Disp: , Rfl:

## 2022-11-09 NOTE — ASSESSMENT & PLAN NOTE
Body mass index is 43.87 kg/m². Morbid obesity complicates all aspects of disease management from diagnostic modalities to treatment. Weight loss encouraged and health benefits explained to patient.     Should have outpatient JESSY testing

## 2022-11-09 NOTE — ED NOTES
Latest Reference Range & Units Most Recent   POCT Glucose 70 - 110 mg/dL 106  11/8/22 20:37   Sample  unknown  11/8/22 20:42   POC PTWBT 9.7 - 14.3 sec 13.9  11/8/22 20:42   POC PTINR 0.9 - 1.2  1.2  11/8/22 20:42       Results reported to Dr. Da Silva

## 2022-11-09 NOTE — ASSESSMENT & PLAN NOTE
Supplemental O2 as needed  Will need evaluation for resuming home O2 at discharge as he no longer has his equipment

## 2022-11-10 PROBLEM — I50.32 CHRONIC HEART FAILURE WITH PRESERVED EJECTION FRACTION: Chronic | Status: ACTIVE | Noted: 2022-11-09

## 2022-11-10 PROBLEM — J96.11 CHRONIC RESPIRATORY FAILURE WITH HYPOXIA: Chronic | Status: ACTIVE | Noted: 2022-11-09

## 2022-11-10 PROBLEM — E66.01 SEVERE OBESITY (BMI >= 40): Chronic | Status: ACTIVE | Noted: 2022-11-09

## 2022-11-10 PROBLEM — G45.9 TRANSIENT ISCHEMIC ATTACK: Status: ACTIVE | Noted: 2022-11-10

## 2022-11-10 PROBLEM — I10 ESSENTIAL HYPERTENSION: Chronic | Status: ACTIVE | Noted: 2019-10-08

## 2022-11-10 PROBLEM — E78.2 MIXED HYPERLIPIDEMIA: Chronic | Status: ACTIVE | Noted: 2022-11-09

## 2022-11-10 LAB
ALBUMIN SERPL BCP-MCNC: 3.6 G/DL (ref 3.5–5.2)
ALP SERPL-CCNC: 86 U/L (ref 55–135)
ALT SERPL W/O P-5'-P-CCNC: 19 U/L (ref 10–44)
ANION GAP SERPL CALC-SCNC: 8 MMOL/L (ref 8–16)
AST SERPL-CCNC: 14 U/L (ref 10–40)
BASOPHILS # BLD AUTO: 0.03 K/UL (ref 0–0.2)
BASOPHILS NFR BLD: 0.4 % (ref 0–1.9)
BILIRUB SERPL-MCNC: 0.6 MG/DL (ref 0.1–1)
BUN SERPL-MCNC: 31 MG/DL (ref 6–20)
CALCIUM SERPL-MCNC: 8.9 MG/DL (ref 8.7–10.5)
CHLORIDE SERPL-SCNC: 101 MMOL/L (ref 95–110)
CO2 SERPL-SCNC: 31 MMOL/L (ref 23–29)
CREAT SERPL-MCNC: 1.1 MG/DL (ref 0.5–1.4)
DIFFERENTIAL METHOD: ABNORMAL
EOSINOPHIL # BLD AUTO: 0 K/UL (ref 0–0.5)
EOSINOPHIL NFR BLD: 0 % (ref 0–8)
ERYTHROCYTE [DISTWIDTH] IN BLOOD BY AUTOMATED COUNT: 14.5 % (ref 11.5–14.5)
EST. GFR  (NO RACE VARIABLE): >60 ML/MIN/1.73 M^2
GLUCOSE SERPL-MCNC: 92 MG/DL (ref 70–110)
HCT VFR BLD AUTO: 47.6 % (ref 40–54)
HGB BLD-MCNC: 15.1 G/DL (ref 14–18)
IMM GRANULOCYTES # BLD AUTO: 0.03 K/UL (ref 0–0.04)
IMM GRANULOCYTES NFR BLD AUTO: 0.4 % (ref 0–0.5)
LYMPHOCYTES # BLD AUTO: 1.8 K/UL (ref 1–4.8)
LYMPHOCYTES NFR BLD: 22 % (ref 18–48)
MCH RBC QN AUTO: 31.7 PG (ref 27–31)
MCHC RBC AUTO-ENTMCNC: 31.7 G/DL (ref 32–36)
MCV RBC AUTO: 100 FL (ref 82–98)
MONOCYTES # BLD AUTO: 0.8 K/UL (ref 0.3–1)
MONOCYTES NFR BLD: 10 % (ref 4–15)
NEUTROPHILS # BLD AUTO: 5.5 K/UL (ref 1.8–7.7)
NEUTROPHILS NFR BLD: 67.2 % (ref 38–73)
NRBC BLD-RTO: 0 /100 WBC
PLATELET # BLD AUTO: 194 K/UL (ref 150–450)
PMV BLD AUTO: 10.9 FL (ref 9.2–12.9)
POCT GLUCOSE: 111 MG/DL (ref 70–110)
POCT GLUCOSE: 89 MG/DL (ref 70–110)
POTASSIUM SERPL-SCNC: 5 MMOL/L (ref 3.5–5.1)
PROT SERPL-MCNC: 6.9 G/DL (ref 6–8.4)
RBC # BLD AUTO: 4.77 M/UL (ref 4.6–6.2)
SODIUM SERPL-SCNC: 140 MMOL/L (ref 136–145)
WBC # BLD AUTO: 8.12 K/UL (ref 3.9–12.7)

## 2022-11-10 PROCEDURE — 27000221 HC OXYGEN, UP TO 24 HOURS

## 2022-11-10 PROCEDURE — 25000003 PHARM REV CODE 250: Performed by: NURSE PRACTITIONER

## 2022-11-10 PROCEDURE — 99222 PR INITIAL HOSPITAL CARE,LEVL II: ICD-10-PCS | Mod: ,,, | Performed by: INTERNAL MEDICINE

## 2022-11-10 PROCEDURE — 80053 COMPREHEN METABOLIC PANEL: CPT | Performed by: NURSE PRACTITIONER

## 2022-11-10 PROCEDURE — G0378 HOSPITAL OBSERVATION PER HR: HCPCS

## 2022-11-10 PROCEDURE — 94761 N-INVAS EAR/PLS OXIMETRY MLT: CPT

## 2022-11-10 PROCEDURE — 94640 AIRWAY INHALATION TREATMENT: CPT | Mod: XB

## 2022-11-10 PROCEDURE — 36415 COLL VENOUS BLD VENIPUNCTURE: CPT | Performed by: NURSE PRACTITIONER

## 2022-11-10 PROCEDURE — S4991 NICOTINE PATCH NONLEGEND: HCPCS | Performed by: NURSE PRACTITIONER

## 2022-11-10 PROCEDURE — 25000242 PHARM REV CODE 250 ALT 637 W/ HCPCS: Performed by: NURSE PRACTITIONER

## 2022-11-10 PROCEDURE — 99222 1ST HOSP IP/OBS MODERATE 55: CPT | Mod: ,,, | Performed by: INTERNAL MEDICINE

## 2022-11-10 PROCEDURE — 85025 COMPLETE CBC W/AUTO DIFF WBC: CPT | Performed by: NURSE PRACTITIONER

## 2022-11-10 RX ORDER — ATORVASTATIN CALCIUM 40 MG/1
80 TABLET, FILM COATED ORAL DAILY
Status: DISCONTINUED | OUTPATIENT
Start: 2022-11-10 | End: 2022-11-11 | Stop reason: HOSPADM

## 2022-11-10 RX ADMIN — ATORVASTATIN CALCIUM 80 MG: 40 TABLET, FILM COATED ORAL at 09:11

## 2022-11-10 RX ADMIN — ASPIRIN 81 MG: 81 TABLET, COATED ORAL at 09:11

## 2022-11-10 RX ADMIN — GABAPENTIN 300 MG: 300 CAPSULE ORAL at 09:11

## 2022-11-10 RX ADMIN — SENNOSIDES AND DOCUSATE SODIUM 1 TABLET: 50; 8.6 TABLET ORAL at 08:11

## 2022-11-10 RX ADMIN — CLOPIDOGREL BISULFATE 75 MG: 75 TABLET ORAL at 08:11

## 2022-11-10 RX ADMIN — SERTRALINE HYDROCHLORIDE 50 MG: 50 TABLET ORAL at 08:11

## 2022-11-10 RX ADMIN — IPRATROPIUM BROMIDE AND ALBUTEROL SULFATE 3 ML: 2.5; .5 SOLUTION RESPIRATORY (INHALATION) at 11:11

## 2022-11-10 RX ADMIN — PANTOPRAZOLE SODIUM 40 MG: 40 TABLET, DELAYED RELEASE ORAL at 09:11

## 2022-11-10 RX ADMIN — MONTELUKAST 10 MG: 10 TABLET, FILM COATED ORAL at 08:11

## 2022-11-10 RX ADMIN — IPRATROPIUM BROMIDE AND ALBUTEROL SULFATE 3 ML: 2.5; .5 SOLUTION RESPIRATORY (INHALATION) at 07:11

## 2022-11-10 RX ADMIN — BUPROPION HYDROCHLORIDE 150 MG: 150 TABLET, FILM COATED, EXTENDED RELEASE ORAL at 08:11

## 2022-11-10 RX ADMIN — Medication 1 PATCH: at 09:11

## 2022-11-10 RX ADMIN — IPRATROPIUM BROMIDE AND ALBUTEROL SULFATE 3 ML: 2.5; .5 SOLUTION RESPIRATORY (INHALATION) at 03:11

## 2022-11-10 RX ADMIN — GABAPENTIN 300 MG: 300 CAPSULE ORAL at 08:11

## 2022-11-10 NOTE — PLAN OF CARE
Plan of care reviewed with pt at beginning of shift.  Pt verbalized understanding. Purposeful rounds completed throughout shift.  Pain monitored (medicated when/tolerated), restroom offered, repositioning encouraged , safety maintained.  Patient has remained free from fall/injury, no skin breakdown noted.  Side rails up x2, bed in locked and lowest position, call light kept within reach.       Problem: Adjustment to Illness (Stroke, Ischemic/Transient Ischemic Attack)  Goal: Optimal Coping  Outcome: Ongoing, Progressing     Problem: Cerebral Tissue Perfusion (Stroke, Ischemic/Transient Ischemic Attack)  Goal: Optimal Cerebral Tissue Perfusion  Outcome: Ongoing, Progressing     Problem: Bowel Elimination Impaired (Stroke, Ischemic/Transient Ischemic Attack)  Goal: Effective Bowel Elimination  Outcome: Ongoing, Progressing     Problem: Cognitive Impairment (Stroke, Ischemic/Transient Ischemic Attack)  Goal: Optimal Cognitive Function  Outcome: Ongoing, Progressing     Problem: Communication Impairment (Stroke, Ischemic/Transient Ischemic Attack)  Goal: Improved Communication Skills  Outcome: Ongoing, Progressing     Problem: Functional Ability Impaired (Stroke, Ischemic/Transient Ischemic Attack)  Goal: Optimal Functional Ability  Outcome: Ongoing, Progressing     Problem: Respiratory Compromise (Stroke, Ischemic/Transient Ischemic Attack)  Goal: Effective Oxygenation and Ventilation  Outcome: Ongoing, Progressing     Problem: Sensorimotor Impairment (Stroke, Ischemic/Transient Ischemic Attack)  Goal: Improved Sensorimotor Function  Outcome: Ongoing, Progressing     Problem: Swallowing Impairment (Stroke, Ischemic/Transient Ischemic Attack)  Goal: Optimal Eating and Swallowing without Aspiration  Outcome: Ongoing, Progressing     Problem: Urinary Elimination Impaired (Stroke, Ischemic/Transient Ischemic Attack)  Goal: Effective Urinary Elimination  Outcome: Ongoing, Progressing     Problem: Fall Injury Risk  Goal:  Absence of Fall and Fall-Related Injury  Outcome: Ongoing, Progressing     Problem: Adult Inpatient Plan of Care  Goal: Plan of Care Review  Outcome: Ongoing, Progressing  Goal: Patient-Specific Goal (Individualized)  Outcome: Ongoing, Progressing  Goal: Absence of Hospital-Acquired Illness or Injury  Outcome: Ongoing, Progressing  Goal: Optimal Comfort and Wellbeing  Outcome: Ongoing, Progressing  Goal: Readiness for Transition of Care  Outcome: Ongoing, Progressing     Problem: Bariatric Environmental Safety  Goal: Safety Maintained with Care  Outcome: Ongoing, Progressing     Problem: Infection  Goal: Absence of Infection Signs and Symptoms  Outcome: Ongoing, Progressing     Problem: Fluid Imbalance (Pneumonia)  Goal: Fluid Balance  Outcome: Ongoing, Progressing     Problem: Oral Intake Inadequate (Acute Kidney Injury/Impairment)  Goal: Optimal Nutrition Intake  Outcome: Ongoing, Progressing

## 2022-11-10 NOTE — NURSING
Assumed care of pt, report from Kizzy. Sitting up in bed. NAD. Bed locked and low, call light in reach.

## 2022-11-10 NOTE — ASSESSMENT & PLAN NOTE
Patient with acute kidney injury likely due to pre-renal azotemia SHAKIR is currently trending. Labs reviewed- Renal function/electrolytes with Estimated Creatinine Clearance: 102 mL/min (based on SCr of 1.1 mg/dL). according to latest data. Monitor urine output and serial BMP and adjust therapy as needed. Avoid nephrotoxins and renally dose meds for GFR listed above.     Given contrast for CTA  IV hydration 1L at 125mL/hr given HFpEF 67% (echo 2016)  Hold ACEI/ARB/NSAID/Coxib  Daily chemistries    Resolved.

## 2022-11-10 NOTE — PLAN OF CARE
POC reviewed with pt, pt verbalizes understanding. AAOx4. Ambulatory with standby assist. Very mild weakness as described by pt. On tele. Neuro checks wdl. Sitting up in bed. Family at bedside. VSS. Bed locked and low, call light in reach.

## 2022-11-10 NOTE — PLAN OF CARE
11/10/22 1405   Final Note   Assessment Type Final Discharge Note   Anticipated Discharge Disposition Short Term
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
## 2022-11-10 NOTE — ASSESSMENT & PLAN NOTE
Hpi Title: Evaluation of Skin Lesions Patient's COPD is controlled currently.  Patient is currently off COPD Pathway. Continue scheduled inhalers Supplemental oxygen and monitor respiratory status closely.   Will continue home Advair  Home oxygen eval       How Severe Are Your Spot(S)?: mild Have Your Spot(S) Been Treated In The Past?: has not been treated

## 2022-11-10 NOTE — ASSESSMENT & PLAN NOTE
Consult neurology  CUS obtained  Recommendations for Vascular Surgery consultation - PFC transfer initiated to [facility] 11/10/2022

## 2022-11-10 NOTE — ASSESSMENT & PLAN NOTE
"Admit to PCU  Consulted telestroke in ED appreciate recs - "As CTA -ve for intracranial occlusion, recommend loading Plavix load 300 mg today per POINT/CHANCE protocol today and from tomorrow 81 mg aspirin and plavix 75 mg for 21 days followed by mono antiplatelet."  MRI brain in AM - reviewed - no acute stroke  Echo with bubble - PFO noted - Cardiology consult for NIVIA  Continue appropriate home meds  Consult PT/OT/SLP  Permissive HTN for 24 hours with prn labetolol for BP over 220/110  ASA/Statin/plavix - loaded with plavix as per telestroke rec's - continuing  NIH/Neuro check per protocol  Fall precautions  Consult neurology - appreciate recommendations   On pathway  CUS reviewed - recommendations for Vascular Surgery consult - PFC transfer initiated.   " OSDistance

## 2022-11-10 NOTE — CARE UPDATE
11/10/22 0701   Patient Assessment/Suction   Level of Consciousness (AVPU) alert   All Lung Fields Breath Sounds diminished   Cough Frequency infrequent   Cough Type good;nonproductive   PRE-TX-O2   O2 Device (Oxygen Therapy) nasal cannula   $ Is the patient on Low Flow Oxygen? Yes   Flow (L/min) 2   SpO2 96 %   Pulse Oximetry Type Intermittent   $ Pulse Oximetry - Multiple Charge Pulse Oximetry - Multiple   Pulse 78   Resp 20   Aerosol Therapy   $ Aerosol Therapy Charges Aerosol Treatment   Respiratory Treatment Status (SVN) given   Treatment Route (SVN) mask   Patient Position (SVN) sitting on edge of bed   Post Treatment Assessment (SVN) breath sounds unchanged   Signs of Intolerance (SVN) none   Breath Sounds Post-Respiratory Treatment   Post-treatment Heart Rate (beats/min) 84   Post-treatment Resp Rate (breaths/min) 20

## 2022-11-10 NOTE — SUBJECTIVE & OBJECTIVE
Interval History:  Patient seen and examined.  No acute events since admission.  SHAKIR resolved.  Discussed CUS and TTE findings with patient and Neurology.  Recommendations for transfer to higher level of care for Vascular Surgery. PFC transfer initiated.     Review of Systems   Constitutional:  Negative for appetite change, chills and fever.   Respiratory:  Negative for cough, shortness of breath and wheezing.    Cardiovascular:  Negative for chest pain, palpitations and leg swelling.   Gastrointestinal:  Negative for abdominal pain, diarrhea, nausea and vomiting.   Genitourinary:  Negative for difficulty urinating, dysuria, hematuria and urgency.   Neurological:  Positive for facial asymmetry, speech difficulty, weakness and numbness. Negative for dizziness and syncope.   Psychiatric/Behavioral:  Negative for confusion. The patient is not nervous/anxious.    Objective:     Vital Signs (Most Recent):  Temp: 97.5 °F (36.4 °C) (11/10/22 0800)  Pulse: 68 (11/10/22 1101)  Resp: 20 (11/10/22 1101)  BP: (!) 148/86 (11/10/22 0800)  SpO2: 95 % (11/10/22 1101) Vital Signs (24h Range):  Temp:  [97.5 °F (36.4 °C)-98.4 °F (36.9 °C)] 97.5 °F (36.4 °C)  Pulse:  [64-86] 68  Resp:  [16-20] 20  SpO2:  [95 %-98 %] 95 %  BP: (126-170)/(68-87) 148/86     Weight: (!) 140.2 kg (309 lb)  Body mass index is 45.63 kg/m².    Intake/Output Summary (Last 24 hours) at 11/10/2022 1139  Last data filed at 11/10/2022 0623  Gross per 24 hour   Intake 720 ml   Output 2100 ml   Net -1380 ml        Physical Exam  Constitutional:       General: He is not in acute distress.     Appearance: He is obese. He is not ill-appearing, toxic-appearing or diaphoretic.   Cardiovascular:      Rate and Rhythm: Normal rate and regular rhythm.      Pulses: Normal pulses.      Heart sounds: Normal heart sounds.   Pulmonary:      Effort: Pulmonary effort is normal. No respiratory distress.      Breath sounds: Normal breath sounds. No wheezing.   Abdominal:      General:  Bowel sounds are normal. There is no distension.      Palpations: Abdomen is soft.      Tenderness: There is no abdominal tenderness. There is no guarding.   Musculoskeletal:      Right lower leg: No edema.      Left lower leg: No edema.   Neurological:      Mental Status: He is alert and oriented to person, place, and time. Mental status is at baseline.      Motor: No weakness.      Coordination: Coordination normal.       Significant Labs: All pertinent labs within the past 24 hours have been reviewed.  CBC:   Recent Labs   Lab 11/08/22  2040 11/09/22  0435 11/10/22  0440   WBC 12.28 10.84 8.12   HGB 16.6 15.1 15.1   HCT 51.4 48.0 47.6    219 194       CMP:   Recent Labs   Lab 11/08/22  2040 11/09/22  0434 11/10/22  0440    138 140   K 4.7 4.8 5.0    103 101   CO2 21* 25 31*    89 92   BUN 40* 52* 31*   CREATININE 2.2* 1.8* 1.1   CALCIUM 9.9 8.9 8.9   PROT 7.9 6.5 6.9   ALBUMIN 4.0 3.4* 3.6   BILITOT 0.5 0.3 0.6   ALKPHOS 84 94 86   AST 17 14 14   ALT 23 18 19   ANIONGAP 16 10 8       Urine Studies:   Recent Labs   Lab 11/09/22  0640   COLORU Yellow   APPEARANCEUA Clear   PHUR 6.0   SPECGRAV 1.015   PROTEINUA Negative   GLUCUA Negative   KETONESU Negative   BILIRUBINUA Negative   OCCULTUA Trace*   NITRITE Negative   UROBILINOGEN Negative   LEUKOCYTESUR Negative         Significant Imaging: I have reviewed all pertinent imaging results/findings within the past 24 hours.  US Carotid Bilateral [[id number]] Resulted: 11/09/22 1604   Order Status: Completed Updated: 11/09/22 1607   Narrative:     EXAMINATION:   US CAROTID BILATERAL     CLINICAL HISTORY:   stroke, rule out unstable plaque;     TECHNIQUE:   Grayscale and color Doppler ultrasound examination of the carotid and vertebral artery systems bilaterally.  Stenosis estimates are per the NASCET measurement criteria.     COMPARISON:   None.     FINDINGS:   Right:     Internal Carotid Artery (ICA) peak systolic velocity 161 cm/sec      ICA/CCA peak systolic velocity ratio: 1.0     Plaque formation: Heavy calcified     Vertebral artery: Antegrade flow with normal waveform.     Left:     Internal Carotid Artery (ICA)  peak systolic velocity 267 cm/sec     ICA/CCA peak systolic velocity ratio: 1.8     Plaque formation: Heavy calcified     Vertebral artery: Antegrade flow with somewhat dampened waveform.    Impression:       Sonographic findings compatible with 50-69% stenosis of the right ICA and greater than 70% stenosis of the left ICA.       Electronically signed by: Vincent Willett MD   Date: 11/09/2022   Time: 16:04   MRI BRAIN WITHOUT CONTRAST [[id number]] Resulted: 11/09/22 1436   Order Status: Completed Updated: 11/09/22 1439   Narrative:     EXAM:   MRI BRAIN WITHOUT CONTRAST     CLINICAL HISTORY:   Stroke, follow up; .     COMPARISON:   CTA head and neck dated 11/08/2022     FINDINGS:   Intracranial contents:The study is motion degraded.  There is no acute or significant abnormality.  Specifically, there are no regions of restricted diffusion to suggest acute infarction.  There is no intracranial hemorrhage.  There is no mass or mass effect.  Brain volume, ventricular size and position are normal.  There is only minimal nonspecific white matter change.  There is no abnormal extra-axial fluid collection.  The basilar cisterns are open.  Flow voids indicating patency are present in the major vessels at the base of the brain.  The cerebellar tonsils are just at the level of the foramen magnum.  Sellar structures are normal.  The orbits are grossly normal.     Extracranial contents, calvarium, soft tissues:Baseline marrow signal is normal.  There is partial opacification of bilateral mastoid air cells, likely representing bilateral, left greater than right, mastoid effusions.  There is only trace scattered mucosal thickening in the ethmoid air cells.  The nasal septum is deviated to the left anteriorly.    Impression:       1. There is no  acute or significant intracranial abnormality.  There is only mild volume loss.  There is no hemorrhage, mass, mass effect or acute infarction.   2. Left greater than right mastoid effusions.       Electronically signed by: Twan Fry MD   Date: 11/09/2022   Time: 14:36   CTA Head and Neck (xpd) [[id number]] Resulted: 11/08/22 2122   Order Status: Completed Updated: 11/08/22 2124   Narrative:     EXAMINATION:   CTA HEAD AND NECK (XPD)     CLINICAL HISTORY:   Neuro deficit, acute, stroke suspected;Stroke/TIA, determine embolic source;     TECHNIQUE:   Non contrast low dose axial images were obtained through the head. CT angiogram was performed from the level of the avery to the top of the head following the IV administration of 100mL of Omnipaque 350.   Sagittal and coronal reconstructions and maximum intensity projection reconstructions were performed. Arterial stenosis percentages are based on NASCET measurement criteria.     COMPARISON:   None     FINDINGS:   Intracranial Compartment:     Ventricles and sulci are normal in size for age without evidence of hydrocephalus. No extra-axial blood or fluid collections.     The brain parenchyma appears normal. No parenchymal mass, hemorrhage, edema, or major vascular distribution infarct.     Skull/Extracranial Contents (limited evaluation): No bony calvarial fracture.  Left mastoid air cells are opacified.  Right mastoid air cells and paranasal sinuses are essentially clear.     Non-Vascular Structures of the Neck/Thoracic Inlet (limited evaluation): Normal.     Aorta: Normal 3 vessel arch.     Extracranial carotid circulation: Right carotid at the bifurcation demonstrates significant stenosis on the order of 70 % at the bifurcation and 80% in the proximal ICA.  On the left there is heavy atherosclerotic calcification at the bifurcation and 50% stenosis in the proximal ICA.  No Aneurysmal dilatation, or dissection.     Extracranial vertebral circulation: There is  multifocal left vertebral artery significant stenosis.  Right vertebral artery is patent and dominant.  There is no aneurysmal dilatation, or dissection.     Intracranial Arteries: No focal high-grade stenosis, occlusion, or aneurysm.     Venous structures (limited evaluation): Normal.    Impression:       No acute intracranial abnormality.     Significant stenosis involving the right carotid bifurcation and proximal ICA.     50% stenosis of the proximal ICA on the left.     Multifocal significant stenosis of the left vertebral artery.       Electronically signed by: Yuliya Cano   Date: 11/08/2022   Time: 21:22
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
## 2022-11-10 NOTE — ASSESSMENT & PLAN NOTE
Patient is identified as having Diastolic (HFpEF) heart failure that is Chronic. CHF is currently controlled. Latest ECHO performed and demonstrates- Results for orders placed during the hospital encounter of 11/08/22    Echo Saline Bubble? Yes    Interpretation Summary  · The left ventricle is normal in size with concentric remodeling and normal systolic function.  · Normal left ventricular diastolic function.  · The estimated PA systolic pressure is 45 mmHg.  · Normal right ventricular size with normal right ventricular systolic function.  · There is mild pulmonary hypertension.  · Normal central venous pressure (3 mmHg).  · Study is positive for intercardiac shunt that is right to left.  · Moderate left atrial enlargement.  · The estimated ejection fraction is 60%.  · There is mild aortic valve stenosis.  · Aortic valve area is 3.66 cm2; peak velocity is 2.14 m/s; mean gradient is 11 mmHg.  · Mild right atrial enlargement.  · There is a patent foramen ovale present with right to left shunting indicated by saline contrast.  . Continue ACE/ARB and monitor clinical status closely. Monitor on telemetry. Patient is off CHF pathway.  Monitor strict Is&Os and daily weights.

## 2022-11-10 NOTE — CARE UPDATE
11/09/22 1937   Patient Assessment/Suction   Level of Consciousness (AVPU) alert   Respiratory Effort Normal;Unlabored   Expansion/Accessory Muscles/Retractions no retractions;no use of accessory muscles   All Lung Fields Breath Sounds diminished   Rhythm/Pattern, Respiratory depth regular;pattern regular   Cough Frequency no cough   PRE-TX-O2   O2 Device (Oxygen Therapy) nasal cannula   $ Is the patient on Low Flow Oxygen? Yes   Flow (L/min) 2   SpO2 98 %   Pulse Oximetry Type Intermittent   Pulse 64   Resp 20   Aerosol Therapy   $ Aerosol Therapy Charges Aerosol Treatment   Daily Review of Necessity (SVN) completed   Respiratory Treatment Status (SVN) given   Treatment Route (SVN) mask   Patient Position (SVN) HOB elevated   Post Treatment Assessment (SVN) breath sounds unchanged   Signs of Intolerance (SVN) none   Breath Sounds Post-Respiratory Treatment   Throughout All Fields Post-Treatment All Fields   Throughout All Fields Post-Treatment no change   Post-treatment Heart Rate (beats/min) 70   Post-treatment Resp Rate (breaths/min) 18
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
## 2022-11-11 ENCOUNTER — HOSPITAL ENCOUNTER (OUTPATIENT)
Facility: HOSPITAL | Age: 57
Discharge: HOME | DRG: 068 | End: 2022-11-11
Attending: STUDENT IN AN ORGANIZED HEALTH CARE EDUCATION/TRAINING PROGRAM | Admitting: STUDENT IN AN ORGANIZED HEALTH CARE EDUCATION/TRAINING PROGRAM
Payer: MEDICAID

## 2022-11-11 VITALS
BODY MASS INDEX: 44.73 KG/M2 | OXYGEN SATURATION: 93 % | RESPIRATION RATE: 16 BRPM | HEART RATE: 80 BPM | WEIGHT: 302 LBS | HEIGHT: 69 IN | DIASTOLIC BLOOD PRESSURE: 80 MMHG | TEMPERATURE: 99 F | SYSTOLIC BLOOD PRESSURE: 138 MMHG

## 2022-11-11 VITALS
SYSTOLIC BLOOD PRESSURE: 168 MMHG | RESPIRATION RATE: 19 BRPM | DIASTOLIC BLOOD PRESSURE: 87 MMHG | BODY MASS INDEX: 44.24 KG/M2 | OXYGEN SATURATION: 93 % | HEIGHT: 70 IN | TEMPERATURE: 98 F | HEART RATE: 71 BPM | WEIGHT: 309 LBS

## 2022-11-11 DIAGNOSIS — J96.11 CHRONIC RESPIRATORY FAILURE WITH HYPOXIA: Chronic | ICD-10-CM

## 2022-11-11 DIAGNOSIS — I65.22 SYMPTOMATIC CAROTID ARTERY STENOSIS, LEFT: ICD-10-CM

## 2022-11-11 DIAGNOSIS — Z72.0 TOBACCO ABUSE: Chronic | ICD-10-CM

## 2022-11-11 DIAGNOSIS — I65.23 BILATERAL CAROTID ARTERY STENOSIS: ICD-10-CM

## 2022-11-11 DIAGNOSIS — G45.9 TRANSIENT ISCHEMIC ATTACK: Primary | ICD-10-CM

## 2022-11-11 DIAGNOSIS — E78.2 MIXED HYPERLIPIDEMIA: Chronic | ICD-10-CM

## 2022-11-11 DIAGNOSIS — G45.9 TIA (TRANSIENT ISCHEMIC ATTACK): ICD-10-CM

## 2022-11-11 PROBLEM — F12.90 MARIJUANA USE: Status: ACTIVE | Noted: 2022-11-11

## 2022-11-11 PROBLEM — F12.90 MARIJUANA USE: Chronic | Status: ACTIVE | Noted: 2022-11-11

## 2022-11-11 LAB
ALBUMIN SERPL BCP-MCNC: 3.8 G/DL (ref 3.5–5.2)
ALP SERPL-CCNC: 79 U/L (ref 55–135)
ALT SERPL W/O P-5'-P-CCNC: 17 U/L (ref 10–44)
ANION GAP SERPL CALC-SCNC: 10 MMOL/L (ref 8–16)
APTT BLDCRRT: 25.2 SEC (ref 21–32)
AST SERPL-CCNC: 17 U/L (ref 10–40)
BASOPHILS # BLD AUTO: 0.02 K/UL (ref 0–0.2)
BASOPHILS NFR BLD: 0.2 % (ref 0–1.9)
BILIRUB SERPL-MCNC: 1.5 MG/DL (ref 0.1–1)
BUN SERPL-MCNC: 25 MG/DL (ref 6–20)
CALCIUM SERPL-MCNC: 9.3 MG/DL (ref 8.7–10.5)
CHLORIDE SERPL-SCNC: 99 MMOL/L (ref 95–110)
CO2 SERPL-SCNC: 29 MMOL/L (ref 23–29)
CREAT SERPL-MCNC: 0.8 MG/DL (ref 0.5–1.4)
DIFFERENTIAL METHOD: ABNORMAL
EOSINOPHIL # BLD AUTO: 0 K/UL (ref 0–0.5)
EOSINOPHIL NFR BLD: 0 % (ref 0–8)
ERYTHROCYTE [DISTWIDTH] IN BLOOD BY AUTOMATED COUNT: 14.5 % (ref 11.5–14.5)
EST. GFR  (NO RACE VARIABLE): >60 ML/MIN/1.73 M^2
GLUCOSE SERPL-MCNC: 87 MG/DL (ref 70–110)
HCT VFR BLD AUTO: 48 % (ref 40–54)
HGB BLD-MCNC: 15.7 G/DL (ref 14–18)
IMM GRANULOCYTES # BLD AUTO: 0.02 K/UL (ref 0–0.04)
IMM GRANULOCYTES NFR BLD AUTO: 0.2 % (ref 0–0.5)
INR PPP: 1 (ref 0.8–1.2)
LYMPHOCYTES # BLD AUTO: 1.5 K/UL (ref 1–4.8)
LYMPHOCYTES NFR BLD: 18.4 % (ref 18–48)
MAGNESIUM SERPL-MCNC: 1.8 MG/DL (ref 1.6–2.6)
MCH RBC QN AUTO: 32.2 PG (ref 27–31)
MCHC RBC AUTO-ENTMCNC: 32.7 G/DL (ref 32–36)
MCV RBC AUTO: 99 FL (ref 82–98)
MONOCYTES # BLD AUTO: 0.8 K/UL (ref 0.3–1)
MONOCYTES NFR BLD: 9.6 % (ref 4–15)
NEUTROPHILS # BLD AUTO: 6 K/UL (ref 1.8–7.7)
NEUTROPHILS NFR BLD: 71.6 % (ref 38–73)
NRBC BLD-RTO: 0 /100 WBC
PHOSPHATE SERPL-MCNC: 3.3 MG/DL (ref 2.7–4.5)
PLATELET # BLD AUTO: 190 K/UL (ref 150–450)
PMV BLD AUTO: 10.8 FL (ref 9.2–12.9)
POTASSIUM SERPL-SCNC: 4.5 MMOL/L (ref 3.5–5.1)
PROT SERPL-MCNC: 7.1 G/DL (ref 6–8.4)
PROTHROMBIN TIME: 10.6 SEC (ref 9–12.5)
RBC # BLD AUTO: 4.87 M/UL (ref 4.6–6.2)
SODIUM SERPL-SCNC: 138 MMOL/L (ref 136–145)
WBC # BLD AUTO: 8.32 K/UL (ref 3.9–12.7)

## 2022-11-11 PROCEDURE — 84100 ASSAY OF PHOSPHORUS: CPT | Performed by: STUDENT IN AN ORGANIZED HEALTH CARE EDUCATION/TRAINING PROGRAM

## 2022-11-11 PROCEDURE — 99223 PR INITIAL HOSPITAL CARE,LEVL III: ICD-10-PCS | Mod: ,,, | Performed by: STUDENT IN AN ORGANIZED HEALTH CARE EDUCATION/TRAINING PROGRAM

## 2022-11-11 PROCEDURE — 11000001 HC ACUTE MED/SURG PRIVATE ROOM

## 2022-11-11 PROCEDURE — G0378 HOSPITAL OBSERVATION PER HR: HCPCS

## 2022-11-11 PROCEDURE — G0379 DIRECT REFER HOSPITAL OBSERV: HCPCS

## 2022-11-11 PROCEDURE — 85730 THROMBOPLASTIN TIME PARTIAL: CPT | Performed by: STUDENT IN AN ORGANIZED HEALTH CARE EDUCATION/TRAINING PROGRAM

## 2022-11-11 PROCEDURE — S4991 NICOTINE PATCH NONLEGEND: HCPCS | Performed by: STUDENT IN AN ORGANIZED HEALTH CARE EDUCATION/TRAINING PROGRAM

## 2022-11-11 PROCEDURE — 83735 ASSAY OF MAGNESIUM: CPT | Performed by: STUDENT IN AN ORGANIZED HEALTH CARE EDUCATION/TRAINING PROGRAM

## 2022-11-11 PROCEDURE — 25000242 PHARM REV CODE 250 ALT 637 W/ HCPCS: Performed by: STUDENT IN AN ORGANIZED HEALTH CARE EDUCATION/TRAINING PROGRAM

## 2022-11-11 PROCEDURE — 99223 1ST HOSP IP/OBS HIGH 75: CPT | Mod: ,,, | Performed by: STUDENT IN AN ORGANIZED HEALTH CARE EDUCATION/TRAINING PROGRAM

## 2022-11-11 PROCEDURE — 25000003 PHARM REV CODE 250: Performed by: STUDENT IN AN ORGANIZED HEALTH CARE EDUCATION/TRAINING PROGRAM

## 2022-11-11 PROCEDURE — 80053 COMPREHEN METABOLIC PANEL: CPT | Performed by: STUDENT IN AN ORGANIZED HEALTH CARE EDUCATION/TRAINING PROGRAM

## 2022-11-11 PROCEDURE — 85025 COMPLETE CBC W/AUTO DIFF WBC: CPT | Performed by: STUDENT IN AN ORGANIZED HEALTH CARE EDUCATION/TRAINING PROGRAM

## 2022-11-11 PROCEDURE — 85610 PROTHROMBIN TIME: CPT | Performed by: STUDENT IN AN ORGANIZED HEALTH CARE EDUCATION/TRAINING PROGRAM

## 2022-11-11 PROCEDURE — 36415 COLL VENOUS BLD VENIPUNCTURE: CPT | Performed by: STUDENT IN AN ORGANIZED HEALTH CARE EDUCATION/TRAINING PROGRAM

## 2022-11-11 RX ORDER — IBUPROFEN 200 MG
1 TABLET ORAL DAILY
Qty: 28 PATCH | Refills: 2 | Status: SHIPPED | OUTPATIENT
Start: 2022-11-12 | End: 2023-01-20

## 2022-11-11 RX ORDER — LISINOPRIL 5 MG/1
5 TABLET ORAL DAILY
Qty: 30 TABLET | Refills: 0 | Status: SHIPPED | OUTPATIENT
Start: 2022-11-11 | End: 2023-01-20

## 2022-11-11 RX ORDER — CLOPIDOGREL BISULFATE 75 MG/1
75 TABLET ORAL DAILY
Qty: 30 TABLET | Refills: 0 | Status: SHIPPED | OUTPATIENT
Start: 2022-11-12 | End: 2023-11-12

## 2022-11-11 RX ORDER — PANTOPRAZOLE SODIUM 20 MG/1
40 TABLET, DELAYED RELEASE ORAL DAILY
Status: DISCONTINUED | OUTPATIENT
Start: 2022-11-11 | End: 2022-11-11 | Stop reason: HOSPADM

## 2022-11-11 RX ORDER — TALC
6 POWDER (GRAM) TOPICAL NIGHTLY PRN
Status: DISCONTINUED | OUTPATIENT
Start: 2022-11-11 | End: 2022-11-11 | Stop reason: HOSPADM

## 2022-11-11 RX ORDER — MONTELUKAST SODIUM 10 MG/1
10 TABLET ORAL NIGHTLY
Status: DISCONTINUED | OUTPATIENT
Start: 2022-11-12 | End: 2022-11-11 | Stop reason: HOSPADM

## 2022-11-11 RX ORDER — IBUPROFEN 200 MG
1 TABLET ORAL DAILY
Status: DISCONTINUED | OUTPATIENT
Start: 2022-11-11 | End: 2022-11-11 | Stop reason: HOSPADM

## 2022-11-11 RX ORDER — ATORVASTATIN CALCIUM 80 MG/1
80 TABLET, FILM COATED ORAL DAILY
Qty: 30 TABLET | Refills: 3 | Status: SHIPPED | OUTPATIENT
Start: 2022-11-12 | End: 2023-11-12

## 2022-11-11 RX ORDER — NAPROXEN SODIUM 220 MG/1
81 TABLET, FILM COATED ORAL DAILY
Qty: 30 TABLET | Refills: 3 | Status: SHIPPED | OUTPATIENT
Start: 2022-11-12 | End: 2023-11-12

## 2022-11-11 RX ORDER — GABAPENTIN 300 MG/1
300 CAPSULE ORAL 2 TIMES DAILY
Status: DISCONTINUED | OUTPATIENT
Start: 2022-11-11 | End: 2022-11-11 | Stop reason: HOSPADM

## 2022-11-11 RX ORDER — AMOXICILLIN 250 MG
1 CAPSULE ORAL 2 TIMES DAILY
Status: DISCONTINUED | OUTPATIENT
Start: 2022-11-11 | End: 2022-11-11 | Stop reason: HOSPADM

## 2022-11-11 RX ORDER — ATORVASTATIN CALCIUM 40 MG/1
80 TABLET, FILM COATED ORAL DAILY
Status: DISCONTINUED | OUTPATIENT
Start: 2022-11-11 | End: 2022-11-11 | Stop reason: HOSPADM

## 2022-11-11 RX ORDER — SERTRALINE HYDROCHLORIDE 50 MG/1
50 TABLET, FILM COATED ORAL NIGHTLY
Status: DISCONTINUED | OUTPATIENT
Start: 2022-11-12 | End: 2022-11-11 | Stop reason: HOSPADM

## 2022-11-11 RX ORDER — NAPROXEN SODIUM 220 MG/1
81 TABLET, FILM COATED ORAL DAILY
Status: DISCONTINUED | OUTPATIENT
Start: 2022-11-11 | End: 2022-11-11 | Stop reason: HOSPADM

## 2022-11-11 RX ORDER — CLOPIDOGREL BISULFATE 75 MG/1
75 TABLET ORAL DAILY
Status: DISCONTINUED | OUTPATIENT
Start: 2022-11-11 | End: 2022-11-11 | Stop reason: HOSPADM

## 2022-11-11 RX ORDER — POLYETHYLENE GLYCOL 3350 17 G/17G
17 POWDER, FOR SOLUTION ORAL 2 TIMES DAILY PRN
Status: DISCONTINUED | OUTPATIENT
Start: 2022-11-11 | End: 2022-11-11 | Stop reason: HOSPADM

## 2022-11-11 RX ORDER — HYDRALAZINE HYDROCHLORIDE 25 MG/1
25 TABLET, FILM COATED ORAL EVERY 8 HOURS PRN
Status: DISCONTINUED | OUTPATIENT
Start: 2022-11-11 | End: 2022-11-11

## 2022-11-11 RX ORDER — FLUTICASONE FUROATE AND VILANTEROL 200; 25 UG/1; UG/1
1 POWDER RESPIRATORY (INHALATION) DAILY
Status: DISCONTINUED | OUTPATIENT
Start: 2022-11-11 | End: 2022-11-11 | Stop reason: HOSPADM

## 2022-11-11 RX ADMIN — CLOPIDOGREL BISULFATE 75 MG: 75 TABLET ORAL at 09:11

## 2022-11-11 RX ADMIN — FLUTICASONE FUROATE AND VILANTEROL TRIFENATATE 1 PUFF: 200; 25 POWDER RESPIRATORY (INHALATION) at 09:11

## 2022-11-11 RX ADMIN — SENNOSIDES AND DOCUSATE SODIUM 1 TABLET: 50; 8.6 TABLET ORAL at 09:11

## 2022-11-11 RX ADMIN — ATORVASTATIN CALCIUM 80 MG: 40 TABLET, FILM COATED ORAL at 09:11

## 2022-11-11 RX ADMIN — GABAPENTIN 300 MG: 300 CAPSULE ORAL at 09:11

## 2022-11-11 RX ADMIN — ASPIRIN 81 MG 81 MG: 81 TABLET ORAL at 09:11

## 2022-11-11 RX ADMIN — PANTOPRAZOLE SODIUM 40 MG: 20 TABLET, DELAYED RELEASE ORAL at 09:11

## 2022-11-11 RX ADMIN — Medication 1 PATCH: at 09:11

## 2022-11-11 NOTE — ASSESSMENT & PLAN NOTE
11/8 TTE with bubble showed EF 60%, mild aortic stenosis, mild right atrial enlargement, mild pulmonary hypertension, and patent foramen ovale.

## 2022-11-11 NOTE — NURSING
Pt is on the phone with wife, Estephanie, informed of pt going to [facility] to room 954.

## 2022-11-11 NOTE — PLAN OF CARE
Problem: Adult Inpatient Plan of Care  Goal: Plan of Care Review  Outcome: Ongoing, Progressing  Flowsheets (Taken 11/11/2022 0416)  Plan of Care Reviewed With: patient     Problem: Adjustment to Illness (Stroke, Ischemic/Transient Ischemic Attack)  Goal: Optimal Coping  Outcome: Ongoing, Progressing  Intervention: Support Psychosocial Response to Stroke  Flowsheets (Taken 11/11/2022 0417)  Supportive Measures:   verbalization of feelings encouraged   self-care encouraged  Family/Support System Care: support provided   POC reviewed with pt at the bedside.  Pt verbalized understanding of POC.  Stroke education reviewed, material at the bedside, pt verbalized understanding of education.  NPO.  Ambulatory.  Independent.  On 02 @ 1.5 per NC, SOB noted while up in bed. HOB elevated above 60 degrees.  Fall risks and prevention also reviewed with pt.  Safety maintained.  Instructed to call for assistance.  Bed locked.  Call light within reach.
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
## 2022-11-11 NOTE — ASSESSMENT & PLAN NOTE
56 yo M with BCAS who presented after TIA with right sided symptoms, L ICA stenosis > 70%    - Plan for L CEA 11/15  - Consent obtained  - Okay to DC and return as outpatient  - Return sooner if symptoms return

## 2022-11-11 NOTE — NURSING
Pt picked up by [place]. Notified Primitivo Fisher RN @ [facility] that pt is on the way.

## 2022-11-11 NOTE — ASSESSMENT & PLAN NOTE
57 year old man with HTN, HLD, CAD s/p MI, HFpEF, COPD, smoking, obesity who initially presented to [facility] 11/8 with TIA (RSW and numbness, aphasia). CTA showed no acute abnormality with 80% stenosis of R carotid bifurcation. MRI with no acute process. Carotid US showed >70% stenosis of L ICA and 50-69% stenosis of R ICA. Transferred to [facility] for Vascular Surgery consult. Loaded with ASA and Plavix before transfer, seen by Dr. Drummond via telestroke. On arrival NIHSS zero and patient reports being at baseline.    Currently NPO pending Vascular Surgery evaluation.    Antithrombotics for secondary stroke prevention: Antiplatelets: Aspirin: 81 mg daily  Clopidogrel: 75 mg daily    Statins for secondary stroke prevention and hyperlipidemia, if present:   Statins: Atorvastatin- 80 mg daily    Aggressive risk factor modification: HTN, Smoking, HLD, Diet, Exercise, Obesity, CAD     Rehab efforts: The patient has been evaluated by a stroke team provider and the therapy needs have been fully considered based off the presenting complaints and exam findings. The following therapy evaluations are needed: None -- at [facility] PT/OT recommended home without further therapy, SLP recommended regular diet thin liquids    Diagnostics ordered/pending: None     VTE prophylaxis: Mechanical prophylaxis: Place SCDs  None: Reason for No Pharmacological VTE Prophylaxis: hold pending Vascular Surgery evaluation    BP parameters: TIA: SBP <220 given carotid stenosis

## 2022-11-11 NOTE — HOSPITAL COURSE
Transferred to [facility] for vascular surgery evaluation of carotid artery stenosis. Pt's symptoms fully resolved on arrival to [facility] with an NIHSS 0. Seen by vascular surgery who plan to complete carotid endarterectomy as an outpatient on Tuesday 11/15. Pt to continue atorvastatin and DAPT on discharge. Blood pressure was at goal, so will decrease lisinopril to 5mg daily until his procedure; will likely need to increase back to previous dose after his procedure. Smoking cessation education provided and Pt prescribed nicotine patches on discharge; will also be referred to smoking cessation program. Pt to follow-up with his PCP, vascular neurology, and vascular surgery on discharge. No home therapies are needed and Pt is able to discharge home.
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
## 2022-11-11 NOTE — DISCHARGE SUMMARY
Shaun Scott - Neurosurgery ([facility])  Vascular Neurology  Comprehensive Stroke Center  Discharge Summary     Summary:     Admit Date: 11/11/2022  3:02 AM    Discharge Date and Time:  11/11/2022 11:24 AM    Attending Physician: Tika Saenz MD     Discharge Provider: Yulissa Sanchez MD    History of Present Illness: Name is a 57 year old man with history of HTN, HLD, HFpEF, CAD s/p MI, COPD (previously on home oxygen), smoking, obesity who initially presented to [facility] 11/8, [place] [facility] [place] Vascular Surgery evaluation. On 11/8 at 1830 began having right arm numbness and weakness with aphasia, progressed to numbness and weakness of right leg as well. Symptoms improving by time of ED evaluation around 2030 that day. Initial /87. Evaluated via telestroke by Dr. Drummond, NIHSS 2. CTA negative for LVO, showed significant stenosis of R carotid bifurcation/proximal ICA and 50% stenosis of proximal L ICA. Did not receive thrombolytic as symptoms were improving. Loaded with  mg and Plavix 300 mg then continued on DAPT. MRI Brain showed no acute abnormality. Carotid US showed 50-69% stenosis of R ICA and >70% stenosis of L ICA.     While at [facility], evaluated by PT/OT with recommendation for home without further therapy and approved for regular diet with thin liquids by SLP.    On evaluation, patient reports that his symptoms on 11/8 resolved after about 2 hours. Currently feels he is at baseline. NIHSS zero. Last PO intake prior to transfer was around 1700 11/10. Lives with his wife.      Hospital Course (synopsis of major diagnoses, care, treatment, and services provided during the course of the hospital stay): Transfer[place] [facility] [place] of carotid artery stenosis. Pt's symptoms fully resolved on arrival to [facility] with an NIHSS 0. Seen by vascular surgery who plan to complete carotid endarterectomy as an outpatient on Tuesday 11/15. Pt to continue atorvastatin and  DAPT on discharge. Blood pressure was at goal, so will decrease lisinopril to 5mg daily until his procedure; will likely need to increase back to previous dose after his procedure. Smoking cessation education provided and Pt prescribed nicotine patches on discharge; will also be referred to smoking cessation program. Pt to follow-up with his PCP, vascular neurology, and vascular surgery on discharge. No home therapies are needed and Pt is able to discharge home.       Goals of Care Treatment Preferences:  Code Status: Full Code      Stroke Etiology: TIA    STROKE DOCUMENTATION       NIH Scale:  1a. Level of Consciousness: 0-->Alert, keenly responsive  1b. LOC Questions: 0-->Answers both questions correctly  1c. LOC Commands: 0-->Performs both tasks correctly  2. Best Gaze: 0-->Normal  3. Visual: 0-->No visual loss  4. Facial Palsy: 0-->Normal symmetrical movements  5a. Motor Arm, Left: 0-->No drift, limb holds 90 (or 45) degrees for full 10 secs  5b. Motor Arm, Right: 0-->No drift, limb holds 90 (or 45) degrees for full 10 secs  6a. Motor Leg, Left: 0-->No drift, leg holds 30 degree position for full 5 secs  6b. Motor Leg, Right: 0-->No drift, leg holds 30 degree position for full 5 secs  7. Limb Ataxia: 0-->Absent  8. Sensory: 0-->Normal, no sensory loss  9. Best Language: 0-->No aphasia, normal  10. Dysarthria: 0-->Normal  11. Extinction and Inattention (formerly Neglect): 0-->No abnormality  Total (NIH Stroke Scale): 0    Modified [place] Score: 0  [place] Coma Scale:    ABCD2 Score: 4  [id number] Score:   HAS -BLED Score:   ICH Score:   Hunt & Rudolph Classification:       Assessment/Plan:     Diagnostic Results:    Brain/Vessel imaging:  CTA Head/Neck 11/8/22  Impression:  - No acute intracranial abnormality.  - Significant stenosis involving the right carotid bifurcation and proximal ICA.  - 50% stenosis of the proximal ICA on the left.  - Multifocal significant stenosis of the left vertebral artery.     MRI  Brain w/o contrast 11/8/22  Impression:  1. There is no acute or significant intracranial abnormality.  There is only mild volume loss.  There is no hemorrhage, mass, mass effect or acute infarction.  2. Left greater than right mastoid effusions.     Bilateral Carotid US 11/9/22  Impression:  Sonographic findings compatible with 50-69% stenosis of the right ICA and greater than 70% stenosis of the left ICA.     Cardiac Evaluation:   TTE with bubble 11/9/22  The left ventricle is normal in size with concentric remodeling and normal systolic function.  Normal left ventricular diastolic function.  The estimated PA systolic pressure is 45 mmHg.  Normal right ventricular size with normal right ventricular systolic function.  There is mild pulmonary hypertension.  Normal central venous pressure (3 mmHg).  Study is positive for intercardiac shunt that is right to left.  Moderate left atrial enlargement.  The estimated ejection fraction is 60%.  There is mild aortic valve stenosis.  Aortic valve area is 3.66 cm2; peak velocity is 2.14 m/s; mean gradient is 11 mmHg.  Mild right atrial enlargement.  There is a patent foramen ovale present with right to left shunting indicated by saline contrast.    Interventions: None    Complications: None    Disposition:     Final Active Diagnoses:    Diagnosis Date Noted POA    PRINCIPAL PROBLEM:  Transient ischemic attack [G45.9] 11/10/2022 Yes    Marijuana use [F12.90] 11/11/2022 Yes     Chronic    Mixed hyperlipidemia [E78.2] 11/09/2022 Yes     Chronic    Chronic respiratory failure with hypoxia [J96.11] 11/09/2022 Yes     Chronic    Bilateral carotid artery stenosis [I65.23] 11/09/2022 Yes    Severe obesity (BMI >= 40) [E66.01] 11/09/2022 Yes     Chronic    Chronic heart failure with preserved ejection fraction [I50.32] 11/09/2022 Yes     Chronic    Essential hypertension [I10] 10/08/2019 Yes     Chronic    Pulmonary emphysema [J43.9] 12/29/2016 Yes     Chronic    Tobacco abuse [Z72.0]  08/28/2013 Yes     Chronic      Problems Resolved During this Admission:     No new Assessment & Plan notes have been filed under this hospital service since the last note was generated.  Service: Vascular Neurology      Recommendations:     Post-discharge complication risks: None    Stroke Education given to: patient    Follow-up in Stroke Clinic in 4-6 weeks.     Discharge Plan:  Antithrombotics: Aspirin 81mg, clopidogrel 75mg  Statin: Atorvastatin 80mg  Smoking Cessation  Aggresive risk factor modification:  Hypertension  Smoking  High Cholesterol  Diet  Exercise  Obesity  Carotid Artery disease    Follow Up:    Patient Instructions:      Ambulatory referral/consult to Vascular Neurology   Standing Status: Future   Referral Priority: Routine Referral Type: Consultation   Referral Reason: Specialty Services Required   Requested Specialty: Vascular Neurology   Number of Visits Requested: 1     Ambulatory referral/consult to Smoking Cessation Program   Standing Status: Future   Referral Priority: Routine Referral Type: Consultation   Referral Reason: Specialty Services Required   Requested Specialty: CTTS   Number of Visits Requested: 1     Ambulatory referral/consult to Vascular Surgery   Standing Status: Future   Referral Priority: Routine Referral Type: Consultation   Referral Reason: Specialty Services Required   Requested Specialty: Vascular Surgery   Number of Visits Requested: 1       Medications:  Reconciled Home Medications:      Medication List        START taking these medications      aspirin 81 MG Chew  Take 1 tablet (81 mg total) by mouth once daily.  Start taking on: November 12, 2022     atorvastatin 80 MG tablet  Commonly known as: LIPITOR  Take 1 tablet (80 mg total) by mouth once daily.  Start taking on: November 12, 2022     clopidogreL 75 mg tablet  Commonly known as: PLAVIX  Take 1 tablet (75 mg total) by mouth once daily.  Start taking on: November 12, 2022     nicotine 21 mg/24 hr  Commonly  known as: NICODERM CQ  Place 1 patch onto the skin once daily.  Start taking on: November 12, 2022            CHANGE how you take these medications      lisinopriL 5 MG tablet  Commonly known as: IVIL,ZESTRIL  Take 1 tablet (5 mg total) by mouth once daily. Take 5mg daily until after your carotid artery procedure. The dose may increase back to your previous dose afterwards, but check with your healthcare provider.  What changed:   medication strength  how much to take  additional instructions            CONTINUE taking these medications      ALBUTEROL INHL  Inhale into the lungs.     buPROPion 150 MG TB24 tablet  Commonly known as: WELLBUTRIN XL  Take 150 mg by mouth once daily.     gabapentin 300 MG capsule  Commonly known as: NEURONTIN  Take 300 mg by mouth 2 (two) times daily.     montelukast 10 mg tablet  Commonly known as: SINGULAIR  Take 10 mg by mouth every evening.     pantoprazole 40 MG tablet  Commonly known as: PROTONIX  Take 40 mg by mouth once daily.            STOP taking these medications      lisinopriL-hydrochlorothiazide 20-25 mg Tab  Commonly known as: PRINZIDE,ZESTORETIC     sertraline 50 MG tablet  Commonly known as: ZOLOFT     simvastatin 20 MG tablet  Commonly known as: ZOCOR            Yulissa Sanchez MD  Comprehensive Stroke Center  Department of Vascular Neurology   [facility])

## 2022-11-11 NOTE — SUBJECTIVE & OBJECTIVE
Medications Prior to Admission   Medication Sig Dispense Refill Last Dose    ALBUTEROL INHL Inhale into the lungs.       buPROPion (WELLBUTRIN XL) 150 MG TB24 tablet Take 150 mg by mouth once daily.       gabapentin (NEURONTIN) 300 MG capsule Take 300 mg by mouth 2 (two) times daily.       montelukast (SINGULAIR) 10 mg tablet Take 10 mg by mouth every evening.       pantoprazole (PROTONIX) 40 MG tablet Take 40 mg by mouth once daily.       [DISCONTINUED] lisinopriL-hydrochlorothiazide (PRINZIDE,ZESTORETIC) 20-25 mg Tab Take 1 tablet by mouth once daily.       [DISCONTINUED] sertraline (ZOLOFT) 50 MG tablet Take 50 mg by mouth every evening.       [DISCONTINUED] simvastatin (ZOCOR) 20 MG tablet Take 1 tablet by mouth once daily.          Review of patient's allergies indicates:   Allergen Reactions    Maple flavor Other (See Comments)     Throat swelling         Past Medical History:   Diagnosis Date    CHF (congestive heart failure)     COPD (chronic obstructive pulmonary disease)     HLD (hyperlipidemia)     Hypertension     MI, old      Past Surgical History:   Procedure Laterality Date    LAPAROSCOPIC CHOLECYSTECTOMY N/A 4/11/2022    Procedure: CHOLECYSTECTOMY, LAPAROSCOPIC;  Surgeon: Neville Hutchinson MD;  Location: [facility];  Service: General;  Laterality: N/A;    TONSILLECTOMY       Family History       Problem Relation (Age of Onset)    Hypertension Father          Tobacco Use    Smoking status: Every Day     Packs/day: 1.00     Years: 30.00     Pack years: 30.00     Types: Cigarettes    Smokeless tobacco: Not on file   Substance and Sexual Activity    Alcohol use: Yes     Comment: a little    Drug use: Yes     Types: Marijuana     Comment: 3 to 4 times a week    Sexual activity: Yes     Partners: Female     Review of Systems   Constitutional:  Negative for activity change.   Neurological: Negative.  Negative for seizures, syncope, speech difficulty and weakness.        Asymptomatic at time of encounter   All  other systems reviewed and are negative.  Objective:     Vital Signs (Most Recent):  Temp: 98.3 °F (36.8 °C) (11/11/22 1214)  Pulse: 71 (11/11/22 1214)  Resp: 19 (11/11/22 1214)  BP: (!) 168/87 (11/11/22 1214)  SpO2: (!) 93 % (11/11/22 1214)   Vital Signs (24h Range):  Temp:  [96.6 °F (35.9 °C)-98.7 °F (37.1 °C)] 98.3 °F (36.8 °C)  Pulse:  [61-97] 71  Resp:  [16-20] 19  SpO2:  [93 %-97 %] 93 %  BP: (120-188)/(60-90) 168/87     Weight: (!) 140.2 kg (309 lb)  Body mass index is 44.98 kg/m².    Physical Exam  Vitals and nursing note reviewed.   Constitutional:       Appearance: Normal appearance.   HENT:      Head: Normocephalic and atraumatic.   Cardiovascular:      Rate and Rhythm: Normal rate and regular rhythm.   Pulmonary:      Effort: Pulmonary effort is normal. No respiratory distress.   Abdominal:      General: Abdomen is flat. There is no distension.      Palpations: Abdomen is soft. There is no mass.      Tenderness: There is no abdominal tenderness.      Hernia: No hernia is present.   Musculoskeletal:      Right lower leg: No edema.      Left lower leg: No edema.   Skin:     General: Skin is warm and dry.   Neurological:      General: No focal deficit present.      Mental Status: He is alert and oriented to person, place, and time.      Cranial Nerves: No cranial nerve deficit.      Sensory: No sensory deficit.      Motor: No weakness.      Coordination: Coordination normal.   Psychiatric:         Mood and Affect: Mood normal.         Behavior: Behavior normal.       Significant Labs:  CBC:   Recent Labs   Lab 11/11/22 0453   WBC 8.32   RBC 4.87   HGB 15.7   HCT 48.0      MCV 99*   MCH 32.2*   MCHC 32.7     CMP:   Recent Labs   Lab 11/11/22 0453   GLU 87   CALCIUM 9.3   ALBUMIN 3.8   PROT 7.1      K 4.5   CO2 29   CL 99   BUN 25*   CREATININE 0.8   ALKPHOS 79   ALT 17   AST 17   BILITOT 1.5*       Significant Diagnostics:  I have reviewed all pertinent imaging results/findings within the past  24 hours.

## 2022-11-11 NOTE — HPI
Name is a 57 year old man with history of HTN, HLD, HFpEF, CAD s/p MI, COPD (previously on home oxygen), smoking, obesity who initially presented to [facility] 11/8, transferred to [facility] 11/10 for Vascular Surgery evaluation. On 11/8 at 1830 began having right arm numbness and weakness with aphasia, progressed to numbness and weakness of right leg as well. Symptoms improving by time of ED evaluation around 2030 that day. Initial /87. Evaluated via telestroke by Dr. Drummond, NIHSS 2. CTA negative for LVO, showed significant stenosis of R carotid bifurcation/proximal ICA and 50% stenosis of proximal L ICA. Did not receive thrombolytic as symptoms were improving. Loaded with  mg and Plavix 300 mg then continued on DAPT. MRI Brain showed no acute abnormality. Carotid US showed 50-69% stenosis of R ICA and >70% stenosis of L ICA.     While at [facility], evaluated by PT/OT with recommendation for home without further therapy and approved for regular diet with thin liquids by SLP.    On evaluation, patient reports that his symptoms on 11/8 resolved after about 2 hours. Currently feels he is at baseline. NIHSS zero. Last PO intake prior to transfer was around 1700 11/10. Lives with his wife.

## 2022-11-11 NOTE — PLAN OF CARE
Problem: Adult Inpatient Plan of Care  Goal: Plan of Care Review  Outcome: Ongoing, Progressing  Goal: Patient-Specific Goal (Individualized)  Outcome: Ongoing, Progressing  Goal: Absence of Hospital-Acquired Illness or Injury  Outcome: Ongoing, Progressing  Goal: Optimal Comfort and Wellbeing  Outcome: Ongoing, Progressing  Goal: Readiness for Transition of Care  Outcome: Ongoing, Progressing     Problem: Fluid and Electrolyte Imbalance (Acute Kidney Injury/Impairment)  Goal: Fluid and Electrolyte Balance  Outcome: Ongoing, Progressing     Problem: Oral Intake Inadequate (Acute Kidney Injury/Impairment)  Goal: Optimal Nutrition Intake  Outcome: Ongoing, Progressing     Problem: Renal Function Impairment (Acute Kidney Injury/Impairment)  Goal: Effective Renal Function  Outcome: Ongoing, Progressing     Problem: Bariatric Environmental Safety  Goal: Safety Maintained with Care  Outcome: Ongoing, Progressing     Problem: Adjustment to Illness (Stroke, Ischemic/Transient Ischemic Attack)  Goal: Optimal Coping  Outcome: Ongoing, Progressing     Problem: Bowel Elimination Impaired (Stroke, Ischemic/Transient Ischemic Attack)  Goal: Effective Bowel Elimination  Outcome: Ongoing, Progressing     Problem: Cerebral Tissue Perfusion (Stroke, Ischemic/Transient Ischemic Attack)  Goal: Optimal Cerebral Tissue Perfusion  Outcome: Ongoing, Progressing     Problem: Cognitive Impairment (Stroke, Ischemic/Transient Ischemic Attack)  Goal: Optimal Cognitive Function  Outcome: Ongoing, Progressing     Problem: Communication Impairment (Stroke, Ischemic/Transient Ischemic Attack)  Goal: Improved Communication Skills  Outcome: Ongoing, Progressing     Problem: Functional Ability Impaired (Stroke, Ischemic/Transient Ischemic Attack)  Goal: Optimal Functional Ability  Outcome: Ongoing, Progressing     Problem: Respiratory Compromise (Stroke, Ischemic/Transient Ischemic Attack)  Goal: Effective Oxygenation and Ventilation  Outcome:  Ongoing, Progressing     Problem: Sensorimotor Impairment (Stroke, Ischemic/Transient Ischemic Attack)  Goal: Improved Sensorimotor Function  Outcome: Ongoing, Progressing     Problem: Swallowing Impairment (Stroke, Ischemic/Transient Ischemic Attack)  Goal: Optimal Eating and Swallowing without Aspiration  Outcome: Ongoing, Progressing     Problem: Urinary Elimination Impaired (Stroke, Ischemic/Transient Ischemic Attack)  Goal: Effective Urinary Elimination  Outcome: Ongoing, Progressing    POC reviewed with the patient and they verbalized understanding. All comments and concerns addressed. Bed locked in lowest position with bed alarm set, call light within reach. Safety precautions maintained. VSS, see flowsheets. No events this shift. Will continue to monitor for changes to POC and clinical condition.

## 2022-11-11 NOTE — ASSESSMENT & PLAN NOTE
Stroke risk factor  Prior to presentation taking lisinopril 20 mg and HCTZ 25 mg QD  - restart home meds as appropriate

## 2022-11-11 NOTE — SUBJECTIVE & OBJECTIVE
Past Medical History:   Diagnosis Date    CHF (congestive heart failure)     COPD (chronic obstructive pulmonary disease)     HLD (hyperlipidemia)     Hypertension     MI, old      Past Surgical History:   Procedure Laterality Date    LAPAROSCOPIC CHOLECYSTECTOMY N/A 4/11/2022    Procedure: CHOLECYSTECTOMY, LAPAROSCOPIC;  Surgeon: Neville Hutchinson MD;  Location: [facility];  Service: General;  Laterality: N/A;    TONSILLECTOMY       Family History   Problem Relation Age of Onset    Hypertension Father      Social History     Tobacco Use    Smoking status: Every Day     Packs/day: 1.00     Years: 30.00     Pack years: 30.00     Types: Cigarettes   Substance Use Topics    Alcohol use: Yes     Comment: a little    Drug use: Yes     Types: Marijuana     Comment: 3 to 4 times a week     Review of patient's allergies indicates:   Allergen Reactions    Maple flavor Other (See Comments)     Throat swelling         Medications: I have reviewed the current medication administration record.    Medications Prior to Admission   Medication Sig Dispense Refill Last Dose    ALBUTEROL INHL Inhale into the lungs.       buPROPion (WELLBUTRIN XL) 150 MG TB24 tablet Take 150 mg by mouth once daily.       gabapentin (NEURONTIN) 300 MG capsule Take 300 mg by mouth 2 (two) times daily.       lisinopriL 10 MG tablet Take 10 mg by mouth once daily.       lisinopriL-hydrochlorothiazide (PRINZIDE,ZESTORETIC) 20-25 mg Tab Take 1 tablet by mouth once daily.       montelukast (SINGULAIR) 10 mg tablet Take 10 mg by mouth every evening.       pantoprazole (PROTONIX) 40 MG tablet Take 40 mg by mouth once daily.       sertraline (ZOLOFT) 50 MG tablet Take 50 mg by mouth every evening.       simvastatin (ZOCOR) 20 MG tablet Take 1 tablet by mouth once daily.          Review of Systems   Constitutional:  Negative for chills and fever.   HENT:  Negative for rhinorrhea and sneezing.    Eyes:  Negative for pain and redness.   Respiratory:  Negative for  cough and shortness of breath.    Gastrointestinal:  Negative for abdominal pain and nausea.   Genitourinary:  Negative for difficulty urinating and dysuria.   Musculoskeletal:  Negative for back pain and neck pain.   Neurological:  Negative for tremors, syncope, speech difficulty (resolved), weakness (resolved) and numbness (resolved).   Psychiatric/Behavioral:  Negative for agitation and confusion.    Objective:     Vital Signs (Most Recent):  Temp: 96.8 °F (36 °C) (11/11/22 0300)  Pulse: 91 (11/11/22 0300)  Resp: 16 (11/11/22 0300)  BP: (!) 188/84 (11/11/22 0300)  SpO2: (!) 94 % (11/11/22 0300)    Vital Signs Range (Last 24H):  Temp:  [96.6 °F (35.9 °C)-98.7 °F (37.1 °C)]   Pulse:  [61-91]   Resp:  [16-20]   BP: (120-188)/(79-90)   SpO2:  [93 %-97 %]     Physical Exam  Vitals and nursing note reviewed.   Constitutional:       General: He is not in acute distress.     Appearance: He is obese.   HENT:      Head: Normocephalic and atraumatic.      Nose: Nose normal. No rhinorrhea.   Eyes:      General:         Right eye: No discharge.         Left eye: No discharge.      Conjunctiva/sclera: Conjunctivae normal.   Pulmonary:      Effort: Pulmonary effort is normal. No respiratory distress.   Musculoskeletal:         General: Normal range of motion.   Skin:     General: Skin is warm and dry.      Capillary Refill: Capillary refill takes less than 2 seconds.   Neurological:      Mental Status: He is alert.       Neurological Exam:   LOC: alert  Attention Span: Good   Language: No aphasia  Articulation: Dysarthria  Orientation: Person, Place, Time   Visual Fields: Full  EOM (CN III, IV, VI): Full/intact  Pupils (CN II, III): PERRL  Facial Sensation (CN V): Normal  Facial Movement (CN VII): Symmetric facial expression    Motor: 5/5 in all extremities  Cerebellum: No evidence of appendicular or axial ataxia  Sensation: intact to light touch in all extremities      Laboratory:  CMP:   Recent Labs   Lab 11/10/22  4318    CALCIUM 8.9   ALBUMIN 3.6   PROT 6.9      K 5.0   CO2 31*      BUN 31*   CREATININE 1.1   ALKPHOS 86   ALT 19   AST 14   BILITOT 0.6     CBC:   Recent Labs   Lab 11/10/22  0440   WBC 8.12   RBC 4.77   HGB 15.1   HCT 47.6      *   MCH 31.7*   MCHC 31.7*     Lipid Panel:   Recent Labs   Lab 11/08/22 2040   CHOL 208*   LDLCALC 97.6   HDL 49   TRIG 307*     Coagulation:   Recent Labs   Lab 11/09/22  0434   INR 1.0   APTT 24.5     Hgb A1C:   Recent Labs   Lab 11/08/22 2040   HGBA1C 5.5     TSH:   Recent Labs   Lab 11/08/22 2040   TSH 3.261       Diagnostic Results:  Brain/Vessel imaging:  CTA Head/Neck 11/8/22  Impression:  - No acute intracranial abnormality.  - Significant stenosis involving the right carotid bifurcation and proximal ICA.  - 50% stenosis of the proximal ICA on the left.  - Multifocal significant stenosis of the left vertebral artery.    MRI Brain w/o contrast 11/8/22  Impression:  1. There is no acute or significant intracranial abnormality.  There is only mild volume loss.  There is no hemorrhage, mass, mass effect or acute infarction.  2. Left greater than right mastoid effusions.    Bilateral Carotid US 11/9/22  Impression:  Sonographic findings compatible with 50-69% stenosis of the right ICA and greater than 70% stenosis of the left ICA.    Cardiac Evaluation:   TTE with bubble 11/9/22  The left ventricle is normal in size with concentric remodeling and normal systolic function.  Normal left ventricular diastolic function.  The estimated PA systolic pressure is 45 mmHg.  Normal right ventricular size with normal right ventricular systolic function.  There is mild pulmonary hypertension.  Normal central venous pressure (3 mmHg).  Study is positive for intercardiac shunt that is right to left.  Moderate left atrial enlargement.  The estimated ejection fraction is 60%.  There is mild aortic valve stenosis.  Aortic valve area is 3.66 cm2; peak velocity is 2.14 m/s; mean  gradient is 11 mmHg.  Mild right atrial enlargement.  There is a patent foramen ovale present with right to left shunting indicated by saline contrast.

## 2022-11-11 NOTE — HOSPITAL COURSE
Patient was admitted with TIA symptoms and SHAKIR. Patient was monitored closely during his stay.  He was maintained on telemetry and his laboratory work was trended. He was placed on stroke pathway.  He was given aspirin and Plavix as well as atorvastatin.  Neurology was consulted.  He underwent CTA of the head and neck, MRI of the brain, and carotid ultrasound.  Echocardiogram revealed patent foramen ovale.  He was noted to have significant carotid artery stenosis and vascular surgery consultation was recommended.  Patient was accepted to [facility] for vascular surgery consultation.

## 2022-11-11 NOTE — NURSING
Home Oxygen Evaluation    Date Performed: 2022    1) Patient's Home O2 Sat on room air, while at rest: 92%        If O2 sats on room air at rest are 88% or below, patient qualifies. No additional testing needed. Document N/A in steps 2 and 3. If 89% or above, complete steps 2.      2) Patient's O2 Sat on room air while exercisin%        If O2 sats on room air while exercising remain 89% or above patient does not qualify, no further testing needed Document N/A in step 3. If O2 sats on room air while exercising are 88% or below, continue to step 3.      3) Patient's O2 Sat while exercising on O2: 91 at 3 LPM         (Must show improvement from #2 for patients to qualify)    If O2 sats improve on oxygen, patient qualifies for portable oxygen. If not, the patient does not qualify.

## 2022-11-11 NOTE — HPI
57 year old man with HTN, HLD, CAD s/p MI, HFpEF(most recent echo 11/08/22), COPD, smoking, obesity who initially presented to [facility] 11/8 with TIA (RSW and numbness, aphasia). CTA showed no acute abnormality with 80% stenosis of R carotid bifurcation. MRI with no acute process. Carotid US showed >70% stenosis of L ICA and 50-69% stenosis of R ICA. Transferred to [facility] [place] for Vascular Surgery consult.

## 2022-11-11 NOTE — CONSULTS
[facility] - Neurosurgery ([facility])  Vascular Surgery  Consult Note    Inpatient consult to Vascular Surgery  Consult performed by: Josette Shaw MD  Consult ordered by: Joy Khalil MD      Subjective:     Chief Complaint/Reason for Admission: TIA    History of Present Illness: 57 year old man with HTN, HLD, CAD s/p MI, HFpEF(most recent echo 11/08/22), COPD, smoking, obesity who initially presented to [facility] 11/8 with TIA (RSW and numbness, aphasia). CTA showed no acute abnormality with 80% stenosis of R carotid bifurcation. MRI with no acute process. Carotid US showed >70% stenosis of L ICA and 50-69% stenosis of R ICA. Transferred to [facility] for Vascular Surgery consult.      Medications Prior to Admission   Medication Sig Dispense Refill Last Dose    ALBUTEROL INHL Inhale into the lungs.       buPROPion (WELLBUTRIN XL) 150 MG TB24 tablet Take 150 mg by mouth once daily.       gabapentin (NEURONTIN) 300 MG capsule Take 300 mg by mouth 2 (two) times daily.       montelukast (SINGULAIR) 10 mg tablet Take 10 mg by mouth every evening.       pantoprazole (PROTONIX) 40 MG tablet Take 40 mg by mouth once daily.       [DISCONTINUED] lisinopriL-hydrochlorothiazide (PRINZIDE,ZESTORETIC) 20-25 mg Tab Take 1 tablet by mouth once daily.       [DISCONTINUED] sertraline (ZOLOFT) 50 MG tablet Take 50 mg by mouth every evening.       [DISCONTINUED] simvastatin (ZOCOR) 20 MG tablet Take 1 tablet by mouth once daily.          Review of patient's allergies indicates:   Allergen Reactions    Maple flavor Other (See Comments)     Throat swelling         Past Medical History:   Diagnosis Date    CHF (congestive heart failure)     COPD (chronic obstructive pulmonary disease)     HLD (hyperlipidemia)     Hypertension     MI, old      Past Surgical History:   Procedure Laterality Date    LAPAROSCOPIC CHOLECYSTECTOMY N/A 4/11/2022    Procedure: CHOLECYSTECTOMY, LAPAROSCOPIC;  Surgeon: Neville Hutchinson MD;  Location: [facility];  Service:  General;  Laterality: N/A;    TONSILLECTOMY       Family History       Problem Relation (Age of Onset)    Hypertension Father          Tobacco Use    Smoking status: Every Day     Packs/day: 1.00     Years: 30.00     Pack years: 30.00     Types: Cigarettes    Smokeless tobacco: Not on file   Substance and Sexual Activity    Alcohol use: Yes     Comment: a little    Drug use: Yes     Types: Marijuana     Comment: 3 to 4 times a week    Sexual activity: Yes     Partners: Female     Review of Systems   Constitutional: Negative for weight loss.   HENT:  Negative for ear pain and nosebleeds.    Eyes:  Negative for discharge and pain.   Cardiovascular:  Negative for chest pain and palpitations.   Respiratory:  Negative for cough, shortness of breath and wheezing.    Endocrine: Negative for cold intolerance, heat intolerance and polyphagia.   Hematologic/Lymphatic: Negative for adenopathy. Does not bruise/bleed easily.   Skin:  Negative for itching and rash.   Musculoskeletal:  Negative for joint swelling and muscle cramps.   Gastrointestinal:  Negative for abdominal pain, diarrhea, nausea and vomiting.   Genitourinary:  Negative for dysuria and flank pain.   Neurological:  Negative for numbness and seizures.       Objective:     Vital Signs (Most Recent):  Temp: 98.3 °F (36.8 °C) (11/11/22 1214)  Pulse: 71 (11/11/22 1214)  Resp: 19 (11/11/22 1214)  BP: (!) 168/87 (11/11/22 1214)  SpO2: (!) 93 % (11/11/22 1214)   Vital Signs (24h Range):  Temp:  [96.6 °F (35.9 °C)-98.7 °F (37.1 °C)] 98.3 °F (36.8 °C)  Pulse:  [61-97] 71  Resp:  [16-20] 19  SpO2:  [93 %-97 %] 93 %  BP: (120-188)/(60-90) 168/87     Weight: (!) 140.2 kg (309 lb)  Body mass index is 44.98 kg/m².    Physical Exam  Vitals and nursing note reviewed.   Constitutional:       Appearance: Normal appearance.   HENT:      Head: Normocephalic and atraumatic.   Cardiovascular:      Rate and Rhythm: Normal rate and regular rhythm.   Pulmonary:      Effort: Pulmonary  effort is normal. No respiratory distress.   Abdominal:      General: Abdomen is flat. There is no distension.      Palpations: Abdomen is soft. There is no mass.      Tenderness: There is no abdominal tenderness.      Hernia: No hernia is present.   Musculoskeletal:      Right lower leg: No edema.      Left lower leg: No edema.   Skin:     General: Skin is warm and dry.   Neurological:      General: No focal deficit present.      Mental Status: He is alert and oriented to person, place, and time.      Cranial Nerves: No cranial nerve deficit.      Sensory: No sensory deficit.      Motor: No weakness.      Coordination: Coordination normal.   Psychiatric:         Mood and Affect: Mood normal.         Behavior: Behavior normal.       Significant Labs:  CBC:   Recent Labs   Lab 11/11/22 0453   WBC 8.32   RBC 4.87   HGB 15.7   HCT 48.0      MCV 99*   MCH 32.2*   MCHC 32.7     CMP:   Recent Labs   Lab 11/11/22 0453   GLU 87   CALCIUM 9.3   ALBUMIN 3.8   PROT 7.1      K 4.5   CO2 29   CL 99   BUN 25*   CREATININE 0.8   ALKPHOS 79   ALT 17   AST 17   BILITOT 1.5*       Significant Diagnostics:  I have reviewed all pertinent imaging results/findings within the past 24 hours.    Assessment/Plan:     * Transient ischemic attack  56 yo M with BCAS who presented after TIA with right sided symptoms, L ICA stenosis > 70%    - Plan for L CEA 11/15  - Consent obtained  - Okay to DC and return as outpatient  - Return sooner if symptoms return         Thank you for your consult. I will follow-up with patient. Please contact us if you have any additional questions.    Josette Shaw MD  Vascular Surgery  [facility] - Neurosurgery [facility])    VASCULAR SURGERY ATTENDING ATTESTATION    I have seen and examined the patient and I have taken the history and reviewed the chart/studies and personally reviewed and interpreted the relevant imaging. CTA shows he has left ICA stenosis >70%. He is symptomatic with left hemispheric  TIA. would benefit from left CEA to reduce risk of stroke. Will plan left CEA next week.     ELIDA Guadalupe II, MD, [facility]  Vascular Surgery  [facility]

## 2022-11-11 NOTE — RESPIRATORY THERAPY
11/10/22 1917   Patient Assessment/Suction   Level of Consciousness (AVPU) alert   Respiratory Effort Unlabored   Expansion/Accessory Muscles/Retractions no retractions;no use of accessory muscles   All Lung Fields Breath Sounds diminished   Rhythm/Pattern, Respiratory unlabored   Cough Frequency no cough   PRE-TX-O2   O2 Device (Oxygen Therapy) nasal cannula   $ Is the patient on Low Flow Oxygen? Yes   Flow (L/min) 2   SpO2 97 %   Pulse Oximetry Type Intermittent   $ Pulse Oximetry - Multiple Charge Pulse Oximetry - Multiple   Pulse 73   Resp 18   Aerosol Therapy   $ Aerosol Therapy Charges Aerosol Treatment   Respiratory Treatment Status (SVN) given   Treatment Route (SVN) mask;oxygen   Patient Position (SVN) sitting on edge of bed   Post Treatment Assessment (SVN) breath sounds unchanged   Signs of Intolerance (SVN) none   Breath Sounds Post-Respiratory Treatment   Throughout All Fields Post-Treatment All Fields   Throughout All Fields Post-Treatment no change   Post-treatment Heart Rate (beats/min) 71   Post-treatment Resp Rate (breaths/min) 20
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
## 2022-11-11 NOTE — ASSESSMENT & PLAN NOTE
Stroke risk factor, LDL 97.6  Prior to presentation taking simvastatin 20 mg QD  - atorvastatin 80 mg QD

## 2022-11-12 NOTE — NURSING
AVS reviewed w/ patient. Patient verbalized understanding of education. All comments and concerns addressed. PIV & telemetry removed. VSS at discharge. All belongings along with home health ordered portable O2 tank sent with pt via private vehicle.

## 2022-11-14 ENCOUNTER — TELEPHONE (OUTPATIENT)
Dept: MEDSURG UNIT | Facility: HOSPITAL | Age: 57
End: 2022-11-14
Payer: MEDICAID

## 2022-11-16 ENCOUNTER — ANESTHESIA EVENT (OUTPATIENT)
Dept: SURGERY | Facility: HOSPITAL | Age: 57
DRG: 038 | End: 2022-11-16
Payer: MEDICAID

## 2022-11-16 ENCOUNTER — TELEPHONE (OUTPATIENT)
Dept: VASCULAR SURGERY | Facility: CLINIC | Age: 57
End: 2022-11-16
Payer: MEDICAID

## 2022-11-16 RX ORDER — DICLOFENAC SODIUM 75 MG/1
75 TABLET, DELAYED RELEASE ORAL
COMMUNITY
End: 2023-01-20

## 2022-11-16 RX ORDER — ALBUTEROL SULFATE 90 UG/1
2 AEROSOL, METERED RESPIRATORY (INHALATION) EVERY 6 HOURS PRN
COMMUNITY
Start: 2022-10-28

## 2022-11-16 RX ORDER — FLUTICASONE FUROATE AND VILANTEROL 200; 25 UG/1; UG/1
POWDER RESPIRATORY (INHALATION)
COMMUNITY

## 2022-11-16 RX ORDER — LISINOPRIL AND HYDROCHLOROTHIAZIDE 20; 25 MG/1; MG/1
TABLET ORAL
COMMUNITY

## 2022-11-16 RX ORDER — ALBUTEROL SULFATE 0.83 MG/ML
2.5 SOLUTION RESPIRATORY (INHALATION) EVERY 4 HOURS PRN
COMMUNITY
Start: 2022-10-31

## 2022-11-16 NOTE — TELEPHONE ENCOUNTER
Spoke with  Tony, time of arrival  0800am 2nd floor DOSC for Mr Jimenez surgery on 11/17/2022 confirmed.

## 2022-11-16 NOTE — ANESTHESIA PREPROCEDURE EVALUATION
[facility]  Anesthesia Pre-Operative Evaluation        Patient Name: Renard Jimenez  YOB: 1965  MRN: [id number]    SUBJECTIVE:     Pre-operative Evaluation for Procedure(s) (LRB):  ENDARTERECTOMY-CAROTID (Left)     11/16/2022    Renard Jimenez is a 57 y.o. male with a PMHx significant for of HTN, HLD, HFpEF, CAD, COPD (previously on home oxygen), smoking, morbid obesity, tobacco abuse, and bilateral carotid artery stenosis. Of note, patient was recently admitted for TIA in setting of acute right UE and LE numbness and aphasia. At that time his workup revealed 50-69% stenosis of the right ICA and greater than 70% stenosis of the left ICA.    He now presents for the above procedure(s).      TTE:  Results for orders placed during the hospital encounter of 11/08/22  Echo Saline Bubble? Yes  Interpretation Summary  · The left ventricle is normal in size with concentric remodeling and normal systolic function.  · Normal left ventricular diastolic function.  · The estimated PA systolic pressure is 45 mmHg.  · Normal right ventricular size with normal right ventricular systolic function.  · There is mild pulmonary hypertension.  · Normal central venous pressure (3 mmHg).  · Study is positive for intercardiac shunt that is right to left.  · Moderate left atrial enlargement.  · The estimated ejection fraction is 60%.  · There is mild aortic valve stenosis.  · Aortic valve area is 3.66 cm2; peak velocity is 2.14 m/s; mean gradient is 11 mmHg.  · Mild right atrial enlargement.  · There is a patent foramen ovale present with right to left shunting indicated by saline contrast.    Previous Airway (4/11/22):   Induction:  Intravenous    Intubated:  Postinduction    Mask Ventilation:  N/a    Attempts:  1    Attempted By:  CRNA    Method of Intubation:  Direct    Blade:  Obey 3    Laryngeal View Grade: Grade I - full view of cords      Difficult Airway Encountered?: No      Complications:   None    Airway Device:  Oral endotracheal tube    Airway Device Size:  8.0    Style/Cuff Inflation:  Cuffed (inflated to minimal occlusive pressure)    Inflation Amount (mL):  6    Tube secured:  22    Secured at:  The lips    Placement Verified By:  Capnometry    Complicating Factors:  None    Findings Post-Intubation:  BS equal bilateral and atraumatic/condition of teeth unchanged      Patient Active Problem List   Diagnosis    Tobacco abuse    Pulmonary emphysema    Essential hypertension    Stroke    SHAKIR (acute kidney injury)    Mixed hyperlipidemia    Chronic respiratory failure with hypoxia    Bilateral carotid artery stenosis    Macrocytosis    Severe obesity (BMI >= 40)    Chronic heart failure with preserved ejection fraction    Transient ischemic attack    Marijuana use       Review of patient's allergies indicates:   Allergen Reactions    Maple flavor Other (See Comments)     Throat swelling         Current Outpatient Medications   Medication Instructions    albuterol (PROVENTIL) 2.5 mg, Nebulization, Every 4 hours PRN    albuterol (PROVENTIL/VENTOLIN HFA) 90 mcg/actuation inhaler 2 puffs, Inhalation, Every 6 hours PRN    aspirin 81 mg, Oral, Daily    atorvastatin (LIPITOR) 80 mg, Oral, Daily    buPROPion (WELLBUTRIN XL) 150 mg, Oral, Daily    clopidogreL (PLAVIX) 75 mg, Oral, Daily    diclofenac (VOLTAREN) 75 mg    fluticasone furoate-vilanteroL (BREO) 200-25 mcg/dose DsDv diskus inhaler Breo Ellipta 200 mcg-25 mcg/dose powder for inhalation   Inhale by inhalation route for 30 days.    gabapentin (NEURONTIN) 300 mg, Oral, 2 times daily    lisinopriL (PRINIVIL,ZESTRIL) 5 mg, Oral, Daily, Take 5mg daily until after your carotid artery procedure. The dose may increase back to your previous dose afterwards, but check with your healthcare provider.    lisinopriL-hydrochlorothiazide (PRINZIDE,ZESTORETIC) 20-25 mg Tab lisinopril 20 mg-hydrochlorothiazide 25 mg tablet   TAKE ONE TABLET  BY MOUTH EVERY DAY    montelukast (SINGULAIR) 10 mg, Oral, Nightly    nicotine (NICODERM CQ) 21 mg/24 hr 1 patch, Transdermal, Daily    pantoprazole (PROTONIX) 40 mg, Oral, Daily       Past Surgical History:   Procedure Laterality Date    LAPAROSCOPIC CHOLECYSTECTOMY N/A 4/11/2022    Procedure: CHOLECYSTECTOMY, LAPAROSCOPIC;  Surgeon: Neville Hutchinson MD;  Location: [facility];  Service: General;  Laterality: N/A;    TONSILLECTOMY         Social History     Substance and Sexual Activity   Drug Use Yes    Types: Marijuana    Comment: 3 to 4 times a week     Alcohol Use: Unknown    Frequency of Alcohol Consumption: Patient refused    Average Number of Drinks: Patient refused    Frequency of Binge Drinking: Patient refused     Tobacco Use: High Risk    Smoking Tobacco Use: Every Day    Smokeless Tobacco Use: Unknown    Passive Exposure: Not on file       OBJECTIVE:     Vital Signs Range (Last 24H):         Significant Labs    Heme Profile  Lab Results   Component Value Date    WBC 8.32 11/11/2022    HGB 15.7 11/11/2022    HCT 48.0 11/11/2022     11/11/2022       Coagulation Studies  Lab Results   Component Value Date    LABPROT 10.6 11/11/2022    INR 1.0 11/11/2022    APTT 25.2 11/11/2022       BMP  Lab Results   Component Value Date     11/11/2022    K 4.5 11/11/2022    CL 99 11/11/2022    CO2 29 11/11/2022    BUN 25 (H) 11/11/2022    CREATININE 0.8 11/11/2022    MG 1.8 11/11/2022    PHOS 3.3 11/11/2022       Liver Function Tests  Lab Results   Component Value Date    AST 17 11/11/2022    ALT 17 11/11/2022    ALKPHOS 79 11/11/2022    BILITOT 1.5 (H) 11/11/2022    PROT 7.1 11/11/2022    ALBUMIN 3.8 11/11/2022       Lipid Profile  Lab Results   Component Value Date    CHOL 208 (H) 11/08/2022    HDL 49 11/08/2022    TRIG 307 (H) 11/08/2022       Endocrine Profile  Lab Results   Component Value Date    HGBA1C 5.5 11/08/2022    TSH 3.261 11/08/2022       Cardiac Studies    EKG:   Results for orders  placed or performed during the hospital encounter of 11/08/22   ECG 12 lead    Collection Time: 11/08/22  9:04 PM    Narrative    Test Reason : I63.9,    Vent. Rate : 098 BPM     Atrial Rate : 098 BPM     P-R Int : 188 ms          QRS Dur : 092 ms      QT Int : 358 ms       P-R-T Axes : 075 081 053 degrees     QTc Int : 457 ms    Sinus rhythm with marked sinus arrythmia  Incomplete right bundle branch block  Borderline Abnormal ECG  When compared with ECG of 08-OCT-2019 15:05,  No significant change was found  Confirmed by Que MARADIAGA, Raad WISDOM ([id number]) on 11/10/2022 6:03:21 PM    Referred By: [email or web address]   SELF           Confirmed By:Raad Grey MD         ASSESSMENT/PLAN:    11/16/2022  Renard Jimenez is a 57 y.o., male.      Pre-op Assessment    I have reviewed the Patient Summary Reports.     I have reviewed the Nursing Notes. I have reviewed the NPO Status.   I have reviewed the Medications.     Review of Systems  Anesthesia Hx:  No problems with previous Anesthesia   Denies Personal Hx of Anesthesia complications.   Social:  Smoker    Hematology/Oncology:  Hematology Normal   Oncology Normal     EENT/Dental:EENT/Dental Normal   Cardiovascular:   Hypertension CAD   CHF hyperlipidemia    Pulmonary:   COPD    Renal/:  Renal/ Normal     Hepatic/GI:  Hepatic/GI Normal    Musculoskeletal:  Musculoskeletal Normal    Neurological:   TIA,    Endocrine:  Endocrine Normal  Morbid Obesity / BMI > 40  Dermatological:  Skin Normal    Psych:  Psychiatric Normal           Physical Exam  General: Alert and Oriented    Airway:  Mallampati: II / II  Mouth Opening: Normal  TM Distance: Normal  Tongue: Normal  Neck ROM: Normal ROM    Dental:  Intact    Chest/Lungs:  Clear to auscultation, Normal Respiratory Rate    Heart:  Rate: Normal  Rhythm: Regular Rhythm  Sounds: Normal        Anesthesia Plan  Type of Anesthesia, risks & benefits discussed:    Anesthesia Type: Gen ETT  Intra-op Monitoring Plan: Standard ASA Monitors  and Art Line  Post Op Pain Control Plan: multimodal analgesia and IV/PO Opioids PRN  Induction:  IV  Airway Plan: Direct, Post-Induction  Informed Consent: Informed consent signed with the Patient and all parties understand the risks and agree with anesthesia plan.  All questions answered.   ASA Score: 3  Day of Surgery Review of History & Physical: H&P Update referred to the surgeon/provider.    Ready For Surgery From Anesthesia Perspective.     .

## 2022-11-17 ENCOUNTER — HOSPITAL ENCOUNTER (INPATIENT)
Facility: HOSPITAL | Age: 57
LOS: 1 days | Discharge: HOME | DRG: 038 | End: 2022-11-18
Attending: SURGERY | Admitting: SURGERY
Payer: MEDICAID

## 2022-11-17 ENCOUNTER — ANESTHESIA (OUTPATIENT)
Dept: SURGERY | Facility: HOSPITAL | Age: 57
DRG: 038 | End: 2022-11-17
Payer: MEDICAID

## 2022-11-17 DIAGNOSIS — I65.29 CAROTID STENOSIS: ICD-10-CM

## 2022-11-17 PROCEDURE — 71000016 HC POSTOP RECOV ADDL HR: Performed by: SURGERY

## 2022-11-17 PROCEDURE — C1768 GRAFT, VASCULAR: HCPCS | Performed by: SURGERY

## 2022-11-17 PROCEDURE — D9220A PRA ANESTHESIA: Mod: ANES,,, | Performed by: ANESTHESIOLOGY

## 2022-11-17 PROCEDURE — 63600175 PHARM REV CODE 636 W HCPCS: Performed by: NURSE ANESTHETIST, CERTIFIED REGISTERED

## 2022-11-17 PROCEDURE — 94640 AIRWAY INHALATION TREATMENT: CPT

## 2022-11-17 PROCEDURE — 25000003 PHARM REV CODE 250

## 2022-11-17 PROCEDURE — 63600175 PHARM REV CODE 636 W HCPCS: Performed by: SURGERY

## 2022-11-17 PROCEDURE — 25000242 PHARM REV CODE 250 ALT 637 W/ HCPCS: Performed by: NURSE ANESTHETIST, CERTIFIED REGISTERED

## 2022-11-17 PROCEDURE — 63600175 PHARM REV CODE 636 W HCPCS

## 2022-11-17 PROCEDURE — C1729 CATH, DRAINAGE: HCPCS | Performed by: SURGERY

## 2022-11-17 PROCEDURE — 71000015 HC POSTOP RECOV 1ST HR: Performed by: SURGERY

## 2022-11-17 PROCEDURE — D9220A PRA ANESTHESIA: ICD-10-PCS | Mod: ANES,,, | Performed by: ANESTHESIOLOGY

## 2022-11-17 PROCEDURE — 27800903 OPTIME MED/SURG SUP & DEVICES OTHER IMPLANTS: Performed by: SURGERY

## 2022-11-17 PROCEDURE — D9220A PRA ANESTHESIA: ICD-10-PCS | Mod: CRNA,,, | Performed by: NURSE ANESTHETIST, CERTIFIED REGISTERED

## 2022-11-17 PROCEDURE — D9220A PRA ANESTHESIA: Mod: CRNA,,, | Performed by: NURSE ANESTHETIST, CERTIFIED REGISTERED

## 2022-11-17 PROCEDURE — 35301 RECHANNELING OF ARTERY: CPT | Mod: LT,,, | Performed by: SURGERY

## 2022-11-17 PROCEDURE — 25000242 PHARM REV CODE 250 ALT 637 W/ HCPCS

## 2022-11-17 PROCEDURE — 36620 INSERTION CATHETER ARTERY: CPT | Mod: 59,,, | Performed by: ANESTHESIOLOGY

## 2022-11-17 PROCEDURE — 37000008 HC ANESTHESIA 1ST 15 MINUTES: Performed by: SURGERY

## 2022-11-17 PROCEDURE — 71000033 HC RECOVERY, INTIAL HOUR: Performed by: SURGERY

## 2022-11-17 PROCEDURE — 25000003 PHARM REV CODE 250: Performed by: SURGERY

## 2022-11-17 PROCEDURE — 35301 PR THROMBOENDARTECTMY NECK,NECK INCIS: ICD-10-PCS | Mod: LT,,, | Performed by: SURGERY

## 2022-11-17 PROCEDURE — 37000009 HC ANESTHESIA EA ADD 15 MINS: Performed by: SURGERY

## 2022-11-17 PROCEDURE — 20600001 HC STEP DOWN PRIVATE ROOM

## 2022-11-17 PROCEDURE — 36000706: Performed by: SURGERY

## 2022-11-17 PROCEDURE — 36000707: Performed by: SURGERY

## 2022-11-17 PROCEDURE — 25000003 PHARM REV CODE 250: Performed by: NURSE ANESTHETIST, CERTIFIED REGISTERED

## 2022-11-17 PROCEDURE — 36620 PR INSERT CATH,ART,PERCUT,SHORTTERM: ICD-10-PCS | Mod: 59,,, | Performed by: ANESTHESIOLOGY

## 2022-11-17 DEVICE — GRAFT VAS GUARD 0.8X8CM BOVINE: Type: IMPLANTABLE DEVICE | Site: NECK | Status: FUNCTIONAL

## 2022-11-17 RX ORDER — PANTOPRAZOLE SODIUM 40 MG/1
40 TABLET, DELAYED RELEASE ORAL DAILY
Status: DISCONTINUED | OUTPATIENT
Start: 2022-11-18 | End: 2022-11-18 | Stop reason: HOSPADM

## 2022-11-17 RX ORDER — PROPOFOL 10 MG/ML
VIAL (ML) INTRAVENOUS
Status: DISCONTINUED | OUTPATIENT
Start: 2022-11-17 | End: 2022-11-17

## 2022-11-17 RX ORDER — HEPARIN SODIUM 1000 [USP'U]/ML
INJECTION, SOLUTION INTRAVENOUS; SUBCUTANEOUS
Status: DISCONTINUED | OUTPATIENT
Start: 2022-11-17 | End: 2022-11-17 | Stop reason: HOSPADM

## 2022-11-17 RX ORDER — CEFAZOLIN SODIUM/D5W 2 G/100 ML
2 PLASTIC BAG, INJECTION (ML) INTRAVENOUS
Status: DISCONTINUED | OUTPATIENT
Start: 2022-11-17 | End: 2022-11-18 | Stop reason: HOSPADM

## 2022-11-17 RX ORDER — ROCURONIUM BROMIDE 10 MG/ML
INJECTION, SOLUTION INTRAVENOUS
Status: DISCONTINUED | OUTPATIENT
Start: 2022-11-17 | End: 2022-11-17

## 2022-11-17 RX ORDER — GABAPENTIN 300 MG/1
300 CAPSULE ORAL 2 TIMES DAILY
Status: DISCONTINUED | OUTPATIENT
Start: 2022-11-17 | End: 2022-11-18 | Stop reason: HOSPADM

## 2022-11-17 RX ORDER — DEXMEDETOMIDINE HYDROCHLORIDE 100 UG/ML
INJECTION, SOLUTION INTRAVENOUS
Status: DISCONTINUED | OUTPATIENT
Start: 2022-11-17 | End: 2022-11-17

## 2022-11-17 RX ORDER — ALBUTEROL SULFATE 2.5 MG/.5ML
2.5 SOLUTION RESPIRATORY (INHALATION) EVERY 4 HOURS
Status: DISCONTINUED | OUTPATIENT
Start: 2022-11-17 | End: 2022-11-18 | Stop reason: HOSPADM

## 2022-11-17 RX ORDER — ONDANSETRON 2 MG/ML
INJECTION INTRAMUSCULAR; INTRAVENOUS
Status: DISCONTINUED | OUTPATIENT
Start: 2022-11-17 | End: 2022-11-17

## 2022-11-17 RX ORDER — SODIUM CHLORIDE 9 MG/ML
INJECTION, SOLUTION INTRAVENOUS CONTINUOUS
Status: DISCONTINUED | OUTPATIENT
Start: 2022-11-17 | End: 2022-11-17

## 2022-11-17 RX ORDER — HYDROMORPHONE HYDROCHLORIDE 1 MG/ML
0.2 INJECTION, SOLUTION INTRAMUSCULAR; INTRAVENOUS; SUBCUTANEOUS EVERY 5 MIN PRN
Status: DISPENSED | OUTPATIENT
Start: 2022-11-17

## 2022-11-17 RX ORDER — MONTELUKAST SODIUM 10 MG/1
10 TABLET ORAL NIGHTLY
Status: DISCONTINUED | OUTPATIENT
Start: 2022-11-17 | End: 2022-11-18 | Stop reason: HOSPADM

## 2022-11-17 RX ORDER — LIDOCAINE HYDROCHLORIDE 20 MG/ML
INJECTION, SOLUTION EPIDURAL; INFILTRATION; INTRACAUDAL; PERINEURAL
Status: DISCONTINUED | OUTPATIENT
Start: 2022-11-17 | End: 2022-11-17

## 2022-11-17 RX ORDER — DEXAMETHASONE SODIUM PHOSPHATE 4 MG/ML
INJECTION, SOLUTION INTRA-ARTICULAR; INTRALESIONAL; INTRAMUSCULAR; INTRAVENOUS; SOFT TISSUE
Status: DISCONTINUED | OUTPATIENT
Start: 2022-11-17 | End: 2022-11-17

## 2022-11-17 RX ORDER — ATORVASTATIN CALCIUM 40 MG/1
80 TABLET, FILM COATED ORAL DAILY
Status: DISCONTINUED | OUTPATIENT
Start: 2022-11-18 | End: 2022-11-18 | Stop reason: HOSPADM

## 2022-11-17 RX ORDER — PHENYLEPHRINE HYDROCHLORIDE 10 MG/ML
INJECTION INTRAVENOUS CONTINUOUS PRN
Status: DISCONTINUED | OUTPATIENT
Start: 2022-11-17 | End: 2022-11-17

## 2022-11-17 RX ORDER — FENTANYL CITRATE 50 UG/ML
INJECTION, SOLUTION INTRAMUSCULAR; INTRAVENOUS
Status: DISCONTINUED | OUTPATIENT
Start: 2022-11-17 | End: 2022-11-17

## 2022-11-17 RX ORDER — HEPARIN SODIUM 1000 [USP'U]/ML
INJECTION, SOLUTION INTRAVENOUS; SUBCUTANEOUS
Status: DISCONTINUED | OUTPATIENT
Start: 2022-11-17 | End: 2022-11-17

## 2022-11-17 RX ORDER — FENTANYL CITRATE 50 UG/ML
25 INJECTION, SOLUTION INTRAMUSCULAR; INTRAVENOUS EVERY 5 MIN PRN
Status: DISCONTINUED | OUTPATIENT
Start: 2022-11-17 | End: 2022-11-17

## 2022-11-17 RX ORDER — DIPHENHYDRAMINE HYDROCHLORIDE 50 MG/ML
25 INJECTION INTRAMUSCULAR; INTRAVENOUS EVERY 6 HOURS PRN
Status: ACTIVE | OUTPATIENT
Start: 2022-11-17

## 2022-11-17 RX ORDER — LABETALOL HYDROCHLORIDE 5 MG/ML
INJECTION, SOLUTION INTRAVENOUS
Status: DISCONTINUED | OUTPATIENT
Start: 2022-11-17 | End: 2022-11-17

## 2022-11-17 RX ORDER — LABETALOL HCL 20 MG/4 ML
10 SYRINGE (ML) INTRAVENOUS EVERY 4 HOURS PRN
Status: DISCONTINUED | OUTPATIENT
Start: 2022-11-17 | End: 2022-11-18 | Stop reason: HOSPADM

## 2022-11-17 RX ORDER — NAPROXEN SODIUM 220 MG/1
81 TABLET, FILM COATED ORAL DAILY
Status: DISCONTINUED | OUTPATIENT
Start: 2022-11-18 | End: 2022-11-18 | Stop reason: HOSPADM

## 2022-11-17 RX ORDER — IBUPROFEN 200 MG
1 TABLET ORAL DAILY
Status: DISCONTINUED | OUTPATIENT
Start: 2022-11-18 | End: 2022-11-18 | Stop reason: HOSPADM

## 2022-11-17 RX ORDER — PHENYLEPHRINE HCL IN 0.9% NACL 1 MG/10 ML
SYRINGE (ML) INTRAVENOUS
Status: DISCONTINUED | OUTPATIENT
Start: 2022-11-17 | End: 2022-11-17

## 2022-11-17 RX ORDER — SODIUM CHLORIDE 0.9 % (FLUSH) 0.9 %
10 SYRINGE (ML) INJECTION
Status: ACTIVE | OUTPATIENT
Start: 2022-11-17

## 2022-11-17 RX ORDER — LIDOCAINE HYDROCHLORIDE 40 MG/ML
SOLUTION TOPICAL
Status: DISCONTINUED | OUTPATIENT
Start: 2022-11-17 | End: 2022-11-17

## 2022-11-17 RX ORDER — MIDAZOLAM HYDROCHLORIDE 1 MG/ML
INJECTION, SOLUTION INTRAMUSCULAR; INTRAVENOUS
Status: DISCONTINUED | OUTPATIENT
Start: 2022-11-17 | End: 2022-11-17

## 2022-11-17 RX ORDER — REMIFENTANIL HYDROCHLORIDE 1 MG/ML
INJECTION, POWDER, LYOPHILIZED, FOR SOLUTION INTRAVENOUS
Status: DISCONTINUED | OUTPATIENT
Start: 2022-11-17 | End: 2022-11-17

## 2022-11-17 RX ORDER — PROTAMINE SULFATE 10 MG/ML
INJECTION, SOLUTION INTRAVENOUS
Status: DISCONTINUED | OUTPATIENT
Start: 2022-11-17 | End: 2022-11-17

## 2022-11-17 RX ORDER — ALBUTEROL SULFATE 90 UG/1
AEROSOL, METERED RESPIRATORY (INHALATION)
Status: DISCONTINUED | OUTPATIENT
Start: 2022-11-17 | End: 2022-11-17

## 2022-11-17 RX ORDER — BUPROPION HYDROCHLORIDE 150 MG/1
150 TABLET ORAL DAILY
Status: DISCONTINUED | OUTPATIENT
Start: 2022-11-18 | End: 2022-11-18 | Stop reason: HOSPADM

## 2022-11-17 RX ORDER — PROCHLORPERAZINE EDISYLATE 5 MG/ML
5 INJECTION INTRAMUSCULAR; INTRAVENOUS EVERY 30 MIN PRN
Status: ACTIVE | OUTPATIENT
Start: 2022-11-17

## 2022-11-17 RX ORDER — ACETAMINOPHEN 10 MG/ML
INJECTION, SOLUTION INTRAVENOUS
Status: DISCONTINUED | OUTPATIENT
Start: 2022-11-17 | End: 2022-11-17

## 2022-11-17 RX ADMIN — FENTANYL CITRATE 25 MCG: 50 INJECTION, SOLUTION INTRAMUSCULAR; INTRAVENOUS at 05:11

## 2022-11-17 RX ADMIN — HEPARIN SODIUM 2000 UNITS: 1000 INJECTION, SOLUTION INTRAVENOUS; SUBCUTANEOUS at 03:11

## 2022-11-17 RX ADMIN — HEPARIN SODIUM 5000 UNITS: 1000 INJECTION, SOLUTION INTRAVENOUS; SUBCUTANEOUS at 02:11

## 2022-11-17 RX ADMIN — LIDOCAINE HYDROCHLORIDE 4 ML: 40 SOLUTION TOPICAL at 04:11

## 2022-11-17 RX ADMIN — PROTAMINE SULFATE 10 MG: 10 INJECTION, SOLUTION INTRAVENOUS at 04:11

## 2022-11-17 RX ADMIN — SODIUM CHLORIDE: 0.9 INJECTION, SOLUTION INTRAVENOUS at 12:11

## 2022-11-17 RX ADMIN — SODIUM CHLORIDE, SODIUM GLUCONATE, SODIUM ACETATE, POTASSIUM CHLORIDE, MAGNESIUM CHLORIDE, SODIUM PHOSPHATE, DIBASIC, AND POTASSIUM PHOSPHATE: .53; .5; .37; .037; .03; .012; .00082 INJECTION, SOLUTION INTRAVENOUS at 01:11

## 2022-11-17 RX ADMIN — FENTANYL CITRATE 25 MCG: 50 INJECTION, SOLUTION INTRAMUSCULAR; INTRAVENOUS at 04:11

## 2022-11-17 RX ADMIN — HYDROMORPHONE HYDROCHLORIDE 0.2 MG: 1 INJECTION, SOLUTION INTRAMUSCULAR; INTRAVENOUS; SUBCUTANEOUS at 10:11

## 2022-11-17 RX ADMIN — Medication 3 G: at 01:11

## 2022-11-17 RX ADMIN — Medication 100 MCG: at 01:11

## 2022-11-17 RX ADMIN — LABETALOL HYDROCHLORIDE 10 MG: 5 INJECTION, SOLUTION INTRAVENOUS at 07:11

## 2022-11-17 RX ADMIN — LABETALOL HYDROCHLORIDE 5 MG: 5 INJECTION, SOLUTION INTRAVENOUS at 05:11

## 2022-11-17 RX ADMIN — ESMOLOL HYDROCHLORIDE 20 MG: 100 INJECTION, SOLUTION INTRAVENOUS at 04:11

## 2022-11-17 RX ADMIN — ALBUTEROL SULFATE 2 PUFF: 108 INHALANT RESPIRATORY (INHALATION) at 05:11

## 2022-11-17 RX ADMIN — PHENYLEPHRINE HYDROCHLORIDE 0.6 MCG/KG/MIN: 10 INJECTION INTRAVENOUS at 01:11

## 2022-11-17 RX ADMIN — ROCURONIUM BROMIDE 10 MG: 10 INJECTION INTRAVENOUS at 04:11

## 2022-11-17 RX ADMIN — REMIFENTANIL HYDROCHLORIDE 0.05 MCG/KG/MIN: 1 INJECTION, POWDER, LYOPHILIZED, FOR SOLUTION INTRAVENOUS at 01:11

## 2022-11-17 RX ADMIN — LIDOCAINE HYDROCHLORIDE 80 MG: 20 INJECTION, SOLUTION EPIDURAL; INFILTRATION; INTRACAUDAL; PERINEURAL at 01:11

## 2022-11-17 RX ADMIN — DEXMEDETOMIDINE HYDROCHLORIDE 8 MCG: 100 INJECTION, SOLUTION INTRAVENOUS at 05:11

## 2022-11-17 RX ADMIN — PROPOFOL 30 MG: 10 INJECTION, EMULSION INTRAVENOUS at 04:11

## 2022-11-17 RX ADMIN — Medication 200 MCG: at 01:11

## 2022-11-17 RX ADMIN — ROCURONIUM BROMIDE 20 MG: 10 INJECTION INTRAVENOUS at 02:11

## 2022-11-17 RX ADMIN — PROPOFOL 150 MG: 10 INJECTION, EMULSION INTRAVENOUS at 01:11

## 2022-11-17 RX ADMIN — Medication 100 MCG: at 04:11

## 2022-11-17 RX ADMIN — DEXAMETHASONE SODIUM PHOSPHATE 4 MG: 4 INJECTION, SOLUTION INTRAMUSCULAR; INTRAVENOUS at 01:11

## 2022-11-17 RX ADMIN — ROCURONIUM BROMIDE 40 MG: 10 INJECTION INTRAVENOUS at 01:11

## 2022-11-17 RX ADMIN — HYDROMORPHONE HYDROCHLORIDE 0.2 MG: 1 INJECTION, SOLUTION INTRAMUSCULAR; INTRAVENOUS; SUBCUTANEOUS at 09:11

## 2022-11-17 RX ADMIN — SUGAMMADEX 200 MG: 100 INJECTION, SOLUTION INTRAVENOUS at 04:11

## 2022-11-17 RX ADMIN — ONDANSETRON 4 MG: 2 INJECTION INTRAMUSCULAR; INTRAVENOUS at 04:11

## 2022-11-17 RX ADMIN — ESMOLOL HYDROCHLORIDE 20 MG: 100 INJECTION, SOLUTION INTRAVENOUS at 05:11

## 2022-11-17 RX ADMIN — DEXMEDETOMIDINE HYDROCHLORIDE 8 MCG: 100 INJECTION, SOLUTION INTRAVENOUS at 04:11

## 2022-11-17 RX ADMIN — ROCURONIUM BROMIDE 50 MG: 10 INJECTION INTRAVENOUS at 01:11

## 2022-11-17 RX ADMIN — GLYCOPYRROLATE 0.4 MG: 0.2 INJECTION, SOLUTION INTRAMUSCULAR; INTRAVENOUS at 02:11

## 2022-11-17 RX ADMIN — FENTANYL CITRATE 25 MCG: 50 INJECTION, SOLUTION INTRAMUSCULAR; INTRAVENOUS at 03:11

## 2022-11-17 RX ADMIN — FENTANYL CITRATE 25 MCG: 50 INJECTION, SOLUTION INTRAMUSCULAR; INTRAVENOUS at 01:11

## 2022-11-17 RX ADMIN — ROCURONIUM BROMIDE 10 MG: 10 INJECTION INTRAVENOUS at 03:11

## 2022-11-17 RX ADMIN — FENTANYL CITRATE 25 MCG: 50 INJECTION, SOLUTION INTRAMUSCULAR; INTRAVENOUS at 07:11

## 2022-11-17 RX ADMIN — ALBUTEROL SULFATE 2.5 MG: 2.5 SOLUTION RESPIRATORY (INHALATION) at 07:11

## 2022-11-17 RX ADMIN — Medication 200 MCG: at 02:11

## 2022-11-17 RX ADMIN — MIDAZOLAM HYDROCHLORIDE 2 MG: 1 INJECTION, SOLUTION INTRAMUSCULAR; INTRAVENOUS at 12:11

## 2022-11-17 RX ADMIN — ACETAMINOPHEN 1000 MG: 10 INJECTION INTRAVENOUS at 04:11

## 2022-11-17 NOTE — PLAN OF CARE
Pt prepared for surgery per orders. Wife at bedside and to take belongings while he is in surgery. Anesthesia consent needed for procedure today.

## 2022-11-17 NOTE — H&P
The patient has been examined and the H&P has been reviewed:    I concur with the findings and no changes have occurred since H&P was written.    Surgery risks, benefits and alternative options discussed and understood by patient/family.    Sharee Hernández MD  General Surgery   PGY-1      [facility] - Neurosurgery ([facility])  Vascular Surgery  Consult Note     Inpatient consult to Vascular Surgery  Consult performed by: Josette Shaw MD  Consult ordered by: Joy Khalil MD        Subjective:      Chief Complaint/Reason for Admission: TIA     History of Present Illness: 57 year old man with HTN, HLD, CAD s/p MI, HFpEF(most recent echo 11/08/22), COPD, smoking, obesity who initially presented to [facility] 11/8 with TIA (RSW and numbness, aphasia). CTA showed no acute abnormality with 80% stenosis of R carotid bifurcation. MRI with no acute process. Carotid US showed >70% stenosis of L ICA and 50-69% stenosis of R ICA. [place] [facility] [place] consult.                Medications Prior to Admission   Medication Sig Dispense Refill Last Dose    ALBUTEROL INHL Inhale into the lungs.          buPROPion (WELLBUTRIN XL) 150 MG TB24 tablet Take 150 mg by mouth once daily.          gabapentin (NEURONTIN) 300 MG capsule Take 300 mg by mouth 2 (two) times daily.          montelukast (SINGULAIR) 10 mg tablet Take 10 mg by mouth every evening.          pantoprazole (PROTONIX) 40 MG tablet Take 40 mg by mouth once daily.          [DISCONTINUED] lisinopriL-hydrochlorothiazide (PRINZIDE,ZESTORETIC) 20-25 mg Tab Take 1 tablet by mouth once daily.          [DISCONTINUED] sertraline (ZOLOFT) 50 MG tablet Take 50 mg by mouth every evening.          [DISCONTINUED] simvastatin (ZOCOR) 20 MG tablet Take 1 tablet by mouth once daily.                     Review of patient's allergies indicates:   Allergen Reactions    Maple flavor Other (See Comments)       Throat swelling                 Past Medical History:   Diagnosis Date     CHF (congestive heart failure)      COPD (chronic obstructive pulmonary disease)      HLD (hyperlipidemia)      Hypertension      MI, old              Past Surgical History:   Procedure Laterality Date    LAPAROSCOPIC CHOLECYSTECTOMY N/A 4/11/2022     Procedure: CHOLECYSTECTOMY, LAPAROSCOPIC;  Surgeon: Neville Hutchinson MD;  Location: [facility];  Service: General;  Laterality: N/A;    TONSILLECTOMY          Family History         Problem Relation (Age of Onset)     Hypertension Father                   Tobacco Use    Smoking status: Every Day       Packs/day: 1.00       Years: 30.00       Pack years: 30.00       Types: Cigarettes    Smokeless tobacco: Not on file   Substance and Sexual Activity    Alcohol use: Yes       Comment: a little    Drug use: Yes       Types: Marijuana       Comment: 3 to 4 times a week    Sexual activity: Yes       Partners: Female      Review of Systems   Constitutional:  Negative for activity change.   Neurological: Negative.  Negative for seizures, syncope, speech difficulty and weakness.        Asymptomatic at time of encounter   All other systems reviewed and are negative.  Objective:      Vital Signs (Most Recent):  Temp: 98.3 °F (36.8 °C) (11/11/22 1214)  Pulse: 71 (11/11/22 1214)  Resp: 19 (11/11/22 1214)  BP: (!) 168/87 (11/11/22 1214)  SpO2: (!) 93 % (11/11/22 1214)    Vital Signs (24h Range):  Temp:  [96.6 °F (35.9 °C)-98.7 °F (37.1 °C)] 98.3 °F (36.8 °C)  Pulse:  [61-97] 71  Resp:  [16-20] 19  SpO2:  [93 %-97 %] 93 %  BP: (120-188)/(60-90) 168/87      Weight: (!) 140.2 kg (309 lb)  Body mass index is 44.98 kg/m².     Physical Exam  Vitals and nursing note reviewed.   Constitutional:       Appearance: Normal appearance.   HENT:      Head: Normocephalic and atraumatic.   Cardiovascular:      Rate and Rhythm: Normal rate and regular rhythm.   Pulmonary:      Effort: Pulmonary effort is normal. No respiratory distress.   Abdominal:      General: Abdomen is flat. There is no  distension.      Palpations: Abdomen is soft. There is no mass.      Tenderness: There is no abdominal tenderness.      Hernia: No hernia is present.   Musculoskeletal:      Right lower leg: No edema.      Left lower leg: No edema.   Skin:     General: Skin is warm and dry.   Neurological:      General: No focal deficit present.      Mental Status: He is alert and oriented to person, place, and time.      Cranial Nerves: No cranial nerve deficit.      Sensory: No sensory deficit.      Motor: No weakness.      Coordination: Coordination normal.   Psychiatric:         Mood and Affect: Mood normal.         Behavior: Behavior normal.         Significant Labs:  CBC:       Recent Labs   Lab 11/11/22 0453   WBC 8.32   RBC 4.87   HGB 15.7   HCT 48.0      MCV 99*   MCH 32.2*   MCHC 32.7      CMP:       Recent Labs   Lab 11/11/22 0453   GLU 87   CALCIUM 9.3   ALBUMIN 3.8   PROT 7.1      K 4.5   CO2 29   CL 99   BUN 25*   CREATININE 0.8   ALKPHOS 79   ALT 17   AST 17   BILITOT 1.5*         Significant Diagnostics:  I have reviewed all pertinent imaging results/findings within the past 24 hours.     Assessment/Plan:      * Transient ischemic attack  56 yo M with BCAS who presented after TIA with right sided symptoms, L ICA stenosis > 70%     - Plan for L CEA 11/15  - Consent obtained  - Okay to DC and return as outpatient  - Return sooner if symptoms return            Thank you for your consult. I will follow-up with patient. Please contact us if you have any additional questions.     Josette Shaw MD  Vascular Surgery  [facility] - Neurosurgery ([facility])

## 2022-11-17 NOTE — ANESTHESIA PROCEDURE NOTES
Intubation    Date/Time: 11/17/2022 1:04 PM  Performed by: Enoc Rosales MD  Authorized by: Matheus Miramontes MD     Intubation:     Induction:  Intravenous    Intubated:  Postinduction    Mask Ventilation:  Moderately difficult with oral airway    Attempts:  1    Attempted By:  Resident anesthesiologist    Method of Intubation:  Video laryngoscopy    Blade:  Chauhan 3    Laryngeal View Grade: Grade I - full view of cords      Difficult Airway Encountered?: No      Complications:  None    Airway Device:  Oral endotracheal tube    Airway Device Size:  7.5    Style/Cuff Inflation:  Cuffed (inflated to minimal occlusive pressure)    Tube secured:  23    Secured at:  The lips    Placement Verified By:  Capnometry    Complicating Factors:  Obesity, short neck and none    Findings Post-Intubation:  BS equal bilateral and atraumatic/condition of teeth unchanged

## 2022-11-17 NOTE — H&P
The patient has been examined and the H&P has been reviewed:     I concur with the findings and no changes have occurred since H&P was written.     Surgery risks, benefits and alternative options discussed and understood by patient/family.    Xavier Lowry MD  Vascular Surgery      [facility] - Neurosurgery ([facility])  Vascular Surgery  Consult Note     Inpatient consult to Vascular Surgery  Consult performed by: Josette Shaw MD  Consult ordered by: Joy Khalil MD        Subjective:      Chief Complaint/Reason for Admission: TIA     History of Present Illness: 57 year old man with HTN, HLD, CAD s/p MI, HFpEF(most recent echo 11/08/22), COPD, smoking, obesity who initially presented to [facility] 11/8 with TIA (RSW and numbness, aphasia). CTA showed no acute abnormality with 80% stenosis of R carotid bifurcation. MRI with no acute process. Carotid US showed >70% stenosis of L ICA and 50-69% stenosis of R ICA. Transferred to [facility] for Vascular Surgery consult.                Medications Prior to Admission   Medication Sig Dispense Refill Last Dose    ALBUTEROL INHL Inhale into the lungs.          buPROPion (WELLBUTRIN XL) 150 MG TB24 tablet Take 150 mg by mouth once daily.          gabapentin (NEURONTIN) 300 MG capsule Take 300 mg by mouth 2 (two) times daily.          montelukast (SINGULAIR) 10 mg tablet Take 10 mg by mouth every evening.          pantoprazole (PROTONIX) 40 MG tablet Take 40 mg by mouth once daily.          [DISCONTINUED] lisinopriL-hydrochlorothiazide (PRINZIDE,ZESTORETIC) 20-25 mg Tab Take 1 tablet by mouth once daily.          [DISCONTINUED] sertraline (ZOLOFT) 50 MG tablet Take 50 mg by mouth every evening.          [DISCONTINUED] simvastatin (ZOCOR) 20 MG tablet Take 1 tablet by mouth once daily.                     Review of patient's allergies indicates:   Allergen Reactions    Maple flavor Other (See Comments)       Throat swelling                 Past Medical History:   Diagnosis Date     CHF (congestive heart failure)      COPD (chronic obstructive pulmonary disease)      HLD (hyperlipidemia)      Hypertension      MI, old              Past Surgical History:   Procedure Laterality Date    LAPAROSCOPIC CHOLECYSTECTOMY N/A 4/11/2022     Procedure: CHOLECYSTECTOMY, LAPAROSCOPIC;  Surgeon: Neville Hutchinson MD;  Location: [facility];  Service: General;  Laterality: N/A;    TONSILLECTOMY          Family History         Problem Relation (Age of Onset)     Hypertension Father                   Tobacco Use    Smoking status: Every Day       Packs/day: 1.00       Years: 30.00       Pack years: 30.00       Types: Cigarettes    Smokeless tobacco: Not on file   Substance and Sexual Activity    Alcohol use: Yes       Comment: a little    Drug use: Yes       Types: Marijuana       Comment: 3 to 4 times a week    Sexual activity: Yes       Partners: Female      Review of Systems   Constitutional:  Negative for activity change.   Neurological: Negative.  Negative for seizures, syncope, speech difficulty and weakness.        Asymptomatic at time of encounter   All other systems reviewed and are negative.  Objective:      Vital Signs (Most Recent):  Temp: 98.3 °F (36.8 °C) (11/11/22 1214)  Pulse: 71 (11/11/22 1214)  Resp: 19 (11/11/22 1214)  BP: (!) 168/87 (11/11/22 1214)  SpO2: (!) 93 % (11/11/22 1214)    Vital Signs (24h Range):  Temp:  [96.6 °F (35.9 °C)-98.7 °F (37.1 °C)] 98.3 °F (36.8 °C)  Pulse:  [61-97] 71  Resp:  [16-20] 19  SpO2:  [93 %-97 %] 93 %  BP: (120-188)/(60-90) 168/87      Weight: (!) 140.2 kg (309 lb)  Body mass index is 44.98 kg/m².     Physical Exam  Vitals and nursing note reviewed.   Constitutional:       Appearance: Normal appearance.   HENT:      Head: Normocephalic and atraumatic.   Cardiovascular:      Rate and Rhythm: Normal rate and regular rhythm.   Pulmonary:      Effort: Pulmonary effort is normal. No respiratory distress.   Abdominal:      General: Abdomen is flat. There is no  distension.      Palpations: Abdomen is soft. There is no mass.      Tenderness: There is no abdominal tenderness.      Hernia: No hernia is present.   Musculoskeletal:      Right lower leg: No edema.      Left lower leg: No edema.   Skin:     General: Skin is warm and dry.   Neurological:      General: No focal deficit present.      Mental Status: He is alert and oriented to person, place, and time.      Cranial Nerves: No cranial nerve deficit.      Sensory: No sensory deficit.      Motor: No weakness.      Coordination: Coordination normal.   Psychiatric:         Mood and Affect: Mood normal.         Behavior: Behavior normal.         Significant Labs:  CBC:       Recent Labs   Lab 11/11/22 0453   WBC 8.32   RBC 4.87   HGB 15.7   HCT 48.0      MCV 99*   MCH 32.2*   MCHC 32.7      CMP:       Recent Labs   Lab 11/11/22 0453   GLU 87   CALCIUM 9.3   ALBUMIN 3.8   PROT 7.1      K 4.5   CO2 29   CL 99   BUN 25*   CREATININE 0.8   ALKPHOS 79   ALT 17   AST 17   BILITOT 1.5*         Significant Diagnostics:  I have reviewed all pertinent imaging results/findings within the past 24 hours.     Assessment/Plan:      * Transient ischemic attack  56 yo M with BCAS who presented after TIA with right sided symptoms, L ICA stenosis > 70%     - Plan for L CEA 11/15  - Consent obtained  - Okay to DC and return as outpatient  - Return sooner if symptoms return            Thank you for your consult. I will follow-up with patient. Please contact us if you have any additional questions.     Josette Shaw MD  Vascular Surgery  [facility] - Neurosurgery ([facility])

## 2022-11-17 NOTE — HPI
Renard Jimenez is a 57 year old male with HTN, HLD, CAD s/p MI, HFpEF(most recent echo 11/08/22), COPD, smoking, obesity who initially presented to [facility] 11/8 with TIA (RSW and numbness, aphasia). CTA showed no acute abnormality with 80% stenosis of R carotid bifurcation. MRI with no acute process. Carotid US showed >70% stenosis of L ICA and 50-69% stenosis of R ICA.    During his visit at the [facility], he stated that his symptoms resolved after two hours, and he had no residual neurologic deficits. He presents to the SICU s/p left sided carotid endarterectomy.    Patient presents with *** access and benoit.

## 2022-11-17 NOTE — ANESTHESIA PROCEDURE NOTES
Arterial    Diagnosis: transient ischemic attack    Patient location during procedure: done in OR    Staffing  Authorizing Provider: Matheus Miramontes MD  Performing Provider: Enoc Rosales MD    Anesthesiologist was present at the time of the procedure.    Preanesthetic Checklist  Completed: patient identified, IV checked, site marked, risks and benefits discussed, surgical consent, monitors and equipment checked, pre-op evaluation, timeout performed and anesthesia consent givenArterial  Skin Prep: chlorhexidine gluconate  Local Infiltration: none  Orientation: left  Location: radial    Catheter Size: 20 G  Catheter placement by Ultrasound guidance. Heme positive aspiration all ports.   Vessel Caliber: small, patent, compressibility normal  Vascular Doppler:  not done  Needle advanced into vessel with real time Ultrasound guidance.Insertion Attempts: 1  Assessment  Dressing: secured with tape and tegaderm  Patient: Tolerated well

## 2022-11-17 NOTE — BRIEF OP NOTE
[facility] (Formerly [facility])  Brief Operative Note    SUMMARY     Surgery Date: 11/17/2022     Surgeon(s) and Role:     * ELIDA Guadalupe II, MD - Primary     * Josette Shaw MD - Resident - Assisting     * Sharee Hernández MD - Resident - Assisting     * Tone Cornejo MD - Fellow        Pre-op Diagnosis:  TIA (transient ischemic attack) [G45.9]    Post-op Diagnosis:  Post-Op Diagnosis Codes:     * TIA (transient ischemic attack) [G45.9]    Procedure(s) (LRB):  ENDARTERECTOMY-CAROTID (Left)    Anesthesia: General    Operative Findings: Ulcerated plaque in carotid bulb. High lesion and unfavorable anatomy requiring ligation of occipital artery to mobilize hypoglossal nerve and partial division of posterior belly of digastric muscle. [place] shunt used. Low resistance flow on doppler at conclusion of repair. Skin closed with staples. Normal neuro exam upon awakening.     Heparin: 16,000 units    Protamine: 50 mg     Estimated Blood Loss: 150 mL           Specimens:   Specimen (24h ago, onward)      None            [id number]

## 2022-11-18 VITALS
TEMPERATURE: 99 F | HEART RATE: 86 BPM | DIASTOLIC BLOOD PRESSURE: 68 MMHG | WEIGHT: 310.63 LBS | SYSTOLIC BLOOD PRESSURE: 150 MMHG | BODY MASS INDEX: 46.01 KG/M2 | HEIGHT: 69 IN | RESPIRATION RATE: 18 BRPM | OXYGEN SATURATION: 95 %

## 2022-11-18 PROBLEM — I65.22 SYMPTOMATIC CAROTID ARTERY STENOSIS, LEFT: Status: ACTIVE | Noted: 2022-11-09

## 2022-11-18 LAB
ALBUMIN SERPL BCP-MCNC: 3.9 G/DL (ref 3.5–5.2)
ALP SERPL-CCNC: 112 U/L (ref 55–135)
ALT SERPL W/O P-5'-P-CCNC: 146 U/L (ref 10–44)
ANION GAP SERPL CALC-SCNC: 9 MMOL/L (ref 8–16)
AST SERPL-CCNC: 104 U/L (ref 10–40)
BASOPHILS # BLD AUTO: 0.02 K/UL (ref 0–0.2)
BASOPHILS NFR BLD: 0.1 % (ref 0–1.9)
BILIRUB SERPL-MCNC: 1 MG/DL (ref 0.1–1)
BUN SERPL-MCNC: 19 MG/DL (ref 6–20)
CALCIUM SERPL-MCNC: 8.4 MG/DL (ref 8.7–10.5)
CHLORIDE SERPL-SCNC: 102 MMOL/L (ref 95–110)
CO2 SERPL-SCNC: 25 MMOL/L (ref 23–29)
CREAT SERPL-MCNC: 1 MG/DL (ref 0.5–1.4)
DIFFERENTIAL METHOD: ABNORMAL
EOSINOPHIL # BLD AUTO: 0 K/UL (ref 0–0.5)
EOSINOPHIL NFR BLD: 0 % (ref 0–8)
ERYTHROCYTE [DISTWIDTH] IN BLOOD BY AUTOMATED COUNT: 13.9 % (ref 11.5–14.5)
EST. GFR  (NO RACE VARIABLE): >60 ML/MIN/1.73 M^2
GLUCOSE SERPL-MCNC: 116 MG/DL (ref 70–110)
HCT VFR BLD AUTO: 47.4 % (ref 40–54)
HGB BLD-MCNC: 14.8 G/DL (ref 14–18)
IMM GRANULOCYTES # BLD AUTO: 0.04 K/UL (ref 0–0.04)
IMM GRANULOCYTES NFR BLD AUTO: 0.3 % (ref 0–0.5)
LYMPHOCYTES # BLD AUTO: 1.3 K/UL (ref 1–4.8)
LYMPHOCYTES NFR BLD: 8.5 % (ref 18–48)
MCH RBC QN AUTO: 31.5 PG (ref 27–31)
MCHC RBC AUTO-ENTMCNC: 31.2 G/DL (ref 32–36)
MCV RBC AUTO: 101 FL (ref 82–98)
MONOCYTES # BLD AUTO: 0.9 K/UL (ref 0.3–1)
MONOCYTES NFR BLD: 5.9 % (ref 4–15)
NEUTROPHILS # BLD AUTO: 12.9 K/UL (ref 1.8–7.7)
NEUTROPHILS NFR BLD: 85.2 % (ref 38–73)
NRBC BLD-RTO: 0 /100 WBC
PLATELET # BLD AUTO: 227 K/UL (ref 150–450)
PMV BLD AUTO: 10.8 FL (ref 9.2–12.9)
POTASSIUM SERPL-SCNC: 5.2 MMOL/L (ref 3.5–5.1)
PROT SERPL-MCNC: 7.6 G/DL (ref 6–8.4)
RBC # BLD AUTO: 4.7 M/UL (ref 4.6–6.2)
SODIUM SERPL-SCNC: 136 MMOL/L (ref 136–145)
WBC # BLD AUTO: 15.18 K/UL (ref 3.9–12.7)

## 2022-11-18 PROCEDURE — 94640 AIRWAY INHALATION TREATMENT: CPT

## 2022-11-18 PROCEDURE — 94761 N-INVAS EAR/PLS OXIMETRY MLT: CPT

## 2022-11-18 PROCEDURE — 85025 COMPLETE CBC W/AUTO DIFF WBC: CPT

## 2022-11-18 PROCEDURE — S4991 NICOTINE PATCH NONLEGEND: HCPCS

## 2022-11-18 PROCEDURE — 99900035 HC TECH TIME PER 15 MIN (STAT)

## 2022-11-18 PROCEDURE — 80053 COMPREHEN METABOLIC PANEL: CPT

## 2022-11-18 PROCEDURE — 25000242 PHARM REV CODE 250 ALT 637 W/ HCPCS

## 2022-11-18 PROCEDURE — 27000221 HC OXYGEN, UP TO 24 HOURS

## 2022-11-18 PROCEDURE — 36415 COLL VENOUS BLD VENIPUNCTURE: CPT

## 2022-11-18 PROCEDURE — 25000003 PHARM REV CODE 250

## 2022-11-18 RX ADMIN — ATORVASTATIN CALCIUM 80 MG: 40 TABLET, FILM COATED ORAL at 08:11

## 2022-11-18 RX ADMIN — BUPROPION HYDROCHLORIDE 150 MG: 150 TABLET, FILM COATED, EXTENDED RELEASE ORAL at 08:11

## 2022-11-18 RX ADMIN — ASPIRIN 81 MG: 81 TABLET, CHEWABLE ORAL at 08:11

## 2022-11-18 RX ADMIN — PANTOPRAZOLE SODIUM 40 MG: 40 TABLET, DELAYED RELEASE ORAL at 08:11

## 2022-11-18 RX ADMIN — GABAPENTIN 300 MG: 300 CAPSULE ORAL at 08:11

## 2022-11-18 RX ADMIN — GABAPENTIN 300 MG: 300 CAPSULE ORAL at 12:11

## 2022-11-18 RX ADMIN — ALBUTEROL SULFATE 2.5 MG: 2.5 SOLUTION RESPIRATORY (INHALATION) at 05:11

## 2022-11-18 RX ADMIN — Medication 1 PATCH: at 08:11

## 2022-11-18 RX ADMIN — ALBUTEROL SULFATE 2.5 MG: 2.5 SOLUTION RESPIRATORY (INHALATION) at 07:11

## 2022-11-18 RX ADMIN — MONTELUKAST 10 MG: 10 TABLET, FILM COATED ORAL at 12:11

## 2022-11-18 RX ADMIN — Medication 2 G: at 05:11

## 2022-11-18 NOTE — NURSING
Pt remain stable resting quietly, no further drainage noted from surgicals site,neurovascular checks WDL, safety    measures effective

## 2022-11-18 NOTE — PLAN OF CARE
[facility]  Discharge Final Note    Primary Care Provider: Juancho Ortiz MD    Expected Discharge Date: 11/18/2022    Pt discharged to home with no needs.  No assessment completed, pt discharged before  could complete assessment.     Final Discharge Note (most recent)       Final Note - 11/18/22 1024          Final Note    Assessment Type Final Discharge Note     Anticipated Discharge Disposition Home or Self Care        Post-Acute Status    Post-Acute Authorization Other     Other Status No Post-Acute Service Needs     Discharge Delays None known at this time                     Important Message from Medicare             Contact Radha Guadalupe II, MD   Specialty: Vascular Surgery    [facility] [facility] [address]   Phone: [phone]       Next Steps: Follow up in 4 week(s)          Tricia Drake LMSW  PRN-  [facility]  Ext. [phone]

## 2022-11-18 NOTE — SUBJECTIVE & OBJECTIVE
Medications:  Continuous Infusions:  Scheduled Meds:   albuterol sulfate  2.5 mg Nebulization Q4H    aspirin  81 mg Oral Daily    atorvastatin  80 mg Oral Daily    buPROPion  150 mg Oral Daily    ceFAZolin (ANCEF) IVPB  2 g Intravenous Q8H    gabapentin  300 mg Oral BID    montelukast  10 mg Oral QHS    nicotine  1 patch Transdermal Daily    pantoprazole  40 mg Oral Daily     PRN Meds:diphenhydrAMINE, HYDROmorphone, labetalol, prochlorperazine, sodium chloride 0.9%     Objective:     Vital Signs (Most Recent):  Temp: 99 °F (37.2 °C) (11/18/22 0711)  Pulse: 74 (11/18/22 0711)  Resp: 18 (11/18/22 0711)  BP: (!) 150/68 (11/18/22 0711)  SpO2: (!) 94 % (11/18/22 0711)   Vital Signs (24h Range):  Temp:  [97.3 °F (36.3 °C)-99 °F (37.2 °C)] 99 °F (37.2 °C)  Pulse:  [55-96] 74  Resp:  [16-38] 18  SpO2:  [91 %-100 %] 94 %  BP: (138-171)/(65-85) 150/68  Arterial Line BP: (107-162)/(65-86) 132/67         Physical Exam  Constitutional:       Appearance: Normal appearance.   HENT:      Head: Normocephalic and atraumatic.   Eyes:      Pupils: Pupils are equal, round, and reactive to light.   Cardiovascular:      Rate and Rhythm: Normal rate.   Pulmonary:      Effort: Pulmonary effort is normal.   Musculoskeletal:         General: Normal range of motion.      Cervical back: Normal range of motion.   Skin:     General: Skin is warm and dry.          Neurological:      General: No focal deficit present.      Mental Status: He is alert.      Cranial Nerves: No cranial nerve deficit.      Sensory: No sensory deficit.      Motor: No weakness.   Psychiatric:         Mood and Affect: Mood normal.         Behavior: Behavior normal.       Significant Labs:  CBC: No results for input(s): WBC, RBC, HGB, HCT, PLT, MCV, MCH, MCHC in the last 48 hours.  CMP: No results for input(s): GLU, CALCIUM, ALBUMIN, PROT, NA, K, CO2, CL, BUN, CREATININE, ALKPHOS, ALT, AST, BILITOT in the last 48 hours.    Significant Diagnostics:  I have reviewed all  pertinent imaging results/findings within the past 24 hours.

## 2022-11-18 NOTE — NURSING TRANSFER
Nursing Transfer Note      11/17/2022     Reason patient is being transferred: postop    Transfer To: [facility] 100 FROM PACU 18    Transfer via stretcher    Transfer with 3L to O2, cardiac monitoring    Transported by PCT    Medicines sent: N/A    Any special needs or follow-up needed: NO    Chart send with patient: Yes    Notified: spouse    Patient reassessed at: 11/17/2022 8321 (date, time)

## 2022-11-18 NOTE — HOSPITAL COURSE
Renard Jimenez presented to [facility] on the morning of 11/17/2022 for Left CEA. The procedure was performed without complication and he was transferred to the floor for further post op care and monitoring. Their postoperative course was uneventful and progressed according to plan.  His pain was controlled with a combination of IV and PO narcotic pain medications. His diet has been advanced throughout the hospital stay. He is now ambulating without assistance, tolerating a regular diet, pain is well controlled with PO pain medication, and is voiding appropriately. Patient now meets all criteria for discharge.

## 2022-11-18 NOTE — ANESTHESIA POSTPROCEDURE EVALUATION
Anesthesia Post Evaluation    Patient: Renard Jimenez    Procedure(s) Performed: Procedure(s) (LRB):  ENDARTERECTOMY-CAROTID (Left)    Final Anesthesia Type: general      Patient location during evaluation: PACU  Patient participation: Yes- Able to Participate  Level of consciousness: awake and alert  Post-procedure vital signs: reviewed and stable  Pain management: adequate  Airway patency: patent    PONV status at discharge: No PONV  Anesthetic complications: no      Cardiovascular status: blood pressure returned to baseline  Respiratory status: unassisted  Hydration status: euvolemic  Follow-up not needed.          Vitals Value Taken Time   /67 11/17/22 1807   Temp 36.7 °C (98.1 °F) 11/17/22 1807   Pulse 91 11/17/22 1939   Resp 21 11/17/22 1939   SpO2 93 % 11/17/22 1939   Vitals shown include unvalidated device data.      Event Time   Out of Recovery 18:15:00         Pain/Christiano Score: Pain Rating Prior to Med Admin: 10 (11/17/2022  7:29 PM)  Christiano Score: 9 (11/17/2022  6:15 PM)

## 2022-11-18 NOTE — PLAN OF CARE
Problem: Adult Inpatient Plan of Care  Goal: Plan of Care Review  Outcome: Adequate for Care Transition  Goal: Patient-Specific Goal (Individualized)  Outcome: Adequate for Care Transition  Goal: Absence of Hospital-Acquired Illness or Injury  Outcome: Adequate for Care Transition  Goal: Optimal Comfort and Wellbeing  Outcome: Adequate for Care Transition  Goal: Readiness for Transition of Care  Outcome: Adequate for Care Transition     Problem: Fluid and Electrolyte Imbalance (Acute Kidney Injury/Impairment)  Goal: Fluid and Electrolyte Balance  Outcome: Adequate for Care Transition     Problem: Oral Intake Inadequate (Acute Kidney Injury/Impairment)  Goal: Optimal Nutrition Intake  Outcome: Adequate for Care Transition     Problem: Renal Function Impairment (Acute Kidney Injury/Impairment)  Goal: Effective Renal Function  Outcome: Adequate for Care Transition     Problem: Bariatric Environmental Safety  Goal: Safety Maintained with Care  Outcome: Adequate for Care Transition

## 2022-11-18 NOTE — DISCHARGE SUMMARY
[facility]  Vascular Surgery  Discharge Summary      Patient Name: Renard Jimenez  MRN: [id number]  Admission Date: 11/17/2022  Hospital Length of Stay: 1 days  Discharge Date and Time:  11/18/2022 7:29 AM  Attending Physician: ELIDA Guadalupe II, MD   Discharging Provider: Xavier Lowry MD  Primary Care Provider: Juancho Ortiz MD    HPI:    57 year old man with HTN, HLD, CAD s/p MI, HFpEF(most recent echo 11/08/22), COPD, smoking, obesity who initially presented to [facility] 11/8 with TIA (RSW and numbness, aphasia). CTA showed no acute abnormality with 80% stenosis of R carotid bifurcation. MRI with no acute process. Carotid US showed >70% stenosis of L ICA and 50-69% stenosis of R ICA. Transfer[place] [facility] [place] consult. He presents today for L CEA. He does not have residual right sided deficits from his TIA.      Procedure(s) (LRB):  ENDARTERECTOMY-CAROTID (Left)     Hospital Course: Renard Jimenez presented to [facility] [place] the morning of 11/17/2022 for Left CEA. The procedure was performed without complication and he was transferred to the floor for further post op care and monitoring. Their postoperative course was uneventful and progressed according to plan.  His pain was controlled with a combination of IV and PO narcotic pain medications. His diet has been advanced throughout the hospital stay. He is now ambulating without assistance, tolerating a regular diet, pain is well controlled with PO pain medication, and is voiding appropriately. Patient now meets all criteria for discharge.        Goals of Care Treatment Preferences:  Code Status: Full Code      Consults:     Significant Diagnostic Studies: Labs: All labs within the past 24 hours have been reviewed    Pending Diagnostic Studies:     Procedure Component Value Units Date/Time    CBC auto differential [[id number]] Collected: 11/18/22 0703    Order Status: Sent Lab Status: In process Updated: 11/18/22 0711    Specimen: Blood      Comprehensive metabolic panel [[id number]] Collected: 11/18/22 0619    Order Status: Sent Lab Status: In process Updated: 11/18/22 0655    Specimen: Blood         There are no hospital problems to display for this patient.     Discharged Condition: good    Disposition: home    Follow Up:   Follow-up Information     ELIDA Guadalupe II, MD Follow up in 4 week(s).    Specialty: Vascular Surgery  Contact information:  [address] [facility] [address]  [phone]                         Patient Instructions:   VASCULAR SURGERY DISCHARGE INSTRUCTIONS    Woundcare:  - Take your dressing off 2 days after your surgery and take a shower. Shower daily and pad your incision site dry  - Leave sutures in place  - It is normal to notice some bruising at your incision site in the first 1-3 days after surgery    Activity:  - Activity is good for you. Try to walk frequently and increase walking distance every day  - No heavy lifting for 2 weeks  - No baths, swimming in pools, lakes, jacuzzi etc. for 2 weeks     Diet:  -Resume your pre-operative home diet    Follow up:  -Follow up for ultrasound and vascular surgery clinic appointment in ~2 weeks    Call Vascular Surgery Office at [phone] if you experience:  -Increased redness, warmth, tenderness, or draining pus from your incision  -Increased pain/swelling at your incision  -Worsening fevers, chills, nausea/vomiting  -Pain, weakness, coldness, or numbness in your legs  -Uncontrolled pain  -Your call will be returned within 24 hours and further instructions will be provided    Go to ER/Urgent Care if you experience:  -Worsening shortness of breath or chest pain  -Sudden severe pain and swelling at your incision site     Medications:  Reconciled Home Medications:      Medication List      CHANGE how you take these medications    clopidogreL 75 mg tablet  Commonly known as: PLAVIX  Take 1 tablet (75 mg total) by mouth once daily.  What changed: when to take this         CONTINUE taking these medications    * albuterol 90 mcg/actuation inhaler  Commonly known as: PROVENTIL/VENTOLIN HFA  Inhale 2 puffs into the lungs every 6 (six) hours as needed.     * albuterol 2.5 mg /3 mL (0.083 %) nebulizer solution  Commonly known as: PROVENTIL  Take 2.5 mg by nebulization every 4 (four) hours as needed.     aspirin 81 MG Chew  Take 1 tablet (81 mg total) by mouth once daily.     atorvastatin 80 MG tablet  Commonly known as: LIPITOR  Take 1 tablet (80 mg total) by mouth once daily.     fluticasone furoate-vilanteroL 200-25 mcg/dose Dsdv diskus inhaler  Commonly known as: BREO  Breo Ellipta 200 mcg-25 mcg/dose powder for inhalation   Inhale by inhalation route for 30 days.     gabapentin 300 MG capsule  Commonly known as: NEURONTIN  Take 300 mg by mouth 2 (two) times daily.     lisinopriL 5 MG tablet  Commonly known as: PRINIVIL,ZESTRIL  Take 1 tablet (5 mg total) by mouth once daily. Take 5mg daily until after your carotid artery procedure. The dose may increase back to your previous dose afterwards, but check with your healthcare provider.     montelukast 10 mg tablet  Commonly known as: SINGULAIR  Take 10 mg by mouth every evening.     nicotine 21 mg/24 hr  Commonly known as: NICODERM CQ  Place 1 patch onto the skin once daily.         * This list has 2 medication(s) that are the same as other medications prescribed for you. Read the directions carefully, and ask your doctor or other care provider to review them with you.            ASK your doctor about these medications    buPROPion 150 MG TB24 tablet  Commonly known as: WELLBUTRIN XL  Take 150 mg by mouth once daily.     diclofenac 75 MG EC tablet  Commonly known as: VOLTAREN  75 mg.     lisinopriL-hydrochlorothiazide 20-25 mg Tab  Commonly known as: PRINZIDE,ZESTORETIC  lisinopril 20 mg-hydrochlorothiazide 25 mg tablet   TAKE ONE TABLET BY MOUTH EVERY DAY     pantoprazole 40 MG tablet  Commonly known as: PROTONIX  Take 40 mg by mouth  once daily.            Xavier Lowry MD  Vascular Surgery  [facility]

## 2022-11-18 NOTE — PROGRESS NOTES
Upon arrival to PACU left neck dressing with moderate amount of sanguineous drainage. Site with swelling. MD Edgar at bedside and ordered RN to change dressing once saturated. Notify MD if need dressing changes x2. Continuing to monitor patient closely.

## 2022-11-18 NOTE — TRANSFER OF CARE
Anesthesia Transfer of Care Note    Patient: Renard Jimenez    Procedure(s) Performed: Procedure(s) (LRB):  ENDARTERECTOMY-CAROTID (Left)    Patient location: PACU    Anesthesia Type: general    Transport from OR: Transported from OR on 6-10 L/min O2 by face mask with adequate spontaneous ventilation    Post pain: adequate analgesia    Post assessment: no apparent anesthetic complications    Post vital signs: stable    Level of consciousness: responds to stimulation and sedated    Nausea/Vomiting: no nausea/vomiting    Complications: none    Transfer of care protocol was followed      Last vitals:   Visit Vitals  /67 (BP Location: Left arm, Patient Position: Lying)   Pulse 66   Temp 36.7 °C (98.1 °F) (Temporal)   Resp 20   SpO2 99%

## 2022-11-18 NOTE — DISCHARGE INSTRUCTIONS
VASCULAR SURGERY DISCHARGE INSTRUCTIONS    Woundcare:  - Take your dressing off 2 days after your surgery and take a shower. Shower daily and pad your incision site dry  - Leave sutures in place  - It is normal to notice some bruising at your incision site in the first 1-3 days after surgery    Activity:  - Activity is good for you. Try to walk frequently and increase walking distance every day  - No heavy lifting for 2 weeks  - No baths, swimming in pools, lakes, [employer] etc. for 2 weeks     Diet:  -Resume your pre-operative home diet    Follow up:  -Follow up for ultrasound and vascular surgery clinic appointment in ~2 weeks    Call Vascular Surgery Office at [phone] if you experience:  -Increased redness, warmth, tenderness, or draining pus from your incision  -Increased pain/swelling at your incision  -Worsening fevers, chills, nausea/vomiting  -Pain, weakness, coldness, or numbness in your legs  -Uncontrolled pain  -Your call will be returned within 24 hours and further instructions will be provided    Go to ER/Urgent Care if you experience:  -Worsening shortness of breath or chest pain  -Sudden severe pain and swelling at your incision site

## 2022-11-18 NOTE — HPI
57 year old man with HTN, HLD, CAD s/p MI, HFpEF(most recent echo 11/08/22), COPD, smoking, obesity who initially presented to [facility] 11/8 with TIA (RSW and numbness, aphasia). CTA showed no acute abnormality with 80% stenosis of R carotid bifurcation. MRI with no acute process. Carotid US showed >70% stenosis of L ICA and 50-69% stenosis of R ICA. Transferred to [facility] for Vascular Surgery consult. He presents today for L CEA. He does not have residual right sided deficits from his TIA.

## 2022-11-18 NOTE — NURSING
Pt arrived to unit, AAOX4 skin warm and dry, resp even and unlabored. Wife at bedside , bed alarm activated , dsg to left neck with old drainage noted and swelling assessed, pt passed bedside swallow test diet ordered will cont to observe

## 2022-11-19 NOTE — OP NOTE
Operative Report    Date of operation: 11/17/22    Pre-operative Diagnosis: symptomatic left carotid stenosis    Post-operative Diagnosis: same    Attending Surgeon: ELIDA Guadalupe MD    Resident/Fellow: Tone Cornejo MD, PGY6; Sharee Hernández MD PGY1    Operation performed: left carotid endarterectomy with bovine patch angioplasty    Indications:   56yo M with left     Procedure in Detail:  After informed consent was obtained, the patient was taken to the operating room in supine position. Appropriate hemodynamic monitors were put in place by the anesthesia team. General anesthesia was administered by the anesthesia team. The patient's left neck was prepped and draped in normal sterile fashion. A longitudinal incision was made along the anterior border of the sternocleidomastoid. The incision was deepened with a combination of electrocautery and blunt and sharp dissection. The jugular vein was identified and its medial border exposed until the facial vein was identified. The facial vein was ligated with 2-0 silk ties and divided. Next we turned our attention to the inferior aspect of the incision and dissected down along the medial border of the jugular vein to identify the common carotid artery. At this point we administered 5000 units of intravenous heparin for systemic anticoagulation. We then identified the vagus nerve in its normal anatomic position posterior/lateral to the carotid artery. We dissected the proximal common carotid out circumferentially and encircled it with a large vessel loop. We continued dissecting superiorly to the carotid bifurcation. We ligated the the superior thyroid artery with hemoclips and obtained circumferential control of the external carotid artery distal to that with a small vessel loop. We then turned our attention to the internal carotid artery. The internal carotid coursed deep in the neck towards the retropharyngeal space. We identified the hypoglossal nerve crossing  from posterior to anterior over the internal carotid artery.  We took care not to crush the nerve and carefully mobilized it superior/medially. We had to ligate the occipital branch of the external carotid to adequately mobilize the internal carotid. We dissected along the lateral internal carotid until we reached a soft segment that appeared absent of disease and took care not to injure the vagus nerve coursing posteriorly.  We dissected the distal internal carotid circumferentially and placed a large vessel loop around the artery.  Additional heparin was administered at this time to maintain ACT greater than 250 throughout the rest of the case.  Systolic blood pressure was then pushed up to above 160mmHg in anticipation of clamp time prior to shunting. We then sequentially applied a tension to the vessel loops on the internal, common, and external carotid arteries in that order. An arteriotomy was then made with an 11 blade on the anterior/lateral aspect of the common carotid. This was extended superiorly through the bifurcation onto the internal carotid until healthy internal carotid artery was reached. We then brought the hermosillo shunt to the field. The distal end was placed in the internal carotid as the clamp was removed. The balloon was advanced beyond the vessel loop and was inflated until back-bleeding was no longer visualized. The vessel loop around the ICA was tightened to secure the shunt in place. Back bleeding was checked through the side port of the shunt and was brisk and a hemostat was placed on the distal end. Next the proximal end was advanced into the CCA as the vessel loop was loosened to allow the shunt to pass. The balloon was inflated and the flow checked through the side port. All air was expelled from the shunt system and then the distal hemostat was removed to allow continued antegrade flow. With the shunt now in place we the SBP was lowered to less than 140mmHg and we proceeded with our  endarterectomy. Using a penfield elevator, we endarterectomized the common and internal carotid arteries in the deep medial plane. The plaque feathered nicely at the distal end. We performed eversion endarterectomy of the residual plaque in the external carotid artery. After endarterectomy was completed we thoroughly irrigated the lumen with heparinized saline to remove any remaining loose fronds from the internal and common carotid arteries. The ostium of the external carotid artery was free of disease. No distal tacking sutures were placed. We then brought a 0.8x8cm bovine pericardial patch to the field. We used the bovine pericardial patch to perform patch angioplasty of our arteriotomy with two running 6-0 prolene sutures. Prior to completion of the last few sutures, the shunt was clamped removed; first the distal end and next the proximal end. Systolic blood pressure was elevated to >160mmHg while the shunt was removed. The internal, external, and common carotid arteries were all back bleed individually and no thrombus was visualized. The lumen was thoroughly irrigated with heparinized saline and the final sutures were completed for patch closure. Tension was released on the vessel loop on the internal carotid artery to back bleed and check hemostasis of the patch. Satisfied with the degree of hemostasis we then applied debakey forceps to occlude the internal carotid at its origin. Next the vessel loops were released on the external and common carotid arteries to allow any residual debris to flush out the external carotid. After 5-6 cardiac cycles, the debakeys were removed from the internal carotid to allow antegrade cerebral flow. The systolic blood pressure was then lowered to less than 140mmHg for the remainder of the case. No repair suture were placed on the patch anastomosis. The internal carotid was evaluated with a sterile doppler and had a biphasic signal with continuous diastolic flow. 50mg of  protamine was administered for heparin reversal. The wound was thoroughly irrigated with saline and thrombin gelfoam was added to the wound to assist with hemostasis. The wound was again thoroughly irrigated with saline. Satisfied with our hemostasis we then proceeded to close the incision with a layer of 3-0 vicryl in the platysma and 4-0 Monocryl in the subcuticular layer.     All sponge, needle, and instrument counts were correct at completion of the case.    The patient tolerated the procedure well and was able to move his bilateral feet and hands at completion.  He was transported to the PACU for recovery post-op.      Estimated Blood loss: 150ml    Complications: none    ELIDA Guadalupe II, MD, [facility]  Vascular Surgeon  [facility]

## 2022-11-21 LAB
GLUCOSE SERPL-MCNC: 128 MG/DL (ref 70–110)
HCO3 UR-SCNC: 23.3 MMOL/L (ref 24–28)
HCT VFR BLD CALC: 46 %PCV (ref 36–54)
PCO2 BLDA: 39 MMHG (ref 35–45)
PH SMN: 7.38 [PH] (ref 7.35–7.45)
PO2 BLDA: 135 MMHG (ref 80–100)
POC ACTIVATED CLOTTING TIME K: 121 SEC (ref 74–137)
POC ACTIVATED CLOTTING TIME K: 202 SEC (ref 74–137)
POC ACTIVATED CLOTTING TIME K: 207 SEC (ref 74–137)
POC ACTIVATED CLOTTING TIME K: 214 SEC (ref 74–137)
POC ACTIVATED CLOTTING TIME K: 219 SEC (ref 74–137)
POC ACTIVATED CLOTTING TIME K: 787 SEC (ref 74–137)
POC BE: -2 MMOL/L
POC IONIZED CALCIUM: 1.07 MMOL/L (ref 1.06–1.42)
POC SATURATED O2: 99 % (ref 95–100)
POC TCO2: 24 MMOL/L (ref 23–27)
POTASSIUM BLD-SCNC: 4.5 MMOL/L (ref 3.5–5.1)
SAMPLE: ABNORMAL
SAMPLE: NORMAL
SODIUM BLD-SCNC: 137 MMOL/L (ref 136–145)

## 2022-11-22 ENCOUNTER — PATIENT OUTREACH (OUTPATIENT)
Dept: ADMINISTRATIVE | Facility: CLINIC | Age: 57
End: 2022-11-22
Payer: MEDICAID

## 2022-11-22 DIAGNOSIS — Z98.890 S/P CAROTID ENDARTERECTOMY: Primary | ICD-10-CM

## 2022-11-22 DIAGNOSIS — I65.22 SYMPTOMATIC CAROTID ARTERY STENOSIS, LEFT: Primary | ICD-10-CM

## 2022-11-28 ENCOUNTER — PES CALL (OUTPATIENT)
Dept: ADMINISTRATIVE | Facility: CLINIC | Age: 57
End: 2022-11-28
Payer: MEDICAID

## 2022-11-28 NOTE — H&P
I have seen and examined the patient.  He denies any significant changes in his symptoms since he was discharged from the hospital last week.  Denies any new TIA or stroke symptoms.  He has been taking his dual antiplatelet therapy and statin as instructed.  We will plan for left carotid endarterectomy today to reduce his future risk of stroke.  Please see previous inpatient consult note below for H&P.    The patient has been examined and the H&P has been reviewed:     I concur with the findings and no changes have occurred since H&P was written.     Surgery risks, benefits and alternative options discussed and understood by patient/family.          [facility] - Neurosurgery ([facility])  Vascular Surgery  Consult Note     Inpatient consult to Vascular Surgery  Consult performed by: Josette Shaw MD  Consult ordered by: Joy Khalil MD        Subjective:      Chief Complaint/Reason for Admission: TIA     History of Present Illness: 57 year old man with HTN, HLD, CAD s/p MI, HFpEF(most recent echo 11/08/22), COPD, smoking, obesity who initially presented to [facility] 11/8 with TIA (RSW and numbness, aphasia). CTA showed no acute abnormality with 80% stenosis of R carotid bifurcation. MRI with no acute process. Carotid US showed >70% stenosis of L ICA and 50-69% stenosis of R ICA. [place] [facility] [place].                      Medications Prior to Admission   Medication Sig Dispense Refill Last Dose    ALBUTEROL INHL Inhale into the lungs.          buPROPion (WELLBUTRIN XL) 150 MG TB24 tablet Take 150 mg by mouth once daily.          gabapentin (NEURONTIN) 300 MG capsule Take 300 mg by mouth 2 (two) times daily.          montelukast (SINGULAIR) 10 mg tablet Take 10 mg by mouth every evening.          pantoprazole (PROTONIX) 40 MG tablet Take 40 mg by mouth once daily.          [DISCONTINUED] lisinopriL-hydrochlorothiazide (PRINZIDE,ZESTORETIC) 20-25 mg Tab Take 1 tablet by mouth once daily.           [DISCONTINUED] sertraline (ZOLOFT) 50 MG tablet Take 50 mg by mouth every evening.          [DISCONTINUED] simvastatin (ZOCOR) 20 MG tablet Take 1 tablet by mouth once daily.                         Review of patient's allergies indicates:   Allergen Reactions    Maple flavor Other (See Comments)       Throat swelling                    Past Medical History:   Diagnosis Date    CHF (congestive heart failure)      COPD (chronic obstructive pulmonary disease)      HLD (hyperlipidemia)      Hypertension      MI, old                  Past Surgical History:   Procedure Laterality Date    LAPAROSCOPIC CHOLECYSTECTOMY N/A 4/11/2022     Procedure: CHOLECYSTECTOMY, LAPAROSCOPIC;  Surgeon: Neville Hutchinson MD;  [facility];  Service: General;  Laterality: N/A;    TONSILLECTOMY          Family History         Problem Relation (Age of Onset)     Hypertension Father                       Tobacco Use    Smoking status: Every Day       Packs/day: 1.00       Years: 30.00       Pack years: 30.00       Types: Cigarettes    Smokeless tobacco: Not on file   Substance and Sexual Activity    Alcohol use: Yes       Comment: a little    Drug use: Yes       Types: Marijuana       Comment: 3 to 4 times a week    Sexual activity: Yes       Partners: Female      Review of Systems   Constitutional:  Negative for activity change.   Neurological: Negative.  Negative for seizures, syncope, speech difficulty and weakness.        Asymptomatic at time of encounter   All other systems reviewed and are negative.  Objective:      Vital Signs (Most Recent):  Temp: 98.3 °F (36.8 °C) (11/11/22 1214)  Pulse: 71 (11/11/22 1214)  Resp: 19 (11/11/22 1214)  BP: (!) 168/87 (11/11/22 1214)  SpO2: (!) 93 % (11/11/22 1214)    Vital Signs (24h Range):  Temp:  [96.6 °F (35.9 °C)-98.7 °F (37.1 °C)] 98.3 °F (36.8 °C)  Pulse:  [61-97] 71  Resp:  [16-20] 19  SpO2:  [93 %-97 %] 93 %  BP: (120-188)/(60-90) 168/87      Weight: (!) 140.2 kg (309 lb)  Body mass  index is 44.98 kg/m².     Physical Exam  Vitals and nursing note reviewed.   Constitutional:       Appearance: Normal appearance.   HENT:      Head: Normocephalic and atraumatic.   Cardiovascular:      Rate and Rhythm: Normal rate and regular rhythm.   Pulmonary:      Effort: Pulmonary effort is normal. No respiratory distress.   Abdominal:      General: Abdomen is flat. There is no distension.      Palpations: Abdomen is soft. There is no mass.      Tenderness: There is no abdominal tenderness.      Hernia: No hernia is present.   Musculoskeletal:      Right lower leg: No edema.      Left lower leg: No edema.   Skin:     General: Skin is warm and dry.   Neurological:      General: No focal deficit present.      Mental Status: He is alert and oriented to person, place, and time.      Cranial Nerves: No cranial nerve deficit.      Sensory: No sensory deficit.      Motor: No weakness.      Coordination: Coordination normal.   Psychiatric:         Mood and Affect: Mood normal.         Behavior: Behavior normal.         Significant Labs:  CBC:         Recent Labs   Lab 11/11/22 0453   WBC 8.32   RBC 4.87   HGB 15.7   HCT 48.0      MCV 99*   MCH 32.2*   MCHC 32.7      CMP:         Recent Labs   Lab 11/11/22 0453   GLU 87   CALCIUM 9.3   ALBUMIN 3.8   PROT 7.1      K 4.5   CO2 29   CL 99   BUN 25*   CREATININE 0.8   ALKPHOS 79   ALT 17   AST 17   BILITOT 1.5*         Significant Diagnostics:  I have reviewed all pertinent imaging results/findings within the past 24 hours.     Assessment/Plan:      * Transient ischemic attack  56 yo M with BCAS who presented after TIA with right sided symptoms, L ICA stenosis > 70%     - Plan for L CEA 11/15  - Consent obtained  - Okay to DC and return as outpatient  - Return sooner if symptoms return            Thank you for your consult. I will follow-up with patient. Please contact us if you have any additional questions.     Josette Shaw MD  Vascular Surgery  [facility]  - Neurosurgery ([facility])

## 2022-11-30 ENCOUNTER — OFFICE VISIT (OUTPATIENT)
Dept: HOME HEALTH SERVICES | Facility: CLINIC | Age: 57
End: 2022-11-30
Payer: MEDICAID

## 2022-11-30 VITALS
WEIGHT: 313 LBS | RESPIRATION RATE: 20 BRPM | BODY MASS INDEX: 46.36 KG/M2 | DIASTOLIC BLOOD PRESSURE: 82 MMHG | OXYGEN SATURATION: 92 % | HEIGHT: 69 IN | TEMPERATURE: 99 F | HEART RATE: 92 BPM | SYSTOLIC BLOOD PRESSURE: 152 MMHG

## 2022-11-30 DIAGNOSIS — J43.9 PULMONARY EMPHYSEMA, UNSPECIFIED EMPHYSEMA TYPE: Chronic | ICD-10-CM

## 2022-11-30 DIAGNOSIS — F17.210 TOBACCO DEPENDENCE DUE TO CIGARETTES: Primary | ICD-10-CM

## 2022-11-30 DIAGNOSIS — J96.11 CHRONIC RESPIRATORY FAILURE WITH HYPOXIA: Chronic | ICD-10-CM

## 2022-11-30 DIAGNOSIS — E66.01 SEVERE OBESITY (BMI >= 40): Chronic | ICD-10-CM

## 2022-11-30 DIAGNOSIS — Z98.890 S/P CAROTID ENDARTERECTOMY: ICD-10-CM

## 2022-11-30 DIAGNOSIS — I65.22 SYMPTOMATIC CAROTID ARTERY STENOSIS, LEFT: ICD-10-CM

## 2022-11-30 PROCEDURE — 3008F PR BODY MASS INDEX (BMI) DOCUMENTED: ICD-10-PCS | Mod: CPTII,S$GLB,, | Performed by: NURSE PRACTITIONER

## 2022-11-30 PROCEDURE — 99406 PR TOBACCO USE CESSATION INTERMEDIATE 3-10 MINUTES: ICD-10-PCS | Mod: S$GLB,,, | Performed by: NURSE PRACTITIONER

## 2022-11-30 PROCEDURE — 3079F DIAST BP 80-89 MM HG: CPT | Mod: CPTII,S$GLB,, | Performed by: NURSE PRACTITIONER

## 2022-11-30 PROCEDURE — 99406 BEHAV CHNG SMOKING 3-10 MIN: CPT | Mod: S$GLB,,, | Performed by: NURSE PRACTITIONER

## 2022-11-30 PROCEDURE — 99495 TRANSJ CARE MGMT MOD F2F 14D: CPT | Mod: 25,S$GLB,, | Performed by: NURSE PRACTITIONER

## 2022-11-30 PROCEDURE — 4010F PR ACE/ARB THEARPY RXD/TAKEN: ICD-10-PCS | Mod: CPTII,S$GLB,, | Performed by: NURSE PRACTITIONER

## 2022-11-30 PROCEDURE — 3008F BODY MASS INDEX DOCD: CPT | Mod: CPTII,S$GLB,, | Performed by: NURSE PRACTITIONER

## 2022-11-30 PROCEDURE — 4010F ACE/ARB THERAPY RXD/TAKEN: CPT | Mod: CPTII,S$GLB,, | Performed by: NURSE PRACTITIONER

## 2022-11-30 PROCEDURE — 3079F PR MOST RECENT DIASTOLIC BLOOD PRESSURE 80-89 MM HG: ICD-10-PCS | Mod: CPTII,S$GLB,, | Performed by: NURSE PRACTITIONER

## 2022-11-30 PROCEDURE — 3044F PR MOST RECENT HEMOGLOBIN A1C LEVEL <7.0%: ICD-10-PCS | Mod: CPTII,S$GLB,, | Performed by: NURSE PRACTITIONER

## 2022-11-30 PROCEDURE — 3044F HG A1C LEVEL LT 7.0%: CPT | Mod: CPTII,S$GLB,, | Performed by: NURSE PRACTITIONER

## 2022-11-30 PROCEDURE — 3077F PR MOST RECENT SYSTOLIC BLOOD PRESSURE >= 140 MM HG: ICD-10-PCS | Mod: CPTII,S$GLB,, | Performed by: NURSE PRACTITIONER

## 2022-11-30 PROCEDURE — 99495 TCM SERVICES (MODERATE COMPLEXITY): ICD-10-PCS | Mod: 25,S$GLB,, | Performed by: NURSE PRACTITIONER

## 2022-11-30 PROCEDURE — 3077F SYST BP >= 140 MM HG: CPT | Mod: CPTII,S$GLB,, | Performed by: NURSE PRACTITIONER

## 2022-11-30 NOTE — PROGRESS NOTES
[facility]  Transition of Care Home Visit    Visit Date: 11/30/2022  Encounter Provider: Cris Wright   PCP:  Juancho Ortiz MD    PRESENTING HISTORY      Patient ID: Renard Jimenez is a 57 y.o. male.    Consult Requested By:  Cris Wright  Reason for Consult:  Hospital Follow Up.    Renard is being seen at home due to being seen at home due to physical debility that presents a taxing effort to leave the home, to mitigate high risk of hospital readmission and/or due to the limited availability of reliable or safe options for transportation to the point of access to the provider. Prior to treatment on this visit the chart was reviewed and patient verbal consent was obtained.      Chief Complaint: Transitional Care        History of Present Illness: Mr. Renard Jimenez is a 57 y.o. male who was recently admitted to the hospital.    Admit: 11/17/22    Discharge: 11/18/22   57 year old man with HTN, HLD, CAD s/p MI, HFpEF(most recent echo 11/08/22), COPD, smoking, obesity who initially presented to [facility] 11/8 with TIA (RSW and numbness, aphasia). CTA showed no acute abnormality with 80% stenosis of R carotid bifurcation. MRI with no acute process. Carotid US showed >70% stenosis of L ICA and 50-69% stenosis of R ICA. Transferred to [facility] for Vascular Surgery consult. He presents today for L CEA. He does not have residual right sided deficits from his TIA.        Procedure(s) (LRB):  ENDARTERECTOMY-CAROTID (Left)      Hospital Course: Renard Jimenez presented to [facility] on the morning of 11/17/2022 for Left CEA. The procedure was performed without complication and he was transferred to the floor for further post op care and monitoring. Their postoperative course was uneventful and progressed according to plan.  His pain was controlled with a combination of IV and PO narcotic pain medications. His diet has been advanced throughout the hospital stay. He is now ambulating without assistance,  "tolerating a regular diet, pain is well controlled with PO pain medication, and is voiding appropriately. Patient now meets all criteria for discharge.           Goals of Care Treatment Preferences:  Code Status: Full Code  ___________________________________________________________________    Today:    HPI:    Renard is being seen and examined at home today for transitional care visit in the home environment post-discharge from inpatient hospitalization encounter described above. Patient presents at baseline state of health as reported by patient and caregiver.     Renard is found ambulating around his home without wearing oxygen upon arrival. He is dyspneic until he sits and rests and applies his oxygen at 1.5 LPM NC. He states he is doing "good" and has no complaints. Denies incisional pain and reports he is keeping it dry. To see surgeon 12/16/22. Reports he is smoking less cigarettes since his recent surgery and hopes to quit smoking completely. Does not desire nicoderm patches at this time however.     VSS. Denies fever, chest pain, shortness of breath, nausea, vomiting, diarrhea. Denies any acute issues, concerns or complaints to address on today's visit. Reports taking all medications as prescribed. No other needs identified at this time           Assessments:  Environmental: lives with spouse in single story home that is cluttered, unkempt  Functional Status: independent  Safety: no issues identified  Nutritional: has adequate access, reports "good" appetite  Home Health/DME/Supplies: No HH. DME: oxygen, walker    PAST HISTORY:     Past Medical History:   Diagnosis Date    CHF (congestive heart failure)     COPD (chronic obstructive pulmonary disease)     HLD (hyperlipidemia)     Hypertension     MI, old        Past Surgical History:   Procedure Laterality Date    CAROTID ENDARTERECTOMY Left 11/17/2022    Procedure: ENDARTERECTOMY-CAROTID;  Surgeon: ELIDA Guadalupe II, MD;  Location: [facility] OR [address] [place] [address];  " Service: Vascular;  Laterality: Left;    LAPAROSCOPIC CHOLECYSTECTOMY N/A 4/11/2022    Procedure: CHOLECYSTECTOMY, LAPAROSCOPIC;  Surgeon: Neville Hutchinson MD;  [facility];  Service: General;  Laterality: N/A;    TONSILLECTOMY         Family History   Problem Relation Age of Onset    Hypertension Father        Social History     Socioeconomic History    Marital status:    Tobacco Use    Smoking status: Every Day     Packs/day: 1.00     Years: 30.00     Pack years: 30.00     Types: Cigarettes   Substance and Sexual Activity    Alcohol use: Not Currently    Drug use: Yes     Types: Marijuana     Comment: 3 to 4 times a week    Sexual activity: Yes     Partners: Female     Social Determinants of Health     Financial Resource Strain: Unknown    Difficulty of Paying Living Expenses: Patient refused   Food Insecurity: Unknown    Worried About Running Out of Food in the Last Year: Patient refused    Ran Out of Food in the Last Year: Patient refused   Transportation Needs: Unknown    Lack of Transportation (Medical): Patient refused    Lack of Transportation (Non-Medical): Patient refused   Physical Activity: Unknown    Days of Exercise per Week: Patient refused    Minutes of Exercise per Session: Patient refused   Stress: Unknown    Feeling of Stress : Patient refused   Social Connections: Unknown    Frequency of Communication with Friends and Family: Patient refused    Frequency of Social Gatherings with Friends and Family: Patient refused    Attends Evangelical Services: Patient refused    Active Member of Clubs or Organizations: Patient refused    Marital Status:    Housing Stability: Unknown    Unable to Pay for Housing in the Last Year: Patient refused    Unstable Housing in the Last Year: Patient refused       MEDICATIONS & ALLERGIES:     Current Outpatient Medications on File Prior to Visit   Medication Sig Dispense Refill    albuterol (PROVENTIL) 2.5 mg /3 mL (0.083 %) nebulizer solution Take 2.5  mg by nebulization every 4 (four) hours as needed.      albuterol (PROVENTIL/VENTOLIN HFA) 90 mcg/actuation inhaler Inhale 2 puffs into the lungs every 6 (six) hours as needed.      aspirin 81 MG Chew Take 1 tablet (81 mg total) by mouth once daily. 30 tablet 3    atorvastatin (LIPITOR) 80 MG tablet Take 1 tablet (80 mg total) by mouth once daily. 30 tablet 3    clopidogreL (PLAVIX) 75 mg tablet Take 1 tablet (75 mg total) by mouth once daily. (Patient taking differently: Take 75 mg by mouth every evening.) 30 tablet 0    diclofenac (VOLTAREN) 75 MG EC tablet 75 mg.      fluticasone furoate-vilanteroL (BREO) 200-25 mcg/dose DsDv diskus inhaler Breo Ellipta 200 mcg-25 mcg/dose powder for inhalation   Inhale by inhalation route for 30 days.      gabapentin (NEURONTIN) 300 MG capsule Take 300 mg by mouth 2 (two) times daily.      lisinopriL (PRINIVIL,ZESTRIL) 5 MG tablet Take 1 tablet (5 mg total) by mouth once daily. Take 5mg daily until after your carotid artery procedure. The dose may increase back to your previous dose afterwards, but check with your healthcare provider. 30 tablet 0    montelukast (SINGULAIR) 10 mg tablet Take 10 mg by mouth every evening.      buPROPion (WELLBUTRIN XL) 150 MG TB24 tablet Take 150 mg by mouth once daily.      lisinopriL-hydrochlorothiazide (PRINZIDE,ZESTORETIC) 20-25 mg Tab lisinopril 20 mg-hydrochlorothiazide 25 mg tablet   TAKE ONE TABLET BY MOUTH EVERY DAY      nicotine (NICODERM CQ) 21 mg/24 hr Place 1 patch onto the skin once daily. (Patient not taking: Reported on 11/30/2022) 28 patch 2    pantoprazole (PROTONIX) 40 MG tablet Take 40 mg by mouth once daily.       Current Facility-Administered Medications on File Prior to Visit   Medication Dose Route Frequency Provider Last Rate Last Admin    diphenhydrAMINE injection 25 mg  25 mg Intravenous Q6H PRN Enoc Rosales MD        HYDROmorphone injection 0.2 mg  0.2 mg Intravenous Q5 Min PRN Enoc Rosales MD   0.2 mg at  11/17/22 2245    prochlorperazine injection Soln 5 mg  5 mg Intravenous Q30 Min PRN Enoc Rosales MD        sodium chloride 0.9% flush 10 mL  10 mL Intravenous PRN Enoc Rosales MD            Review of patient's allergies indicates:   Allergen Reactions    Maple flavor Other (See Comments)     Throat swelling         OBJECTIVE:     Vital Signs:  Vitals:    11/30/22 1330   BP: (!) 152/82   Pulse: 92   Resp: 20   Temp: 98.7 °F (37.1 °C)     Body mass index is 46.22 kg/m².     Physical Exam:  Constitutional:       Appearance: Obese, male, ambulating around his home, +PEDERSEN, oxygen dependent  HENT:      Head: Normocephalic and atraumatic. Distorted, extremely raspy voice.   Cardiovascular:      Rate and Rhythm: Normal rate and regular rhythm.   Pulmonary:      Effort: Pulmonary effort is normal at rest, +PEDERSEN, scattered exp wheezing.   Abdominal:      General: Abdomen is obese. There is no distension.      Palpations: Abdomen is soft. There is no mass.      Tenderness: There is no abdominal tenderness.      Hernia: No hernia is present.   Musculoskeletal:      Right lower leg: No edema.      Left lower leg: No edema.   Skin:     General: Skin is warm and dry.   Neurological:      General: No focal deficit present.      Mental Status: He is alert and oriented to person, place, and time.      Cranial Nerves: No cranial nerve deficit.      Sensory: No sensory deficit.      Motor: No weakness.      Coordination: Coordination normal.   Psychiatric:         Mood and Affect: Mood normal.         Behavior: Behavior normal.      Left CEA           Laboratory  Lab Results   Component Value Date    WBC 15.18 (H) 11/18/2022    HGB 14.8 11/18/2022    HCT 47.4 11/18/2022     (H) 11/18/2022     11/18/2022     Lab Results   Component Value Date    INR 1.0 11/11/2022    INR 1.0 11/09/2022    INR 1.2 11/08/2022     Lab Results   Component Value Date    HGBA1C 5.5 11/08/2022     No results for input(s): POCTGLUCOSE in the  last 72 hours.    Diagnostic Results:  reviewed    TRANSITION OF CARE:     [facility] Contact Note: 11/22/22    Family and/or Caretaker present at visit?  No.  Diagnostic tests reviewed/disposition: No diagnosic tests pending after this hospitalization.  Disease/illness education: Carotid stenosis, Left CEA, tobacco dependence  Home health/community services discussion/referrals: Patient does not have home health established from hospital visit.  They do not need home health.  If needed, we will set up home health for the patient.   Establishment or re-establishment of referral orders for community resources: No other necessary community resources.   Discussion with other health care providers: No discussion with other health care providers necessary.     Transition of Care Visit:  I have reviewed and updated the history and problem list.  I have reconciled the medication list.  I have discussed the hospitalization and current medical issues, prognosis and plans with the patient/family.  I  spent more than 50% of time discussing the care with the patient/family.  Total Face-to-Face Encounter: 60 minutes.    Dangers of cigarette smoking were reviewed with patient in detail for 10 minutes and patient was encouraged to quit. Nicotine replacement options were discussed.    Medications Reconciliation:   I have reconciled the patient's home medications and discharge medications with the patient/family. I have updated all changes.  Refer to After-Visit Medication List.    ASSESSMENT & PLAN:       1. Tobacco dependence due to cigarettes [F17.210 (ICD-10-CM)]  Assessment & Plan:  Reports smoking less since surgery with desire to quit but refuses nicoderm patches or other interventions.  Negative effects of tobaco on body systems discussed today. Encouraged to quit. Nicotene replacement options were discussed but declined by patient.         2. S/P carotid endarterectomy  Overview:  S/p Left CEA 11/17/22.    Assessment &  Plan:  Stable.  Followed by Cardiology.   Incisions with some periwound erythema. Stables dry and intact.  Educated patient to report to ER if incision changes-pain, swelling, bleeding.    Orders:  -     Ambulatory referral/consult to [facility]    3. Chronic respiratory failure with hypoxia  Assessment & Plan:  Chronic and related to pulmonary emphysema.  Oxygen dependent.        4. Severe obesity (BMI >= 40)  Assessment & Plan:  BMI 42.38.  Morbid obesity complicates all aspects of disease management from diagnostic modalities to treatment. Weight loss encouraged and health benefits explained to patient.      5. Pulmonary emphysema, unspecified emphysema type  Assessment & Plan:  Chronic.  Current smoker.  Oxygen dependent.  +PEDERSEN.        6. Symptomatic carotid artery stenosis, left  Overview:  S/p left CEA 11/17/22.    Assessment & Plan:  Stable.  Followed by Cardiology.            Were controlled substances prescribed?  No        Scheduled Follow-up :  Future Appointments   Date Time Provider Department Center   12/16/2022  1:00 PM VASCULAR, LAB [facility] UZAIR Scott   12/16/2022  1:30 PM [facility]       After Visit Medication List :     Medication List            Accurate as of November 30, 2022 11:59 PM. If you have any questions, ask your nurse or doctor.                CHANGE how you take these medications      clopidogreL 75 mg tablet  Commonly known as: PLAVIX  Take 1 tablet (75 mg total) by mouth once daily.  What changed: when to take this            CONTINUE taking these medications      * albuterol 90 mcg/actuation inhaler  Commonly known as: PROVENTIL/VENTOLIN HFA     * albuterol 2.5 mg /3 mL (0.083 %) nebulizer solution  Commonly known as: PROVENTIL     aspirin 81 MG Chew  Take 1 tablet (81 mg total) by mouth once daily.     atorvastatin 80 MG tablet  Commonly known as: LIPITOR  Take 1 tablet (80 mg total) by mouth once daily.     buPROPion 150 MG  TB24 tablet  Commonly known as: WELLBUTRIN XL     diclofenac 75 MG EC tablet  Commonly known as: VOLTAREN     fluticasone furoate-vilanteroL 200-25 mcg/dose Dsdv diskus inhaler  Commonly known as: BREO     gabapentin 300 MG capsule  Commonly known as: NEURONTIN     lisinopriL 5 MG tablet  Commonly known as: PRINIVIL,ZESTRIL  Take 1 tablet (5 mg total) by mouth once daily. Take 5mg daily until after your carotid artery procedure. The dose may increase back to your previous dose afterwards, but check with your healthcare provider.     lisinopriL-hydrochlorothiazide 20-25 mg Tab  Commonly known as: PRINZIDE,ZESTORETIC     montelukast 10 mg tablet  Commonly known as: SINGULAIR     nicotine 21 mg/24 hr  Commonly known as: NICODERM CQ  Place 1 patch onto the skin once daily.     pantoprazole 40 MG tablet  Commonly known as: PROTONIX           * This list has 2 medication(s) that are the same as other medications prescribed for you. Read the directions carefully, and ask your doctor or other care provider to review them with you.                  Signature:  Cris Wright NP

## 2022-12-04 ENCOUNTER — HOSPITAL ENCOUNTER (EMERGENCY)
Facility: HOSPITAL | Age: 57
Discharge: HOME | End: 2022-12-04
Attending: EMERGENCY MEDICINE
Payer: MEDICAID

## 2022-12-04 VITALS
OXYGEN SATURATION: 97 % | TEMPERATURE: 98 F | HEART RATE: 82 BPM | DIASTOLIC BLOOD PRESSURE: 69 MMHG | WEIGHT: 290 LBS | SYSTOLIC BLOOD PRESSURE: 134 MMHG | BODY MASS INDEX: 42.95 KG/M2 | HEIGHT: 69 IN | RESPIRATION RATE: 20 BRPM

## 2022-12-04 DIAGNOSIS — Z98.890 S/P CAROTID ENDARTERECTOMY: ICD-10-CM

## 2022-12-04 DIAGNOSIS — T81.31XA WOUND DEHISCENCE, SURGICAL, INITIAL ENCOUNTER: Primary | ICD-10-CM

## 2022-12-04 PROCEDURE — 99283 EMERGENCY DEPT VISIT LOW MDM: CPT

## 2022-12-04 PROCEDURE — 25000003 PHARM REV CODE 250: Performed by: PHYSICIAN ASSISTANT

## 2022-12-04 RX ORDER — MUPIROCIN 20 MG/G
1 OINTMENT TOPICAL
Status: COMPLETED | OUTPATIENT
Start: 2022-12-04 | End: 2022-12-04

## 2022-12-04 RX ADMIN — MUPIROCIN 22 G: 20 OINTMENT TOPICAL at 04:12

## 2022-12-04 NOTE — ED PROVIDER NOTES
Encounter Date: 12/4/2022    SCRIBE #1 NOTE: I, Katherine Saul, arjun scribing for, and in the presence of,  Katherine Drew PA-C.     History     Chief Complaint   Patient presents with    Wound Check     Pt had arterial surgery to neck on 11/17, staples have come loose.  No active bleeding.     Time seen by provider: 3:53 PM on 12/04/2022    Renard Jimenez is a 57 y.o. male who presents to the ED for a wound check s/p carotid endarterectomy on 11/18/2022 by Dr. Guadalupe. The patient notes one of the staples from the incision site came lose this morning. The patient's spouse notes next appointment with Dr. Guadalupe is on 12/16/2022. The patient denies current antibiotic use. Patient admits to daily cigarette smoking. The patient does not deny any Sx at this time. PMHx of HTN, COPD, and CHF. PSHx of left carotid endarterectomy (11/17/2022).       The history is provided by the patient.   Review of patient's allergies indicates:   Allergen Reactions    Maple flavor Other (See Comments)     Throat swelling       Past Medical History:   Diagnosis Date    CHF (congestive heart failure)     COPD (chronic obstructive pulmonary disease)     HLD (hyperlipidemia)     Hypertension     MI, old      Past Surgical History:   Procedure Laterality Date    CAROTID ENDARTERECTOMY Left 11/17/2022    Procedure: ENDARTERECTOMY-CAROTID;  Surgeon: ELIDA Guadalupe II, MD;  Location: [address];  Service: Vascular;  Laterality: Left;    LAPAROSCOPIC CHOLECYSTECTOMY N/A 4/11/2022    Procedure: CHOLECYSTECTOMY, LAPAROSCOPIC;  Surgeon: Neville Hutchinson MD;  Location: [facility];  Service: General;  Laterality: N/A;    TONSILLECTOMY       Family History   Problem Relation Age of Onset    Hypertension Father      Social History     Tobacco Use    Smoking status: Every Day     Packs/day: 1.00     Years: 30.00     Pack years: 30.00     Types: Cigarettes   Substance Use Topics    Alcohol use: Not Currently    Drug use: Yes     Types:  Marijuana     Comment: 3 to 4 times a week     Review of Systems   Constitutional:  Negative for chills and fever.   HENT:  Negative for trouble swallowing.    Respiratory:  Negative for cough, chest tightness, shortness of breath and wheezing.    Cardiovascular:  Negative for chest pain and palpitations.   Musculoskeletal:  Negative for arthralgias, back pain, joint swelling, myalgias, neck pain and neck stiffness.   Skin:  Positive for wound. Negative for color change, pallor and rash.   Neurological:  Negative for dizziness, syncope, weakness, light-headedness, numbness and headaches.   Hematological:  Does not bruise/bleed easily.   Psychiatric/Behavioral:  The patient is not nervous/anxious.      Physical Exam     Initial Vitals [12/04/22 1435]   BP Pulse Resp Temp SpO2   134/69 82 20 98.3 °F (36.8 °C) 97 %      MAP       --         Physical Exam    Nursing note and vitals reviewed.  Constitutional: He appears well-developed and well-nourished. He is not diaphoretic. No distress.   HENT:   Head: Normocephalic and atraumatic.   Right Ear: External ear normal.   Left Ear: External ear normal.   Nose: Nose normal.   Mouth/Throat: Uvula is midline and oropharynx is clear and moist.   Neck: Neck supple.   Small area (1-2 staples) of wound dehiscence noted to left sided neck. Wound appears superficial. Small amount of serosanguinous discharge, but no purulence or active bleeding noted.     Normal range of motion.  Cardiovascular:  Normal rate, regular rhythm, normal heart sounds and intact distal pulses.           Pulmonary/Chest: Breath sounds normal. No respiratory distress. He has no wheezes. He has no rhonchi. He has no rales.   Musculoskeletal:         General: No tenderness or edema. Normal range of motion.      Cervical back: Normal range of motion and neck supple.     Lymphadenopathy:     He has no cervical adenopathy.   Neurological: He is alert and oriented to person, place, and time. He has normal  strength. No sensory deficit.   Skin: Skin is warm and dry. No rash and no abscess noted. No erythema.   Psychiatric: He has a normal mood and affect.       ED Course   Procedures  Labs Reviewed - No data to display       Imaging Results    None          Medications   mupirocin 2 % ointment 22 g (22 g Topical (Top) Given 12/4/22 6355)     Medical Decision Making:   History:   Old Medical Records: I decided to obtain old medical records.  Old Records Summarized: records from clinic visits and records from previous admission(s).  Differential Diagnosis:   Wound dehiscence   Abscess  Cellulitis        APC / Resident Notes:   Small area of superficial wound dehiscence noted without surrounding evidence for cellulitis or abscess.  Wound is cleaned and will be left open to heal via secondary intention.  He will be discharged home to follow-up with his surgeon for re-evaluation as scheduled.  He voices understanding and is agreeable to the plan.  He is given specific return precautions.    He is encouraged to stop smoking.    Scribe Attestation:   Scribe #1: I performed the above scribed service and the documentation accurately describes the services I performed. I attest to the accuracy of the note.    Attending Attestation:     Physician Attestation Statement for NP/PA:   I discussed this assessment and plan of this patient with the NP/PA, but I did not personally examine the patient. The face to face encounter was performed by the NP/PA.    Other NP/PA Attestation Additions:    History of Present Illness: 57-year-old male presented for a wound check.    Medical Decision Making: I am in agreement with the physician assistant's  assessment, treatment, and plan of care.                      I, Katherine Drew PA-C, personally performed the services described in this documentation. All medical record entries made by the scribe were at my direction and in my presence.  I have reviewed the chart and agree that the record  reflects my personal performance and is accurate and complete. Katherine Drew PA-C.  7:57 PM 12/04/2022    Clinical Impression:   Final diagnoses:  [T81.31XA] Wound dehiscence, surgical, initial encounter (Primary)  [Z98.890] S/P carotid endarterectomy      ED Disposition Condition    Discharge Stable          ED Prescriptions    None       Follow-up Information       Follow up With Specialties Details Why Contact Info    [facility] Emergency Dept Emergency Medicine  As needed, If symptoms worsen [address] [place] [id number] [phone]    ELIDA Guadalupe II, MD Vascular Surgery  for re-evaluation next week [address] [facility] [address]  [phone]               Katherine Drew PA-C  12/04/22 1957       Emeterio Dos Santos MD  12/04/22 2124

## 2022-12-05 ENCOUNTER — DOCUMENTATION ONLY (OUTPATIENT)
Dept: REHABILITATION | Facility: HOSPITAL | Age: 57
End: 2022-12-05

## 2022-12-05 NOTE — PROGRESS NOTES
"Pt arrived to therapy for scheduled physical therapy eval. Pt and wife present. Pt reports he was discharged from PT in the hospital after his TIA and reports he is back to his baseline 100%. Pt reports he is not having any difficulty with any movements, balance, gait etc, nor any issues with fine motor skills ADLS, speech memory etc. " I don't know why I was sent here." Pt had staples in from recent endarctectomy in left front area of the neck. Pt reports he had his wound assessed yesterday and everything looks good with it. Educated pt about future signs and symptoms of stroke and what to do if that ever happened again. Educated to call and make a therapy appointment in the future once the staples are out and is healed if he is having any trouble orthopaedically with his neck. Patient does not require skilled PT at this time.     "

## 2022-12-10 PROBLEM — Z98.890 S/P CAROTID ENDARTERECTOMY: Status: ACTIVE | Noted: 2022-12-10

## 2022-12-10 PROBLEM — F17.210 TOBACCO DEPENDENCE DUE TO CIGARETTES: Status: ACTIVE | Noted: 2022-12-10

## 2022-12-10 NOTE — ASSESSMENT & PLAN NOTE
BMI 42.38.  Morbid obesity complicates all aspects of disease management from diagnostic modalities to treatment. Weight loss encouraged and health benefits explained to patient.

## 2022-12-10 NOTE — ASSESSMENT & PLAN NOTE
Stable.  Followed by Cardiology.   Incisions with some periwound erythema. Stables dry and intact.  Educated patient to report to ER if incision changes-pain, swelling, bleeding.

## 2022-12-10 NOTE — ASSESSMENT & PLAN NOTE
Reports smoking less since surgery with desire to quit but refuses nicoderm patches or other interventions.  Negative effects of tobaco on body systems discussed today. Encouraged to quit. Nicotene replacement options were discussed but declined by patient.

## 2022-12-16 ENCOUNTER — OFFICE VISIT (OUTPATIENT)
Dept: VASCULAR SURGERY | Facility: CLINIC | Age: 57
End: 2022-12-16
Attending: SURGERY
Payer: MEDICAID

## 2022-12-16 ENCOUNTER — HOSPITAL ENCOUNTER (OUTPATIENT)
Dept: VASCULAR SURGERY | Facility: CLINIC | Age: 57
Discharge: HOME | End: 2022-12-16
Attending: SURGERY
Payer: MEDICAID

## 2022-12-16 VITALS
TEMPERATURE: 98 F | DIASTOLIC BLOOD PRESSURE: 81 MMHG | HEART RATE: 48 BPM | BODY MASS INDEX: 45.71 KG/M2 | SYSTOLIC BLOOD PRESSURE: 172 MMHG | HEIGHT: 69 IN | WEIGHT: 308.63 LBS

## 2022-12-16 DIAGNOSIS — I65.22 SYMPTOMATIC CAROTID ARTERY STENOSIS, LEFT: ICD-10-CM

## 2022-12-16 DIAGNOSIS — I65.22 SYMPTOMATIC CAROTID ARTERY STENOSIS, LEFT: Primary | ICD-10-CM

## 2022-12-16 DIAGNOSIS — I65.23 BILATERAL CAROTID ARTERY STENOSIS: Primary | ICD-10-CM

## 2022-12-16 PROCEDURE — 99999 PR PBB SHADOW E&M-EST. PATIENT-LVL III: CPT | Mod: PBBFAC,,, | Performed by: SURGERY

## 2022-12-16 PROCEDURE — 93880 EXTRACRANIAL BILAT STUDY: CPT | Mod: PBBFAC | Performed by: SURGERY

## 2022-12-16 PROCEDURE — 3079F PR MOST RECENT DIASTOLIC BLOOD PRESSURE 80-89 MM HG: ICD-10-PCS | Mod: CPTII,,, | Performed by: SURGERY

## 2022-12-16 PROCEDURE — 4010F PR ACE/ARB THEARPY RXD/TAKEN: ICD-10-PCS | Mod: CPTII,,, | Performed by: SURGERY

## 2022-12-16 PROCEDURE — 99999 PR PBB SHADOW E&M-EST. PATIENT-LVL III: ICD-10-PCS | Mod: PBBFAC,,, | Performed by: SURGERY

## 2022-12-16 PROCEDURE — 1159F PR MEDICATION LIST DOCUMENTED IN MEDICAL RECORD: ICD-10-PCS | Mod: CPTII,,, | Performed by: SURGERY

## 2022-12-16 PROCEDURE — 3008F PR BODY MASS INDEX (BMI) DOCUMENTED: ICD-10-PCS | Mod: CPTII,,, | Performed by: SURGERY

## 2022-12-16 PROCEDURE — 3077F PR MOST RECENT SYSTOLIC BLOOD PRESSURE >= 140 MM HG: ICD-10-PCS | Mod: CPTII,,, | Performed by: SURGERY

## 2022-12-16 PROCEDURE — 99024 PR POST-OP FOLLOW-UP VISIT: ICD-10-PCS | Mod: ,,, | Performed by: SURGERY

## 2022-12-16 PROCEDURE — 3079F DIAST BP 80-89 MM HG: CPT | Mod: CPTII,,, | Performed by: SURGERY

## 2022-12-16 PROCEDURE — 3044F PR MOST RECENT HEMOGLOBIN A1C LEVEL <7.0%: ICD-10-PCS | Mod: CPTII,,, | Performed by: SURGERY

## 2022-12-16 PROCEDURE — 99024 POSTOP FOLLOW-UP VISIT: CPT | Mod: ,,, | Performed by: SURGERY

## 2022-12-16 PROCEDURE — 3008F BODY MASS INDEX DOCD: CPT | Mod: CPTII,,, | Performed by: SURGERY

## 2022-12-16 PROCEDURE — 93880 PR DUPLEX SCAN EXTRACRANIAL,BILAT: ICD-10-PCS | Mod: 26,S$PBB,, | Performed by: SURGERY

## 2022-12-16 PROCEDURE — 1159F MED LIST DOCD IN RCRD: CPT | Mod: CPTII,,, | Performed by: SURGERY

## 2022-12-16 PROCEDURE — 3044F HG A1C LEVEL LT 7.0%: CPT | Mod: CPTII,,, | Performed by: SURGERY

## 2022-12-16 PROCEDURE — 3077F SYST BP >= 140 MM HG: CPT | Mod: CPTII,,, | Performed by: SURGERY

## 2022-12-16 PROCEDURE — 99213 OFFICE O/P EST LOW 20 MIN: CPT | Mod: PBBFAC,25 | Performed by: SURGERY

## 2022-12-16 PROCEDURE — 93880 EXTRACRANIAL BILAT STUDY: CPT | Mod: 26,S$PBB,, | Performed by: SURGERY

## 2022-12-16 PROCEDURE — 4010F ACE/ARB THERAPY RXD/TAKEN: CPT | Mod: CPTII,,, | Performed by: SURGERY

## 2022-12-16 RX ORDER — DOXYCYCLINE HYCLATE 100 MG
100 TABLET ORAL 2 TIMES DAILY
Qty: 14 TABLET | Refills: 0 | Status: SHIPPED | OUTPATIENT
Start: 2022-12-16 | End: 2023-01-20

## 2022-12-16 NOTE — PROGRESS NOTES
Vascular surgery clinic note    HPI: s/p CEA 4 wks ago.    Physical:  Erythema around incision, staples in place  CN II-XII motor function intact  5/5 strength in bilateral upper/lower extremities, no focal deficits      A/P:  57-year-old male status post left carotid endarterectomy.  He is taken very poor care of his incision.  It is erythematous and he is still has staples in place despite being 4 weeks since his surgery.  I will start him on a short course of doxycycline to ensure that there was no infection around the incision.  He still has severe stenosis of the right carotid artery.  I have no need to treat this at this time as long as he continues best medical therapy.    -Doxy 100mg BID  -continue ASA 81mg daily  -Continue statin      G. Daquan Guadalupe II, MD, VI  Vascular Surgery  [facility]

## 2022-12-27 DIAGNOSIS — J44.9 CHRONIC OBSTRUCTIVE LUNG DISEASE: Primary | ICD-10-CM

## 2023-01-04 ENCOUNTER — HOSPITAL ENCOUNTER (OUTPATIENT)
Dept: RADIOLOGY | Facility: HOSPITAL | Age: 58
Discharge: HOME | End: 2023-01-04
Payer: MEDICAID

## 2023-01-04 DIAGNOSIS — J44.9 CHRONIC OBSTRUCTIVE LUNG DISEASE: ICD-10-CM

## 2023-01-04 PROCEDURE — 71046 X-RAY EXAM CHEST 2 VIEWS: CPT | Mod: TC

## 2023-02-05 ENCOUNTER — HOSPITAL ENCOUNTER (EMERGENCY)
Facility: HOSPITAL | Age: 58
Discharge: HOME | End: 2023-02-05
Attending: EMERGENCY MEDICINE
Payer: MEDICAID

## 2023-02-05 VITALS
DIASTOLIC BLOOD PRESSURE: 84 MMHG | HEIGHT: 69 IN | BODY MASS INDEX: 46.65 KG/M2 | OXYGEN SATURATION: 94 % | HEART RATE: 75 BPM | RESPIRATION RATE: 24 BRPM | WEIGHT: 315 LBS | SYSTOLIC BLOOD PRESSURE: 166 MMHG | TEMPERATURE: 98 F

## 2023-02-05 DIAGNOSIS — R09.02 HYPOXIA: ICD-10-CM

## 2023-02-05 DIAGNOSIS — L98.9 GROUPED SKIN LESIONS: Primary | ICD-10-CM

## 2023-02-05 LAB
ALLENS TEST: ABNORMAL
ANION GAP SERPL CALC-SCNC: 9 MMOL/L (ref 8–16)
BASOPHILS # BLD AUTO: 0.02 K/UL (ref 0–0.2)
BASOPHILS NFR BLD: 0.2 % (ref 0–1.9)
BNP SERPL-MCNC: 311 PG/ML (ref 0–99)
BUN SERPL-MCNC: 15 MG/DL (ref 6–20)
CALCIUM SERPL-MCNC: 9 MG/DL (ref 8.7–10.5)
CHLORIDE SERPL-SCNC: 101 MMOL/L (ref 95–110)
CO2 SERPL-SCNC: 30 MMOL/L (ref 23–29)
CREAT SERPL-MCNC: 1.1 MG/DL (ref 0.5–1.4)
DELSYS: ABNORMAL
DIFFERENTIAL METHOD: ABNORMAL
EOSINOPHIL # BLD AUTO: 0 K/UL (ref 0–0.5)
EOSINOPHIL NFR BLD: 0 % (ref 0–8)
ERYTHROCYTE [DISTWIDTH] IN BLOOD BY AUTOMATED COUNT: 14.2 % (ref 11.5–14.5)
EST. GFR  (NO RACE VARIABLE): >60 ML/MIN/1.73 M^2
FLOW: 6
GLUCOSE SERPL-MCNC: 101 MG/DL (ref 70–110)
HCO3 UR-SCNC: 34.2 MMOL/L (ref 24–28)
HCT VFR BLD AUTO: 51.3 % (ref 40–54)
HGB BLD-MCNC: 16 G/DL (ref 14–18)
IMM GRANULOCYTES # BLD AUTO: 0.01 K/UL (ref 0–0.04)
IMM GRANULOCYTES NFR BLD AUTO: 0.1 % (ref 0–0.5)
INFLUENZA A, MOLECULAR: NEGATIVE
INFLUENZA B, MOLECULAR: NEGATIVE
LYMPHOCYTES # BLD AUTO: 1.5 K/UL (ref 1–4.8)
LYMPHOCYTES NFR BLD: 17.3 % (ref 18–48)
MCH RBC QN AUTO: 31.4 PG (ref 27–31)
MCHC RBC AUTO-ENTMCNC: 31.2 G/DL (ref 32–36)
MCV RBC AUTO: 101 FL (ref 82–98)
MODE: ABNORMAL
MONOCYTES # BLD AUTO: 0.7 K/UL (ref 0.3–1)
MONOCYTES NFR BLD: 8.1 % (ref 4–15)
NEUTROPHILS # BLD AUTO: 6.4 K/UL (ref 1.8–7.7)
NEUTROPHILS NFR BLD: 74.3 % (ref 38–73)
NRBC BLD-RTO: 0 /100 WBC
PCO2 BLDA: 67.8 MMHG (ref 35–45)
PH SMN: 7.31 [PH] (ref 7.35–7.45)
PLATELET # BLD AUTO: 171 K/UL (ref 150–450)
PMV BLD AUTO: 10.7 FL (ref 9.2–12.9)
PO2 BLDA: 48 MMHG (ref 80–100)
POC BE: 8 MMOL/L
POC SATURATED O2: 77 % (ref 95–100)
POC TCO2: 36 MMOL/L (ref 23–27)
POTASSIUM SERPL-SCNC: 4.2 MMOL/L (ref 3.5–5.1)
RBC # BLD AUTO: 5.09 M/UL (ref 4.6–6.2)
SAMPLE: ABNORMAL
SARS-COV-2 RDRP RESP QL NAA+PROBE: NEGATIVE
SITE: ABNORMAL
SODIUM SERPL-SCNC: 140 MMOL/L (ref 136–145)
SP02: 86
SPECIMEN SOURCE: NORMAL
TROPONIN I SERPL DL<=0.01 NG/ML-MCNC: 0.09 NG/ML (ref 0–0.03)
WBC # BLD AUTO: 8.56 K/UL (ref 3.9–12.7)

## 2023-02-05 PROCEDURE — 85025 COMPLETE CBC W/AUTO DIFF WBC: CPT | Performed by: EMERGENCY MEDICINE

## 2023-02-05 PROCEDURE — 93010 EKG 12-LEAD: ICD-10-PCS | Mod: ,,, | Performed by: INTERNAL MEDICINE

## 2023-02-05 PROCEDURE — 83880 ASSAY OF NATRIURETIC PEPTIDE: CPT | Performed by: EMERGENCY MEDICINE

## 2023-02-05 PROCEDURE — 86592 SYPHILIS TEST NON-TREP QUAL: CPT | Performed by: EMERGENCY MEDICINE

## 2023-02-05 PROCEDURE — 99900035 HC TECH TIME PER 15 MIN (STAT)

## 2023-02-05 PROCEDURE — 94760 N-INVAS EAR/PLS OXIMETRY 1: CPT

## 2023-02-05 PROCEDURE — 93010 ELECTROCARDIOGRAM REPORT: CPT | Mod: ,,, | Performed by: INTERNAL MEDICINE

## 2023-02-05 PROCEDURE — 80048 BASIC METABOLIC PNL TOTAL CA: CPT | Performed by: EMERGENCY MEDICINE

## 2023-02-05 PROCEDURE — 87529 HSV DNA AMP PROBE: CPT | Mod: 59 | Performed by: EMERGENCY MEDICINE

## 2023-02-05 PROCEDURE — 93005 ELECTROCARDIOGRAM TRACING: CPT

## 2023-02-05 PROCEDURE — 94761 N-INVAS EAR/PLS OXIMETRY MLT: CPT

## 2023-02-05 PROCEDURE — 94640 AIRWAY INHALATION TREATMENT: CPT

## 2023-02-05 PROCEDURE — 99285 EMERGENCY DEPT VISIT HI MDM: CPT | Mod: 25

## 2023-02-05 PROCEDURE — 25000242 PHARM REV CODE 250 ALT 637 W/ HCPCS: Performed by: EMERGENCY MEDICINE

## 2023-02-05 PROCEDURE — 36600 WITHDRAWAL OF ARTERIAL BLOOD: CPT

## 2023-02-05 PROCEDURE — 36415 COLL VENOUS BLD VENIPUNCTURE: CPT | Performed by: EMERGENCY MEDICINE

## 2023-02-05 PROCEDURE — 84484 ASSAY OF TROPONIN QUANT: CPT | Performed by: EMERGENCY MEDICINE

## 2023-02-05 PROCEDURE — 87529 HSV DNA AMP PROBE: CPT | Performed by: EMERGENCY MEDICINE

## 2023-02-05 PROCEDURE — 63600175 PHARM REV CODE 636 W HCPCS: Performed by: EMERGENCY MEDICINE

## 2023-02-05 PROCEDURE — 87502 INFLUENZA DNA AMP PROBE: CPT | Performed by: EMERGENCY MEDICINE

## 2023-02-05 PROCEDURE — 82803 BLOOD GASES ANY COMBINATION: CPT

## 2023-02-05 PROCEDURE — 27000221 HC OXYGEN, UP TO 24 HOURS

## 2023-02-05 PROCEDURE — U0002 COVID-19 LAB TEST NON-CDC: HCPCS | Performed by: EMERGENCY MEDICINE

## 2023-02-05 RX ORDER — DEXAMETHASONE 4 MG/1
8 TABLET ORAL
Status: COMPLETED | OUTPATIENT
Start: 2023-02-05 | End: 2023-02-05

## 2023-02-05 RX ORDER — DEXAMETHASONE 4 MG/1
8 TABLET ORAL ONCE
Qty: 2 TABLET | Refills: 0 | Status: SHIPPED | OUTPATIENT
Start: 2023-02-06 | End: 2023-02-06

## 2023-02-05 RX ORDER — IPRATROPIUM BROMIDE AND ALBUTEROL SULFATE 2.5; .5 MG/3ML; MG/3ML
3 SOLUTION RESPIRATORY (INHALATION)
Status: COMPLETED | OUTPATIENT
Start: 2023-02-05 | End: 2023-02-05

## 2023-02-05 RX ADMIN — IPRATROPIUM BROMIDE AND ALBUTEROL SULFATE 3 ML: .5; 3 SOLUTION RESPIRATORY (INHALATION) at 09:02

## 2023-02-05 RX ADMIN — DEXAMETHASONE 8 MG: 4 TABLET ORAL at 10:02

## 2023-02-05 NOTE — ED PROVIDER NOTES
Chief complaint:  Groin Swelling      HPI:  Renard Jimenez is a 57 y.o. male with hx htn, COPD on home O2 2L NC still smoking and with pulmonary htn, CHF, and CAD s/p prior MI, TIA with known PFO presenting with complaint of pain of the skin around his penis.  Patient endorses prior circumcision but states his penis is usually retracted and he is developed sores over the last several day in the area surrounding that are painful.  He is voiding without difficulty but the area hurts when contacted by urine.  He denies specific dysuria, urinary frequency, urgency, or difficulty urinating.  No hematuria.  No rash elsewhere.  No testicular pain.  Denies similar problem in the past.  Oxygen levels on his normal 2 L nasal cannula in triage noted to be low and confirmed here on multiple readings on pulse ox.  He denies any new dyspnea, chest pain, cough, sputum production, hemoptysis, fever.  There is no new swelling.  He denies blood in the stools or dark stools.  He denies pleuritic pain.    ROS: As per HPI and below:  No headache, confusion, fever, rash apart from the skin around his penis.    Review of patient's allergies indicates:   Allergen Reactions    Dog dander Hives    House dust Hives    Maple flavor Other (See Comments)     Throat swelling      Pollen extracts Hives       Discharge Medication List as of 2/5/2023 10:35 AM        START taking these medications    Details   dexAMETHasone (DECADRON) 4 MG Tab Take 2 tablets (8 mg total) by mouth once. for 1 dose, Starting Mon 2/6/2023, Normal           CONTINUE these medications which have NOT CHANGED    Details   albuterol (PROVENTIL) 2.5 mg /3 mL (0.083 %) nebulizer solution Take 2.5 mg by nebulization every 4 (four) hours as needed., Starting Mon 10/31/2022, Historical Med      albuterol (PROVENTIL/VENTOLIN HFA) 90 mcg/actuation inhaler Inhale 2 puffs into the lungs every 6 (six) hours as needed., Starting Fri 10/28/2022, Historical Med      aspirin 81 MG Chew  Take 1 tablet (81 mg total) by mouth once daily., Starting Sat 11/12/2022, Until Sun 11/12/2023, Normal      atorvastatin (LIPITOR) 80 MG tablet Take 1 tablet (80 mg total) by mouth once daily., Starting Sat 11/12/2022, Until Sun 11/12/2023, Normal      buPROPion (WELLBUTRIN XL) 150 MG TB24 tablet Take 150 mg by mouth once daily., Historical Med      clopidogreL (PLAVIX) 75 mg tablet Take 1 tablet (75 mg total) by mouth once daily., Starting Sat 11/12/2022, Until Sun 11/12/2023, Normal      fluticasone furoate-vilanteroL (BREO) 200-25 mcg/dose DsDv diskus inhaler Breo Ellipta 200 mcg-25 mcg/dose powder for inhalation   Inhale by inhalation route for 30 days., Historical Med      gabapentin (NEURONTIN) 300 MG capsule Take 300 mg by mouth 2 (two) times daily., Historical Med      lisinopriL-hydrochlorothiazide (PRINZIDE,ZESTORETIC) 20-25 mg Tab lisinopril 20 mg-hydrochlorothiazide 25 mg tablet   TAKE ONE TABLET BY MOUTH EVERY DAY, Historical Med      metoprolol tartrate (LOPRESSOR) 25 MG tablet Take 1 tablet (25 mg total) by mouth 2 (two) times daily., Starting Fri 1/20/2023, Until Sat 1/20/2024, Normal      pantoprazole (PROTONIX) 40 MG tablet Take 40 mg by mouth once daily., Starting Sat 9/24/2022, Historical Med             PMH:  As per HPI and below:  Past Medical History:   Diagnosis Date    CHF (congestive heart failure)     COPD (chronic obstructive pulmonary disease)     HLD (hyperlipidemia)     Hypertension     MI, old      Past Surgical History:   Procedure Laterality Date    CAROTID ENDARTERECTOMY Left 11/17/2022    Procedure: ENDARTERECTOMY-CAROTID;  Surgeon: ELIDA Guadalupe II, MD;  Location: [facility] OR [address] [place] [address];  Service: Vascular;  Laterality: Left;    LAPAROSCOPIC CHOLECYSTECTOMY N/A 4/11/2022    Procedure: CHOLECYSTECTOMY, LAPAROSCOPIC;  Surgeon: Neville Hutchinson MD;  Location: [facility];  Service: General;  Laterality: N/A;    TONSILLECTOMY         Social History     Socioeconomic History    Marital  status:    Tobacco Use    Smoking status: Every Day     Packs/day: 1.00     Years: 30.00     Pack years: 30.00     Types: Cigarettes   Substance and Sexual Activity    Alcohol use: Not Currently    Drug use: Yes     Types: Marijuana     Comment: 3 to 4 times a week    Sexual activity: Yes     Partners: Female     Social Determinants of Health     Financial Resource Strain: Unknown    Difficulty of Paying Living Expenses: Patient refused   Food Insecurity: Unknown    Worried About Running Out of Food in the Last Year: Patient refused    Ran Out of Food in the Last Year: Patient refused   Transportation Needs: Unknown    Lack of Transportation (Medical): Patient refused    Lack of Transportation (Non-Medical): Patient refused   Physical Activity: Unknown    Days of Exercise per Week: Patient refused    Minutes of Exercise per Session: Patient refused   Stress: Unknown    Feeling of Stress : Patient refused   Social Connections: Unknown    Frequency of Communication with Friends and Family: Patient refused    Frequency of Social Gatherings with Friends and Family: Patient refused    Attends [employer] Services: Patient refused    Active Member of Clubs or Organizations: Patient refused    Marital Status:    Housing Stability: Unknown    Unable to Pay for Housing in the Last Year: Patient refused    Unstable Housing in the Last Year: Patient refused       Family History   Problem Relation Age of Onset    Hypertension Father        Physical Exam:    Vitals:    02/05/23 1018   BP: (!) 166/84   Pulse: 75   Resp: (!) 24   Temp:      GENERAL:  No apparent distress.  Alert.  Pink puffer appearance with nasal cannula in place.  Large body habitus.  HEENT:  Moist mucous membranes.  Normocephalic and atraumatic.    NECK:  No swelling.  Midline trachea.   CARDIOVASCULAR:  Regular rate and rhythm.  2+ radial pulses.  No murmur auscultated.  PULMONARY:  Coarse rhonchi bilaterally with slight expiratory wheeze.  No  prolonged expiratory phase.  No rales.  Patient speaks in complete sentences.  No accessory muscle use or retractions.  ABDOMEN:  Non-tender and non-distended.    EXTREMITIES:  Warm and well perfused.  Brisk capillary refill.  Trace pitting pedal edema bilaterally.  Legs are symmetric and nontender to palpation.  NEUROLOGICAL:  Normal mental status.  Appropriate and conversant.    SKIN:  No rashes part from area around the penis as detailed below.  :  Circumcised with bilateral descended, nontender testes.  No scrotal or perineal rash.  No scrotal or perineal tenderness including no testicular tenderness or epididymal tenderness.  Glans penis appears normal with retraction of the redundant tissue in developing his penis revealing multiple, tender ulcerations with overlying granulation tissue in a circumferential distribution around the area.  There is minimal erythema.  There is no penile discharge noted.  Visualized portion of the penile shaft as limited by patient pain appears to be free of lesions.    Labs Reviewed   CBC W/ AUTO DIFFERENTIAL - Abnormal; Notable for the following components:       Result Value     (*)     MCH 31.4 (*)     MCHC 31.2 (*)     Gran % 74.3 (*)     Lymph % 17.3 (*)     All other components within normal limits    Narrative:     Release to patient->Immediate   BASIC METABOLIC PANEL - Abnormal; Notable for the following components:    CO2 30 (*)     All other components within normal limits    Narrative:     Release to patient->Immediate   TROPONIN I - Abnormal; Notable for the following components:    Troponin I 0.091 (*)     All other components within normal limits    Narrative:     Release to patient->Immediate   B-TYPE NATRIURETIC PEPTIDE - Abnormal; Notable for the following components:     (*)     All other components within normal limits   ISTAT PROCEDURE - Abnormal; Notable for the following components:    POC PH 7.311 (*)     POC PCO2 67.8 (*)     POC PO2 48 (*)      POC HCO3 34.2 (*)     POC SATURATED O2 77 (*)     POC TCO2 36 (*)     All other components within normal limits   INFLUENZA A & B BY MOLECULAR   SARS-COV-2 RNA AMPLIFICATION, QUAL   HERPES SIMPLEX (HSV) BY RAPID PCR, NON-BLOOD    Narrative:     Sources by Resulting Lab:->[facility]  Release to patient->Immediate   RPR   HERPES SIMPLEX VIRUS (HSV) TYPE 1 & 2 DNA BY PCR       Discharge Medication List as of 2/5/2023 10:35 AM        START taking these medications    Details   dexAMETHasone (DECADRON) 4 MG Tab Take 2 tablets (8 mg total) by mouth once. for 1 dose, Starting Mon 2/6/2023, Normal           CONTINUE these medications which have NOT CHANGED    Details   albuterol (PROVENTIL) 2.5 mg /3 mL (0.083 %) nebulizer solution Take 2.5 mg by nebulization every 4 (four) hours as needed., Starting Mon 10/31/2022, Historical Med      albuterol (PROVENTIL/VENTOLIN HFA) 90 mcg/actuation inhaler Inhale 2 puffs into the lungs every 6 (six) hours as needed., Starting Fri 10/28/2022, Historical Med      aspirin 81 MG Chew Take 1 tablet (81 mg total) by mouth once daily., Starting Sat 11/12/2022, Until Sun 11/12/2023, Normal      atorvastatin (LIPITOR) 80 MG tablet Take 1 tablet (80 mg total) by mouth once daily., Starting Sat 11/12/2022, Until Sun 11/12/2023, Normal      buPROPion (WELLBUTRIN XL) 150 MG TB24 tablet Take 150 mg by mouth once daily., Historical Med      clopidogreL (PLAVIX) 75 mg tablet Take 1 tablet (75 mg total) by mouth once daily., Starting Sat 11/12/2022, Until Sun 11/12/2023, Normal      fluticasone furoate-vilanteroL (BREO) 200-25 mcg/dose DsDv diskus inhaler Breo Ellipta 200 mcg-25 mcg/dose powder for inhalation   Inhale by inhalation route for 30 days., Historical Med      gabapentin (NEURONTIN) 300 MG capsule Take 300 mg by mouth 2 (two) times daily., Historical Med      lisinopriL-hydrochlorothiazide (PRINZIDE,ZESTORETIC) 20-25 mg Tab lisinopril 20 mg-hydrochlorothiazide 25 mg tablet   TAKE ONE TABLET  BY MOUTH EVERY DAY, Historical Med      metoprolol tartrate (LOPRESSOR) 25 MG tablet Take 1 tablet (25 mg total) by mouth 2 (two) times daily., Starting Fri 1/20/2023, Until Sat 1/20/2024, Normal      pantoprazole (PROTONIX) 40 MG tablet Take 40 mg by mouth once daily., Starting Sat 9/24/2022, Historical Med             Orders Placed This Encounter   Procedures    Influenza A & B by Molecular    X-Ray Chest 1 View    Complete Blood Count (CBC)    Basic Metabolic Panel (BMP)    Troponin I    COVID-19 Rapid Screening    Brain natriuretic peptide    HSV by Rapid PCR, Non-Blood [facility]; Skin    POCT ARTERIAL BLOOD GAS Blood Gas    EKG 12-lead       Imaging Results               X-Ray Chest 1 View (Final result)  Result time 02/05/23 08:44:27      Final result by Jackson Styles MD (02/05/23 08:44:27)                   Impression:      As above    This report was flagged in Epic as abnormal.      Electronically signed by: Jackson Styles MD  Date:    02/05/2023  Time:    08:44               Narrative:    EXAMINATION:  XR CHEST 1 VIEW    CLINICAL HISTORY:  SOB;    TECHNIQUE:  Single frontal view of the chest was performed.    COMPARISON:  01/04/2023    FINDINGS:  Cardiac and mediastinal silhouettes are unchanged.    There is a congested appearance of the central pulmonary vasculature with diffuse prominence of the bronchovascular markings and pulmonary interstitium, concerning for early pulmonary edema.  No definite pleural effusion demonstrated.                                      ED Course as of 02/05/23 1542   Sun Feb 05, 2023   0814 EKG:  Sinus rhythm with PACs, rate of 78, normal intervals and axis.  There are no acute ST or T wave changes suggestive of acute ischemia or infarction.  Motion artifact noted. [MR]   1031 Patient's oxygen saturations now appropriate on his normal 2 L nasal cannula following breathing treatment. [MR]      ED Course User Index  [MR] Jackson Lopez MD       MDM:    57 y.o.  male with multiple issues with primary issue for presentation with painful ulcerations around the redundant tissue typically developing his penis.  He has prior circumcision.  He is voiding well.  He is no urinary complaints specifically and I doubt UTI.  Differential including syphilis although less likely secondary to pain of lesions as well as herpes among others considered.  RPR and herpes PCR sent.  Low suspicion for cellulitis at this point.  Ordinarily this would be appropriate for routine urology follow-up, but the patient has a separate issue of hypoxia on his normal oxygen.  ABG ordered to confirm.  Additional workup initially ordered to assess for potential etiology including EKG to exclude arrhythmia or ischemia; chest x-ray to assess for any sign of edema or new infiltrate; and laboratories to assess for end-organ dysfunction such as new anemia or change in renal function.      Patient has marked improvement in oxygenation following breathing treatment with DuoNeb given here.  This is highly suggestive of COPD related etiology or temporary atelectasis.  Mildly positive troponin noted and I have very low suspicion for acute cardiac process such as ACS or myocarditis.  I doubt acute heart failure.  I do not he requires admission for respiratory monitoring and once again workup was done for initial lower oxygen saturations but patient has no dyspnea or chest pain.  I do not he requires further long-term inpatient monitoring.  Close outpatient follow-up recommended.  Short course of steroids initiated with dose of dexamethasone here and 2nd dose tomorrow pending follow-up.  He is referred to Urology for ulcerations around his penis.  Return precautions reviewed for all complaints.    Diagnoses:    1. Skin ulcerations around penis  2. Hypoxia on normal O2 in setting of chronic COPD  3. COPD exacerbation       Jackson Lopez MD  02/05/23 [id number]

## 2023-02-07 LAB — RPR SER QL: NORMAL

## 2023-02-08 LAB
HSV-1 DNA BY PCR: NEGATIVE
HSV-2 DNA BY PCR: NEGATIVE
HSV1 DNA SPEC QL NAA+PROBE: NEGATIVE
HSV2 DNA SPEC QL NAA+PROBE: POSITIVE
SPECIMEN SOURCE: ABNORMAL

## 2023-02-09 ENCOUNTER — TELEPHONE (OUTPATIENT)
Dept: EMERGENCY MEDICINE | Facility: HOSPITAL | Age: 58
End: 2023-02-09
Payer: MEDICAID

## 2023-02-09 ENCOUNTER — TELEPHONE (OUTPATIENT)
Dept: SMOKING CESSATION | Facility: CLINIC | Age: 58
End: 2023-02-09
Payer: MEDICAID

## 2023-02-09 RX ORDER — VALACYCLOVIR HYDROCHLORIDE 1 G/1
1000 TABLET, FILM COATED ORAL EVERY 12 HOURS
Qty: 21 TABLET | Refills: 0 | Status: SHIPPED | OUTPATIENT
Start: 2023-02-09 | End: 2023-02-16

## 2023-02-09 NOTE — TELEPHONE ENCOUNTER
Telephoned patient for follow up after missed follow up.  Patient states he is not interested at this time.  Patient was given our contact information and coverage information

## 2023-02-13 PROBLEM — I63.9 STROKE: Status: RESOLVED | Noted: 2022-11-08 | Resolved: 2023-02-13

## 2023-02-13 PROBLEM — G45.9 TRANSIENT ISCHEMIC ATTACK: Status: RESOLVED | Noted: 2022-11-10 | Resolved: 2023-02-13

## 2023-02-13 PROBLEM — N17.9 AKI (ACUTE KIDNEY INJURY): Status: RESOLVED | Noted: 2022-11-09 | Resolved: 2023-02-13

## 2023-02-13 PROBLEM — J96.11 CHRONIC RESPIRATORY FAILURE WITH HYPOXIA: Chronic | Status: RESOLVED | Noted: 2022-11-09 | Resolved: 2023-02-13

## 2023-05-11 DIAGNOSIS — R06.00 DYSPNEA, UNSPECIFIED TYPE: Primary | ICD-10-CM

## 2023-05-19 DIAGNOSIS — F17.210 NICOTINE DEPENDENCE, CIGARETTES, UNCOMPLICATED: Primary | ICD-10-CM

## 2023-05-22 ENCOUNTER — HOSPITAL ENCOUNTER (OUTPATIENT)
Dept: PULMONOLOGY | Facility: HOSPITAL | Age: 58
Discharge: HOME | End: 2023-05-22
Attending: NURSE PRACTITIONER
Payer: MEDICAID

## 2023-05-22 DIAGNOSIS — R06.00 DYSPNEA, UNSPECIFIED TYPE: ICD-10-CM

## 2023-05-22 PROCEDURE — 94010 BREATHING CAPACITY TEST: CPT | Mod: XB

## 2023-05-22 PROCEDURE — 94060 EVALUATION OF WHEEZING: CPT

## 2023-05-22 PROCEDURE — 94727 GAS DIL/WSHOT DETER LNG VOL: CPT

## 2023-05-22 PROCEDURE — 94729 DIFFUSING CAPACITY: CPT

## 2023-05-29 ENCOUNTER — HOSPITAL ENCOUNTER (OUTPATIENT)
Dept: RADIOLOGY | Facility: HOSPITAL | Age: 58
Discharge: HOME | End: 2023-05-29
Attending: NURSE PRACTITIONER
Payer: MEDICAID

## 2023-05-29 DIAGNOSIS — F17.210 NICOTINE DEPENDENCE, CIGARETTES, UNCOMPLICATED: ICD-10-CM

## 2023-05-29 PROCEDURE — 71271 CT THORAX LUNG CANCER SCR C-: CPT | Mod: TC,PO

## 2023-06-06 ENCOUNTER — LAB VISIT (OUTPATIENT)
Dept: LAB | Facility: HOSPITAL | Age: 58
End: 2023-06-06
Attending: NURSE PRACTITIONER
Payer: MEDICAID

## 2023-06-06 DIAGNOSIS — J44.89 OBSTRUCTIVE CHRONIC BRONCHITIS WITHOUT EXACERBATION: Primary | ICD-10-CM

## 2023-06-06 PROCEDURE — 82785 ASSAY OF IGE: CPT | Performed by: NURSE PRACTITIONER

## 2023-06-06 PROCEDURE — 36415 COLL VENOUS BLD VENIPUNCTURE: CPT | Performed by: NURSE PRACTITIONER

## 2023-06-10 LAB
A ALTERNATA IGE QN: 0.3 KU/L
A FUMIGATUS IGE QN: 1.5 KU/L
ALLERGEN CLASS DESCRIPTION: ABNORMAL
AMER ROACH IGE QN: 0.36 KU/L
BAHIA GRASS IGE QN: <0.1 KU/L
BERMUDA GRASS IGE QN: <0.1 KU/L
BOXELDER IGE QN: 0.1 KU/L
C HERBARUM IGE QN: 0.42 KU/L
CAT DANDER IGE QN: <0.1 KU/L
CMN PIGWEED IGE QN: <0.1 KU/L
COMMON RAGWEED IGE QN: 0.56 KU/L
D FARINAE IGE QN: 1.35 KU/L
D PTERONYSS IGE QN: 1.88 KU/L
DOG DANDER IGE QN: 0.53 KU/L
ENGL PLANTAIN IGE QN: <0.1 KU/L
IGE: 1982 IU/ML (ref 6–495)
IGE: 1982 IU/ML (ref 6–495)
JOHNSON GRASS IGE QN: 0.1 KU/L
LONDON PLANE IGE QN: <0.1 KU/L
M RACEMOSUS IGE QN: 0.46 KU/L
MT JUNIPER IGE QN: 0.16 KU/L
MUGWORT IGE QN: <0.1 KU/L
NETTLE IGE QN: 0.35 KU/L
P NOTATUM IGE QN: 0.89 KU/L
S BOTRYOSUM IGE QN: 1.26 KU/L
SHEEP SORREL IGE QN: <0.1 KU/L
SWEET GUM IGE QN: <0.1 KU/L
TIMOTHY IGE QN: <0.1 KU/L
WHITE ELM IGE QN: 0.12 KU/L
WHITE HICKORY IGE QN: <0.1 KU/L
WHITE MULBERRY IGE QN: <0.1 KU/L
WHITE OAK IGE QN: 0.14 KU/L

## 2023-08-10 NOTE — NURSING
"Discharge instructions given to the patient.  Patient voiced understanding. IV removed per nurse extender. Officer in the facility. Officer asked what was the protocol for getting new medication because patient wants wife to get prescriptions filled and sent to residential.  The officer stated " The nurse comes once a week so its okay that you give the prescriptions to his wife." Wife at nurses desk and prescriptions given to wife. Patient escorted out facility. Skin warm and dry to touch. Respirations even and unlabored. No acute distress or discomfort noted. Patient in stable condition. Discharge paperwork with officer.  "
Alert-The patient is alert, awake and responds to voice. The patient is oriented to time, place, and person. The triage nurse is able to obtain subjective information.

## 2024-05-16 DIAGNOSIS — M54.50 LOW BACK PAIN, UNSPECIFIED: Primary | ICD-10-CM

## 2024-06-27 NOTE — DISCHARGE INSTRUCTIONS
Hold Plavix for 5 days prior to surgery.      Skin lesions, uncertain cause, some tests are pending.  Follow up closely with urology.    Low oxygen level improved after breathing treatment here.    As we discussed, return to the emergency department for new or worsening symptoms including new shortness of breath, chest pain, or spreading rash.

## 2024-06-28 ENCOUNTER — HOSPITAL ENCOUNTER (EMERGENCY)
Facility: HOSPITAL | Age: 59
Discharge: HOME | End: 2024-06-28
Attending: EMERGENCY MEDICINE
Payer: MEDICAID

## 2024-06-28 VITALS
HEIGHT: 70 IN | BODY MASS INDEX: 42.66 KG/M2 | HEART RATE: 98 BPM | DIASTOLIC BLOOD PRESSURE: 64 MMHG | OXYGEN SATURATION: 94 % | WEIGHT: 298 LBS | SYSTOLIC BLOOD PRESSURE: 113 MMHG | RESPIRATION RATE: 20 BRPM | TEMPERATURE: 98 F
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
## 2024-06-28 DIAGNOSIS — M25.552 LEFT HIP PAIN: ICD-10-CM

## 2024-06-28 DIAGNOSIS — S86.812A PATELLAR TENDON STRAIN, LEFT, INITIAL ENCOUNTER: ICD-10-CM

## 2024-06-28 DIAGNOSIS — M25.562 LEFT KNEE PAIN: ICD-10-CM

## 2024-06-28 DIAGNOSIS — L01.00 IMPETIGO: ICD-10-CM

## 2024-06-28 DIAGNOSIS — M54.50 LOW BACK PAIN: ICD-10-CM

## 2024-06-28 PROCEDURE — 25000003 PHARM REV CODE 250: Performed by: EMERGENCY MEDICINE

## 2024-06-28 PROCEDURE — 96372 THER/PROPH/DIAG INJ SC/IM: CPT | Performed by: EMERGENCY MEDICINE

## 2024-06-28 PROCEDURE — 99284 EMERGENCY DEPT VISIT MOD MDM: CPT | Mod: 25

## 2024-06-28 PROCEDURE — 63600175 PHARM REV CODE 636 W HCPCS: Performed by: EMERGENCY MEDICINE

## 2024-06-28 RX ORDER — DOXYCYCLINE 100 MG/1
100 CAPSULE ORAL 2 TIMES DAILY
Qty: 20 CAPSULE | Refills: 0 | Status: SHIPPED | OUTPATIENT
Start: 2024-06-28 | End: 2024-07-08

## 2024-06-28 RX ORDER — DOXYCYCLINE HYCLATE 100 MG
100 TABLET ORAL
Status: COMPLETED | OUTPATIENT
Start: 2024-06-28 | End: 2024-06-28

## 2024-06-28 RX ORDER — ACETAMINOPHEN 500 MG
1000 TABLET ORAL EVERY 6 HOURS PRN
Qty: 60 TABLET | Refills: 0 | Status: SHIPPED | OUTPATIENT
Start: 2024-06-28

## 2024-06-28 RX ORDER — NAPROXEN 500 MG/1
500 TABLET ORAL 2 TIMES DAILY WITH MEALS
Qty: 20 TABLET | Refills: 0 | Status: SHIPPED | OUTPATIENT
Start: 2024-06-28 | End: 2024-07-08

## 2024-06-28 RX ORDER — MUPIROCIN 20 MG/G
OINTMENT TOPICAL 3 TIMES DAILY
Qty: 15 G | Refills: 1 | Status: SHIPPED | OUTPATIENT
Start: 2024-06-28

## 2024-06-28 RX ORDER — KETOROLAC TROMETHAMINE 30 MG/ML
30 INJECTION, SOLUTION INTRAMUSCULAR; INTRAVENOUS
Status: COMPLETED | OUTPATIENT
Start: 2024-06-28 | End: 2024-06-28

## 2024-06-28 RX ADMIN — KETOROLAC TROMETHAMINE 30 MG: 30 INJECTION, SOLUTION INTRAMUSCULAR at 07:06

## 2024-06-28 RX ADMIN — DOXYCYCLINE HYCLATE 100 MG: 100 TABLET, COATED ORAL at 07:06

## 2024-06-29 NOTE — ED PROVIDER NOTES
Encounter Date: 6/28/2024       History     Chief Complaint   Patient presents with    Knee Injury     Left knee x 2 weeks after moving a water heater. Left hip and lower back hurt        Emergent eval of 58-year-old male with COPD on 3 L home oxygen continues to smoke 1 pack per day, CHF, prior MI, hyperlipidemia, hypertension, severe obesity, bilateral carotid stenosis symptomatic status post left endarterectomy presents to the ER due to 2 weeks of left knee pain superior to the patella with difficulty with extending the leg and standing from seated position.  He has been able to ambulate with difficulty.  He also has pain in the left hip and back.  This has been present for 2 weeks since lifting a hot water heater that is full of water while they were attempting to move.  He has been taking his gabapentin, took 800 mg ibuprofen once yesterday without improvement.  He also is having pain in the low back in the left hip.  He reports that he was also been getting bit by multiple mosquitoes it was covered with mosquito bites with signs of second-degree infection consistent with impetigo.  And has been increased swelling to lower extremities redness of lower extremities more so than baseline.  He reports no fevers chills sweats reports no shortness of breath more so than baseline is continuing to smoke.  No new cough.          Review of patient's allergies indicates:   Allergen Reactions    Dog dander Hives    House dust Hives    Maple flavor Other (See Comments)     Throat swelling      Pollen extracts Hives     Past Medical History:   Diagnosis Date    CHF (congestive heart failure)     COPD (chronic obstructive pulmonary disease)     HLD (hyperlipidemia)     Hypertension     MI, old      Past Surgical History:   Procedure Laterality Date    CAROTID ENDARTERECTOMY Left 11/17/2022    Procedure: ENDARTERECTOMY-CAROTID;  Surgeon: ELIDA Guadalupe II, MD;  Location: [facility] OR [address] [place] [address];  Service: Vascular;  Laterality:  Left;    LAPAROSCOPIC CHOLECYSTECTOMY N/A 4/11/2022    Procedure: CHOLECYSTECTOMY, LAPAROSCOPIC;  Surgeon: Neville Hutchinson MD;  Location: [facility];  Service: General;  Laterality: N/A;    TONSILLECTOMY       Family History   Problem Relation Name Age of Onset    Hypertension Father       Social History     Tobacco Use    Smoking status: Every Day     Current packs/day: 1.00     Average packs/day: 1 pack/day for 30.0 years (30.0 ttl pk-yrs)     Types: Cigarettes   Substance Use Topics    Alcohol use: Not Currently    Drug use: Yes     Types: Marijuana     Comment: 3 to 4 times a week     Review of Systems   Constitutional:  Negative for activity change, appetite change, chills, diaphoresis, fatigue and fever.   Respiratory:  Negative for cough, chest tightness, shortness of breath and wheezing.    Cardiovascular:  Positive for leg swelling. Negative for chest pain and palpitations.   Gastrointestinal:  Negative for abdominal pain, constipation, diarrhea, nausea and vomiting.   Genitourinary:  Negative for dysuria, frequency and urgency.   Musculoskeletal:  Positive for arthralgias, back pain, joint swelling and myalgias. Negative for neck pain and neck stiffness.   Skin:  Positive for color change, rash and wound. Negative for pallor.   Neurological:  Negative for dizziness, weakness, light-headedness, numbness and headaches.   All other systems reviewed and are negative.      Physical Exam     Initial Vitals [06/28/24 1813]   BP Pulse Resp Temp SpO2   113/64 80 (!) 24 98.2 °F (36.8 °C) (!) 90 %      MAP       --         Physical Exam    Nursing note and vitals reviewed.  Constitutional: He appears well-developed and well-nourished. He is not diaphoretic. No distress.   Morbidly obese   HENT:   Head: Normocephalic and atraumatic.   Right Ear: External ear normal.   Left Ear: External ear normal.   Nose: Nose normal.   Mouth/Throat: Oropharynx is clear and moist.   Multiple insect bites to neck ears cheeks with  impetigo to the left ear   Eyes: Conjunctivae and EOM are normal. Pupils are equal, round, and reactive to light.   Neck: Neck supple. No tracheal deviation present.   Normal range of motion.  Cardiovascular:  Normal rate, regular rhythm, normal heart sounds and intact distal pulses.     Exam reveals no gallop and no friction rub.       No murmur heard.  Hr 80 bp 113/64   Pulmonary/Chest: Breath sounds normal. No stridor. No respiratory distress. He has no wheezes. He has no rhonchi. He has no rales. He exhibits no tenderness.   90% 3 liters NC rhonchi thoughout    Abdominal: Abdomen is soft. Bowel sounds are normal. He exhibits no distension and no mass. There is no abdominal tenderness. There is no rebound and no guarding.   Musculoskeletal:         General: Tenderness and edema present.      Cervical back: Normal range of motion and neck supple.        Back:       Left hip: Tenderness and bony tenderness present. No deformity, lacerations or crepitus. Decreased range of motion. Normal strength.      Left upper leg: Tenderness present.      Left knee: Bony tenderness present. No swelling, deformity, effusion, erythema, ecchymosis, lacerations or crepitus. Normal range of motion. Tenderness present over the patellar tendon. No medial joint line, lateral joint line, MCL, LCL, ACL or PCL tenderness. Normal alignment and normal patellar mobility. Normal pulse.      Right lower leg: No swelling, deformity, lacerations, tenderness or bony tenderness. Edema present.      Left lower leg: No swelling, deformity, lacerations, tenderness or bony tenderness. Edema present.      Right ankle: Swelling present.      Left ankle: Swelling present.      Right foot: Swelling present.      Left foot: Swelling present.        Legs:       Comments: 2+ pitting edema to bilateral lower extremities to the level of the thighs.         Neurological: He is alert and oriented to person, place, and time. He has normal strength. No cranial  nerve deficit or sensory deficit.   Skin: Skin is warm and dry. No rash noted. There is erythema. No pallor.   Multiple mosquito bites to bilateral upper and lower extremities face and neck.  Someone signs of impetigo development and cellulitis to lower extremities as well as chronic venous stasis dermatitis   Psychiatric: He has a normal mood and affect. His behavior is normal. Judgment and thought content normal.         ED Course   Procedures  Labs Reviewed - No data to display       Imaging Results              X-Ray Lumbar Spine Ap And Lateral (In process)  Result time 06/28/24 20:01:05   Procedure changed from X-Ray Lumbar Spine 5 View                    X-Ray Hip 2 or 3 views Left with Pelvis when performed (In process)                      X-Ray Sacrum And Coccyx (In process)                      X-Ray Knee 3 View Left (In process)                      Medications   ketorolac injection 30 mg (30 mg Intramuscular Given 6/28/24 1935)   doxycycline tablet 100 mg (100 mg Oral Given 6/28/24 1935)     Medical Decision Making  Emergent eval of 58-year-old male with COPD on 3 L home oxygen continues to smoke 1 pack per day, CHF, prior MI, hyperlipidemia, hypertension, severe obesity, bilateral carotid stenosis symptomatic status post left endarterectomy presents to the ER due to 2 weeks of left knee pain superior to the patella with difficulty with extending the leg and standing from seated position.  He has been able to ambulate with difficulty.  He also has pain in the left hip and back.  This has been present for 2 weeks since lifting a hot water heater that is full of water while they were attempting to move.  He has been taking his gabapentin, took 800 mg ibuprofen once yesterday without improvement.  He also is having pain in the low back in the left hip.  He reports that he was also been getting bit by multiple mosquitoes it was covered with mosquito bites with signs of second-degree infection consistent with  impetigo.  And has been increased swelling to lower extremities redness of lower extremities more so than baseline.  He reports no fevers chills sweats reports no shortness of breath more so than baseline is continuing to smoke.  No new cough.  On physical exam patient was morbidly obese, wearing 3 L of oxygen sats were 90% with a raspy voice and cough.  Rhonchi throughout.  Patient reports this is baseline.  Multiple insect bites to his entire body some with signs of impetigo.  And 2+ pitting edema to the lower extremities consistent with venous stasis thrombosis as well as possible cellulitis.  Patient is a red haired fair skinned person and also is sunburn so it was difficult to tell whether this is cellulitis versus chronic venous stasis versus sunburn.  Patient was tenderness to the suprapatellar region but no defect.  Decreased strength 4/5 with with pain on extension.  No pain on flexion.  No pain throughout the rest of the knee.  Pain on palpation of the entire left hip low back and sacral region  5/5 strength Browning and plantar flexion normal pulses normal sensation  MDM    Patient presents for emergent evaluation of acute left knee pain, left hip pain, low back pain for 2 weeks that poses a threat to life and/or bodily function.   Differential diagnosis includes but was not limited to left knee fracture tib-fib fracture distal femur fracture sprain strain ligamentous injury, hip fracture contusion sprain, worsening arthritic change of lumbar spine, disc disease, cauda equina, impetigo, second-degree infection due to insect bites, cellulitis  In the ED patient found to have acute   [M25.562] Left knee pain  [M25.552] Left hip pain  [M54.50] Low back pain  [W57.XXXA] Insect bites and stings (Primary)  [L01.00] Impetigo  [S86.812A] Patellar tendon strain, left, initial encounter  I ordered X-rays and personally reviewed them and reviewed the radiologist interpretation.  Xray significant for x-ray of lumbar  spine, coccyx and sacrum with degenerative changes no acute fracture, x-ray left knee and left hip reveal no acute fracture or dislocation    Discharge MDM     Patient was managed in the ED with IV Toradol 30 mg IM, doxycycline 100 mg orally.  Patient was offered IV placement lab work and further evaluation of t they edema in the lower extremities to evaluate for more severe infection CHF and other etiologies the patient reports he was not want an IV at this time  The response to treatment was good    Patient was discharged in stable condition.  Detailed return precautions discussed.  Patient was told to follow up with primary care physician or specialist based on their diagnosis  Sepideh Calvin MD      Amount and/or Complexity of Data Reviewed  Radiology: ordered.    Risk  OTC drugs.  Prescription drug management.    Dictation #1  MRN:[id number]  CSN:[id number]                                   Clinical Impression:  Final diagnoses:  [M25.562] Left knee pain  [M25.552] Left hip pain  [M54.50] Low back pain  [W57.XXXA] Insect bites and stings (Primary)  [L01.00] Impetigo  [S86.812A] Patellar tendon strain, left, initial encounter          ED Disposition Condition    Discharge Stable          ED Prescriptions       Medication Sig Dispense Start Date End Date Auth. Provider    doxycycline (VIBRAMYCIN) 100 MG Cap Take 1 capsule (100 mg total) by mouth 2 (two) times daily. for 10 days 20 capsule 6/28/2024 7/8/2024 Sepideh Calvin MD    naproxen (NAPROSYN) 500 MG tablet Take 1 tablet (500 mg total) by mouth 2 (two) times daily with meals. for 10 days 20 tablet 6/28/2024 7/8/2024 Sepideh Calvin MD    mupirocin (BACTROBAN) 2 % ointment Apply topically 3 (three) times daily. 15 g 6/28/2024 -- Sepideh Calvin MD    acetaminophen (TYLENOL) 500 MG tablet Take 2 tablets (1,000 mg total) by mouth every 6 (six) hours as needed for Pain. 60 tablet 6/28/2024 -- Sepideh Calvin MD          Follow-up Information        Follow up With Specialties Details Why Contact Info Additional Information    Juancho Ortiz MD Internal Medicine Schedule an appointment as soon as possible for a visit  If your symptoms do not improve [address] [place] [address]  [phone]       [facility] Emergency Medicine Go to  If symptoms worsen [address] [place] [address] [address] [place] [address] 1st floor    Munir Castellanos MD Orthopedic Surgery Call  Monday for soonest available appointment [address] [facility] [address] [place] [address]  [phone]                Sepideh Calvin MD  06/28/24 7342

## 2024-06-29 NOTE — ED NOTES
Renard Jiemnez presents to the ED with a c/o left knee and hip pain x2 weeks.  Pt reports that he was working on his hot water heater and hurt his left leg.  No other complaints at this time. Bed low and locked. Pt connected to BP cuff and pulse ox.  Call light within reach.

## 2024-07-01 DIAGNOSIS — R06.00 DYSPNEA, UNSPECIFIED TYPE: Primary | ICD-10-CM

## 2024-07-01 DIAGNOSIS — F17.210 NICOTINE DEPENDENCE, CIGARETTES, UNCOMPLICATED: Primary | ICD-10-CM

## 2024-07-15 ENCOUNTER — HOSPITAL ENCOUNTER (OUTPATIENT)
Dept: RADIOLOGY | Facility: HOSPITAL | Age: 59
Discharge: HOME | End: 2024-07-15
Attending: NURSE PRACTITIONER
Payer: MEDICAID

## 2024-07-15 ENCOUNTER — HOSPITAL ENCOUNTER (OUTPATIENT)
Dept: PULMONOLOGY | Facility: HOSPITAL | Age: 59
Discharge: HOME | End: 2024-07-15
Attending: NURSE PRACTITIONER
Payer: MEDICAID

## 2024-07-15 DIAGNOSIS — R06.00 DYSPNEA, UNSPECIFIED TYPE: ICD-10-CM

## 2024-07-15 DIAGNOSIS — F17.210 NICOTINE DEPENDENCE, CIGARETTES, UNCOMPLICATED: ICD-10-CM

## 2024-07-15 LAB
DLCO SINGLE BREATH LLN: 22.23
DLCO SINGLE BREATH PRE REF: 42.2 %
DLCO SINGLE BREATH REF: 29.15
DLCOC SBVA LLN: 2.91
DLCOC SBVA REF: 4.09
DLCOC SINGLE BREATH LLN: 22.23
DLCOC SINGLE BREATH REF: 29.15
DLCOVA LLN: 2.91
DLCOVA PRE REF: 118.9 %
DLCOVA PRE: 4.87 ML/(MIN*MMHG*L) (ref 2.91–5.27)
DLCOVA REF: 4.09
ERV LLN: -16448.81
ERV PRE REF: 24.5 %
ERV REF: 1.19
FEF 25 75 CHG: 14.7 %
FEF 25 75 LLN: 1.86
FEF 25 75 POST REF: 12.2 %
FEF 25 75 PRE REF: 10.6 %
FEF 25 75 REF: 3.57
FET100 CHG: 19.6 %
FEV1 CHG: 18.5 %
FEV1 FVC CHG: 4.2 %
FEV1 FVC LLN: 65
FEV1 FVC POST REF: 70.9 %
FEV1 FVC PRE REF: 68 %
FEV1 FVC REF: 77
FEV1 LLN: 2.71
FEV1 POST REF: 31.8 %
FEV1 PRE REF: 26.8 %
FEV1 REF: 3.59
FRCPLETH LLN: 2.62
FRCPLETH PREREF: 98 %
FRCPLETH REF: 3.61
FVC CHG: 13.8 %
FVC LLN: 3.54
FVC POST REF: 44.7 %
FVC PRE REF: 39.3 %
FVC REF: 4.64
IVC PRE: 1.54 L (ref 3.54–5.76)
IVC SINGLE BREATH LLN: 3.54
IVC SINGLE BREATH PRE REF: 33.1 %
IVC SINGLE BREATH REF: 4.64
MVV LLN: 116
MVV PRE REF: 23 %
MVV REF: 137
PEF CHG: 2.9 %
PEF LLN: 6.91
PEF POST REF: 33.1 %
PEF PRE REF: 32.1 %
PEF REF: 9.22
POST FEF 25 75: 0.44 L/S (ref 1.86–5.28)
POST FET 100: 11.88 SEC
POST FEV1 FVC: 54.94 % (ref 65.48–87.92)
POST FEV1: 1.14 L (ref 2.71–4.42)
POST FVC: 2.08 L (ref 3.54–5.76)
POST PEF: 3.05 L/S (ref 6.91–11.54)
PRE DLCO: 12.3 ML/(MIN*MMHG) (ref 22.23–36.08)
PRE ERV: 0.29 L (ref -16448.81–16451.19)
PRE FEF 25 75: 0.38 L/S (ref 1.86–5.28)
PRE FET 100: 9.94 SEC
PRE FEV1 FVC: 52.72 % (ref 65.48–87.92)
PRE FEV1: 0.96 L (ref 2.71–4.42)
PRE FRC PL: 3.54 L (ref 2.62–4.6)
PRE FVC: 1.82 L (ref 3.54–5.76)
PRE MVV: 31.47 L/MIN (ref 116.12–157.11)
PRE PEF: 2.96 L/S (ref 6.91–11.54)
PRE RV: 3.25 L (ref 1.74–3.09)
PRE TLC: 5.07 L (ref 5.97–8.28)
RAW LLN: 3.06
RAW PRE REF: 282.6 %
RAW PRE: 8.65 CMH2O*S/L (ref 3.06–3.06)
RAW REF: 3.06
RV LLN: 1.74
RV PRE REF: 134.2 %
RV REF: 2.42
RVTLC LLN: 28
RVTLC PRE REF: 171.3 %
RVTLC PRE: 64.02 % (ref 28.38–46.34)
RVTLC REF: 37
TLC LLN: 5.97
TLC PRE REF: 71.2 %
TLC REF: 7.13
VA PRE: 2.53 L (ref 6.98–6.98)
VA SINGLE BREATH LLN: 6.98
VA SINGLE BREATH PRE REF: 36.2 %
VA SINGLE BREATH REF: 6.98
VC LLN: 3.54
VC PRE REF: 39.3 %
VC PRE: 1.82 L (ref 3.54–5.76)
VC REF: 4.64

## 2024-07-15 PROCEDURE — 94729 DIFFUSING CAPACITY: CPT | Mod: 26,,, | Performed by: INTERNAL MEDICINE

## 2024-07-15 PROCEDURE — 94060 EVALUATION OF WHEEZING: CPT

## 2024-07-15 PROCEDURE — 94726 PLETHYSMOGRAPHY LUNG VOLUMES: CPT | Mod: 26,,, | Performed by: INTERNAL MEDICINE

## 2024-07-15 PROCEDURE — 94729 DIFFUSING CAPACITY: CPT

## 2024-07-15 PROCEDURE — 94726 PLETHYSMOGRAPHY LUNG VOLUMES: CPT

## 2024-07-15 PROCEDURE — 71271 CT THORAX LUNG CANCER SCR C-: CPT | Mod: TC

## 2024-07-15 PROCEDURE — 71271 CT THORAX LUNG CANCER SCR C-: CPT | Mod: 26,,, | Performed by: RADIOLOGY

## 2024-07-15 PROCEDURE — 94060 EVALUATION OF WHEEZING: CPT | Mod: 26,,, | Performed by: INTERNAL MEDICINE

## 2024-07-15 RX ORDER — ALBUTEROL SULFATE 2.5 MG/.5ML
SOLUTION RESPIRATORY (INHALATION)
Status: DISCONTINUED
Start: 2024-07-15 | End: 2024-07-16 | Stop reason: HOSPADM

## 2024-12-28 ENCOUNTER — HOSPITAL ENCOUNTER (INPATIENT)
Facility: HOSPITAL | Age: 59
LOS: 5 days | Discharge: HOME HEALTH | DRG: 579 | End: 2025-01-02
Attending: STUDENT IN AN ORGANIZED HEALTH CARE EDUCATION/TRAINING PROGRAM | Admitting: INTERNAL MEDICINE
Payer: MEDICAID

## 2024-12-28 DIAGNOSIS — I50.9 CHF (CONGESTIVE HEART FAILURE): ICD-10-CM

## 2024-12-28 DIAGNOSIS — Z78.9 ADMITTED TO INTENSIVE CARE UNIT: ICD-10-CM

## 2024-12-28 DIAGNOSIS — S91.341A PUNCTURE WOUND OF RIGHT FOOT WITH FOREIGN BODY, INITIAL ENCOUNTER: ICD-10-CM

## 2024-12-28 DIAGNOSIS — L08.9 RIGHT FOOT INFECTION: ICD-10-CM

## 2024-12-28 DIAGNOSIS — R06.00 DYSPNEA: ICD-10-CM

## 2024-12-28 DIAGNOSIS — J96.01 ACUTE RESPIRATORY FAILURE WITH HYPOXIA AND HYPERCAPNIA: Primary | ICD-10-CM

## 2024-12-28 DIAGNOSIS — M79.671 FOOT PAIN, RIGHT: ICD-10-CM

## 2024-12-28 DIAGNOSIS — T14.8XXA PUNCTURE WOUND: ICD-10-CM

## 2024-12-28 DIAGNOSIS — I16.1 HYPERTENSIVE EMERGENCY: ICD-10-CM

## 2024-12-28 DIAGNOSIS — J96.02 ACUTE RESPIRATORY FAILURE WITH HYPOXIA AND HYPERCAPNIA: Primary | ICD-10-CM

## 2024-12-28 DIAGNOSIS — L03.115 CELLULITIS OF RIGHT LOWER EXTREMITY: ICD-10-CM

## 2024-12-28 LAB
ALBUMIN SERPL BCP-MCNC: 3.6 G/DL (ref 3.5–5.2)
ALLENS TEST: ABNORMAL
ALP SERPL-CCNC: 88 U/L (ref 40–150)
ALT SERPL W/O P-5'-P-CCNC: 40 U/L (ref 10–44)
ANION GAP SERPL CALC-SCNC: 7 MMOL/L (ref 8–16)
AST SERPL-CCNC: 26 U/L (ref 10–40)
BASOPHILS # BLD AUTO: 0.03 K/UL (ref 0–0.2)
BASOPHILS NFR BLD: 0.3 % (ref 0–1.9)
BILIRUB SERPL-MCNC: 1.4 MG/DL (ref 0.1–1)
BNP SERPL-MCNC: 676 PG/ML (ref 0–99)
BUN SERPL-MCNC: 12 MG/DL (ref 6–20)
CALCIUM SERPL-MCNC: 9.1 MG/DL (ref 8.7–10.5)
CHLORIDE SERPL-SCNC: 93 MMOL/L (ref 95–110)
CO2 SERPL-SCNC: 32 MMOL/L (ref 23–29)
CREAT SERPL-MCNC: 0.8 MG/DL (ref 0.5–1.4)
CRP SERPL-MCNC: 41.5 MG/L (ref 0–8.2)
DELSYS: ABNORMAL
DIFFERENTIAL METHOD BLD: ABNORMAL
EOSINOPHIL # BLD AUTO: 0 K/UL (ref 0–0.5)
EOSINOPHIL NFR BLD: 0 % (ref 0–8)
EP: 7
EP: 7
ERYTHROCYTE [DISTWIDTH] IN BLOOD BY AUTOMATED COUNT: 14.5 % (ref 11.5–14.5)
ERYTHROCYTE [SEDIMENTATION RATE] IN BLOOD BY WESTERGREN METHOD: 2 MM/HR (ref 0–10)
EST. GFR  (NO RACE VARIABLE): >60 ML/MIN/1.73 M^2
FIO2: 50
FIO2: 50
FLOW: 4
GLUCOSE SERPL-MCNC: 105 MG/DL (ref 70–110)
HCO3 UR-SCNC: 33.7 MMOL/L (ref 24–28)
HCO3 UR-SCNC: 34.9 MMOL/L (ref 24–28)
HCO3 UR-SCNC: 36.3 MMOL/L (ref 24–28)
HCT VFR BLD AUTO: 51.2 % (ref 40–54)
HCV AB SERPL QL IA: NEGATIVE
HGB BLD-MCNC: 15.9 G/DL (ref 14–18)
HIV 1+2 AB+HIV1 P24 AG SERPL QL IA: NEGATIVE
IMM GRANULOCYTES # BLD AUTO: 0.02 K/UL (ref 0–0.04)
IMM GRANULOCYTES NFR BLD AUTO: 0.2 % (ref 0–0.5)
INFLUENZA A, MOLECULAR: NEGATIVE
INFLUENZA B, MOLECULAR: NEGATIVE
IP: 14
IP: 14
LYMPHOCYTES # BLD AUTO: 1.1 K/UL (ref 1–4.8)
LYMPHOCYTES NFR BLD: 12.4 % (ref 18–48)
MCH RBC QN AUTO: 32.4 PG (ref 27–31)
MCHC RBC AUTO-ENTMCNC: 31.1 G/DL (ref 32–36)
MCV RBC AUTO: 104 FL (ref 82–98)
MODE: ABNORMAL
MONOCYTES # BLD AUTO: 0.7 K/UL (ref 0.3–1)
MONOCYTES NFR BLD: 7.4 % (ref 4–15)
NEUTROPHILS # BLD AUTO: 7.1 K/UL (ref 1.8–7.7)
NEUTROPHILS NFR BLD: 79.7 % (ref 38–73)
NRBC BLD-RTO: 0 /100 WBC
PCO2 BLDA: 68.1 MMHG (ref 35–45)
PCO2 BLDA: 70.9 MMHG (ref 35–45)
PCO2 BLDA: 77.4 MMHG (ref 35–45)
PH SMN: 7.26 [PH] (ref 7.35–7.45)
PH SMN: 7.3 [PH] (ref 7.35–7.45)
PH SMN: 7.32 [PH] (ref 7.35–7.45)
PLATELET # BLD AUTO: 213 K/UL (ref 150–450)
PMV BLD AUTO: 10.1 FL (ref 9.2–12.9)
PO2 BLDA: 46 MMHG (ref 40–60)
PO2 BLDA: 69 MMHG (ref 80–100)
PO2 BLDA: 72 MMHG (ref 80–100)
POC BE: 10 MMOL/L
POC BE: 7 MMOL/L
POC BE: 8 MMOL/L
POC SATURATED O2: 73 % (ref 95–100)
POC SATURATED O2: 91 % (ref 95–100)
POC SATURATED O2: 92 % (ref 95–100)
POC TCO2: 36 MMOL/L (ref 23–27)
POC TCO2: 37 MMOL/L (ref 24–29)
POC TCO2: 38 MMOL/L (ref 23–27)
POTASSIUM SERPL-SCNC: 5.1 MMOL/L (ref 3.5–5.1)
PROT SERPL-MCNC: 7.3 G/DL (ref 6–8.4)
RBC # BLD AUTO: 4.91 M/UL (ref 4.6–6.2)
RSV AG SPEC QL IA: NEGATIVE
SAMPLE: ABNORMAL
SARS-COV-2 RDRP RESP QL NAA+PROBE: NEGATIVE
SITE: ABNORMAL
SODIUM SERPL-SCNC: 132 MMOL/L (ref 136–145)
SP02: 90
SP02: 95
SP02: 96
SPECIMEN SOURCE: NORMAL
SPECIMEN SOURCE: NORMAL
SPONT RATE: 24
SPONT RATE: 25
TROPONIN I SERPL DL<=0.01 NG/ML-MCNC: 0.01 NG/ML (ref 0–0.03)
TROPONIN I SERPL DL<=0.01 NG/ML-MCNC: <0.006 NG/ML (ref 0–0.03)
WBC # BLD AUTO: 8.87 K/UL (ref 3.9–12.7)

## 2024-12-28 PROCEDURE — 84484 ASSAY OF TROPONIN QUANT: CPT | Mod: 91 | Performed by: STUDENT IN AN ORGANIZED HEALTH CARE EDUCATION/TRAINING PROGRAM

## 2024-12-28 PROCEDURE — 80053 COMPREHEN METABOLIC PANEL: CPT | Performed by: STUDENT IN AN ORGANIZED HEALTH CARE EDUCATION/TRAINING PROGRAM

## 2024-12-28 PROCEDURE — 27000221 HC OXYGEN, UP TO 24 HOURS

## 2024-12-28 PROCEDURE — 94644 CONT INHLJ TX 1ST HOUR: CPT

## 2024-12-28 PROCEDURE — 87077 CULTURE AEROBIC IDENTIFY: CPT | Performed by: STUDENT IN AN ORGANIZED HEALTH CARE EDUCATION/TRAINING PROGRAM

## 2024-12-28 PROCEDURE — 99900035 HC TECH TIME PER 15 MIN (STAT)

## 2024-12-28 PROCEDURE — 20000000 HC ICU ROOM

## 2024-12-28 PROCEDURE — 96368 THER/DIAG CONCURRENT INF: CPT

## 2024-12-28 PROCEDURE — 94660 CPAP INITIATION&MGMT: CPT

## 2024-12-28 PROCEDURE — 87389 HIV-1 AG W/HIV-1&-2 AB AG IA: CPT | Performed by: EMERGENCY MEDICINE

## 2024-12-28 PROCEDURE — 90715 TDAP VACCINE 7 YRS/> IM: CPT | Performed by: STUDENT IN AN ORGANIZED HEALTH CARE EDUCATION/TRAINING PROGRAM

## 2024-12-28 PROCEDURE — 87147 CULTURE TYPE IMMUNOLOGIC: CPT | Performed by: STUDENT IN AN ORGANIZED HEALTH CARE EDUCATION/TRAINING PROGRAM

## 2024-12-28 PROCEDURE — 87635 SARS-COV-2 COVID-19 AMP PRB: CPT | Performed by: STUDENT IN AN ORGANIZED HEALTH CARE EDUCATION/TRAINING PROGRAM

## 2024-12-28 PROCEDURE — 93005 ELECTROCARDIOGRAM TRACING: CPT

## 2024-12-28 PROCEDURE — 82803 BLOOD GASES ANY COMBINATION: CPT

## 2024-12-28 PROCEDURE — 83880 ASSAY OF NATRIURETIC PEPTIDE: CPT | Performed by: STUDENT IN AN ORGANIZED HEALTH CARE EDUCATION/TRAINING PROGRAM

## 2024-12-28 PROCEDURE — 25000242 PHARM REV CODE 250 ALT 637 W/ HCPCS: Performed by: STUDENT IN AN ORGANIZED HEALTH CARE EDUCATION/TRAINING PROGRAM

## 2024-12-28 PROCEDURE — 87186 SC STD MICRODIL/AGAR DIL: CPT | Performed by: STUDENT IN AN ORGANIZED HEALTH CARE EDUCATION/TRAINING PROGRAM

## 2024-12-28 PROCEDURE — 85651 RBC SED RATE NONAUTOMATED: CPT | Performed by: STUDENT IN AN ORGANIZED HEALTH CARE EDUCATION/TRAINING PROGRAM

## 2024-12-28 PROCEDURE — 94640 AIRWAY INHALATION TREATMENT: CPT

## 2024-12-28 PROCEDURE — 63600175 PHARM REV CODE 636 W HCPCS: Performed by: STUDENT IN AN ORGANIZED HEALTH CARE EDUCATION/TRAINING PROGRAM

## 2024-12-28 PROCEDURE — 85025 COMPLETE CBC W/AUTO DIFF WBC: CPT | Performed by: STUDENT IN AN ORGANIZED HEALTH CARE EDUCATION/TRAINING PROGRAM

## 2024-12-28 PROCEDURE — 25000003 PHARM REV CODE 250: Performed by: STUDENT IN AN ORGANIZED HEALTH CARE EDUCATION/TRAINING PROGRAM

## 2024-12-28 PROCEDURE — 99291 CRITICAL CARE FIRST HOUR: CPT

## 2024-12-28 PROCEDURE — 94761 N-INVAS EAR/PLS OXIMETRY MLT: CPT | Mod: XB

## 2024-12-28 PROCEDURE — 36415 COLL VENOUS BLD VENIPUNCTURE: CPT | Performed by: EMERGENCY MEDICINE

## 2024-12-28 PROCEDURE — 93010 ELECTROCARDIOGRAM REPORT: CPT | Mod: ,,, | Performed by: INTERNAL MEDICINE

## 2024-12-28 PROCEDURE — 90471 IMMUNIZATION ADMIN: CPT | Performed by: STUDENT IN AN ORGANIZED HEALTH CARE EDUCATION/TRAINING PROGRAM

## 2024-12-28 PROCEDURE — 87502 INFLUENZA DNA AMP PROBE: CPT | Performed by: STUDENT IN AN ORGANIZED HEALTH CARE EDUCATION/TRAINING PROGRAM

## 2024-12-28 PROCEDURE — 86803 HEPATITIS C AB TEST: CPT | Performed by: EMERGENCY MEDICINE

## 2024-12-28 PROCEDURE — 36600 WITHDRAWAL OF ARTERIAL BLOOD: CPT

## 2024-12-28 PROCEDURE — 87070 CULTURE OTHR SPECIMN AEROBIC: CPT | Performed by: STUDENT IN AN ORGANIZED HEALTH CARE EDUCATION/TRAINING PROGRAM

## 2024-12-28 PROCEDURE — 3E0234Z INTRODUCTION OF SERUM, TOXOID AND VACCINE INTO MUSCLE, PERCUTANEOUS APPROACH: ICD-10-PCS | Performed by: HOSPITALIST

## 2024-12-28 PROCEDURE — 86140 C-REACTIVE PROTEIN: CPT | Performed by: STUDENT IN AN ORGANIZED HEALTH CARE EDUCATION/TRAINING PROGRAM

## 2024-12-28 PROCEDURE — 87040 BLOOD CULTURE FOR BACTERIA: CPT | Performed by: STUDENT IN AN ORGANIZED HEALTH CARE EDUCATION/TRAINING PROGRAM

## 2024-12-28 PROCEDURE — 36415 COLL VENOUS BLD VENIPUNCTURE: CPT | Performed by: STUDENT IN AN ORGANIZED HEALTH CARE EDUCATION/TRAINING PROGRAM

## 2024-12-28 PROCEDURE — 27100171 HC OXYGEN HIGH FLOW UP TO 24 HOURS

## 2024-12-28 PROCEDURE — 87634 RSV DNA/RNA AMP PROBE: CPT | Performed by: STUDENT IN AN ORGANIZED HEALTH CARE EDUCATION/TRAINING PROGRAM

## 2024-12-28 PROCEDURE — 96365 THER/PROPH/DIAG IV INF INIT: CPT

## 2024-12-28 RX ORDER — FAMOTIDINE 40 MG/1
40 TABLET, FILM COATED ORAL DAILY PRN
COMMUNITY

## 2024-12-28 RX ORDER — ASPIRIN 325 MG
1 TABLET, DELAYED RELEASE (ENTERIC COATED) ORAL
COMMUNITY

## 2024-12-28 RX ORDER — MUPIROCIN 20 MG/G
OINTMENT TOPICAL 2 TIMES DAILY
Status: DISCONTINUED | OUTPATIENT
Start: 2024-12-29 | End: 2025-01-02 | Stop reason: HOSPADM

## 2024-12-28 RX ORDER — NICARDIPINE HYDROCHLORIDE 0.2 MG/ML
0-15 INJECTION INTRAVENOUS CONTINUOUS
Status: DISCONTINUED | OUTPATIENT
Start: 2024-12-28 | End: 2024-12-29

## 2024-12-28 RX ORDER — FLUTICASONE PROPIONATE 50 MCG
1 SPRAY, SUSPENSION (ML) NASAL DAILY
COMMUNITY

## 2024-12-28 RX ORDER — IPRATROPIUM BROMIDE AND ALBUTEROL SULFATE 2.5; .5 MG/3ML; MG/3ML
3 SOLUTION RESPIRATORY (INHALATION)
Status: COMPLETED | OUTPATIENT
Start: 2024-12-28 | End: 2024-12-28

## 2024-12-28 RX ORDER — LIDOCAINE AND PRILOCAINE 25; 25 MG/G; MG/G
1 CREAM TOPICAL DAILY PRN
COMMUNITY

## 2024-12-28 RX ORDER — DICLOFENAC SODIUM 75 MG/1
75 TABLET, DELAYED RELEASE ORAL
COMMUNITY

## 2024-12-28 RX ORDER — MAGNESIUM SULFATE HEPTAHYDRATE 40 MG/ML
2 INJECTION, SOLUTION INTRAVENOUS
Status: COMPLETED | OUTPATIENT
Start: 2024-12-28 | End: 2024-12-28

## 2024-12-28 RX ORDER — NITROGLYCERIN 0.4 MG/1
0.4 TABLET SUBLINGUAL
Status: COMPLETED | OUTPATIENT
Start: 2024-12-28 | End: 2024-12-28

## 2024-12-28 RX ORDER — HYDROCHLOROTHIAZIDE 25 MG/1
1 TABLET ORAL DAILY
COMMUNITY
Start: 2024-06-05

## 2024-12-28 RX ORDER — BUDESONIDE AND FORMOTEROL FUMARATE DIHYDRATE 160; 4.5 UG/1; UG/1
2 AEROSOL RESPIRATORY (INHALATION) 2 TIMES DAILY
COMMUNITY

## 2024-12-28 RX ORDER — LOSARTAN POTASSIUM 50 MG/1
50 TABLET ORAL DAILY
COMMUNITY

## 2024-12-28 RX ORDER — MONTELUKAST SODIUM 10 MG/1
1 TABLET ORAL DAILY
COMMUNITY

## 2024-12-28 RX ORDER — FUROSEMIDE 10 MG/ML
40 INJECTION INTRAMUSCULAR; INTRAVENOUS
Status: COMPLETED | OUTPATIENT
Start: 2024-12-28 | End: 2024-12-28

## 2024-12-28 RX ORDER — FLUTICASONE FUROATE, UMECLIDINIUM BROMIDE AND VILANTEROL TRIFENATATE 200; 62.5; 25 UG/1; UG/1; UG/1
1 POWDER RESPIRATORY (INHALATION) DAILY
COMMUNITY

## 2024-12-28 RX ADMIN — VANCOMYCIN HYDROCHLORIDE 2000 MG: 500 INJECTION, POWDER, LYOPHILIZED, FOR SOLUTION INTRAVENOUS at 08:12

## 2024-12-28 RX ADMIN — IPRATROPIUM BROMIDE AND ALBUTEROL SULFATE 3 ML: .5; 3 SOLUTION RESPIRATORY (INHALATION) at 09:12

## 2024-12-28 RX ADMIN — PIPERACILLIN SODIUM AND TAZOBACTAM SODIUM 3.38 G: 3; .375 INJECTION, POWDER, LYOPHILIZED, FOR SOLUTION INTRAVENOUS at 07:12

## 2024-12-28 RX ADMIN — NITROGLYCERIN 0.4 MG: 0.4 TABLET SUBLINGUAL at 06:12

## 2024-12-28 RX ADMIN — FUROSEMIDE 40 MG: 10 INJECTION, SOLUTION INTRAMUSCULAR; INTRAVENOUS at 06:12

## 2024-12-28 RX ADMIN — NICARDIPINE HYDROCHLORIDE 5 MG/HR: 0.2 INJECTION, SOLUTION INTRAVENOUS at 07:12

## 2024-12-28 RX ADMIN — CLOSTRIDIUM TETANI TOXOID ANTIGEN (FORMALDEHYDE INACTIVATED), CORYNEBACTERIUM DIPHTHERIAE TOXOID ANTIGEN (FORMALDEHYDE INACTIVATED), BORDETELLA PERTUSSIS TOXOID ANTIGEN (GLUTARALDEHYDE INACTIVATED), BORDETELLA PERTUSSIS FILAMENTOUS HEMAGGLUTININ ANTIGEN (FORMALDEHYDE INACTIVATED), BORDETELLA PERTUSSIS PERTACTIN ANTIGEN, AND BORDETELLA PERTUSSIS FIMBRIAE 2/3 ANTIGEN 0.5 ML: 5; 2; 2.5; 5; 3; 5 INJECTION, SUSPENSION INTRAMUSCULAR at 07:12

## 2024-12-28 RX ADMIN — MAGNESIUM SULFATE HEPTAHYDRATE 2 G: 40 INJECTION, SOLUTION INTRAVENOUS at 09:12

## 2024-12-28 NOTE — Clinical Note
Diagnosis: Acute respiratory failure with hypoxia and hypercapnia [4213649]   Admit to which facility:: [facility] [[id number]]   Reason for IP Medical Treatment  (Clinical interventions that can only be accomplished in the IP setting? ) :: Respiratory failure

## 2024-12-29 ENCOUNTER — CLINICAL SUPPORT (OUTPATIENT)
Dept: CARDIOLOGY | Facility: HOSPITAL | Age: 59
DRG: 579 | End: 2024-12-29
Attending: INTERNAL MEDICINE
Payer: MEDICAID

## 2024-12-29 PROBLEM — J96.22 ACUTE ON CHRONIC RESPIRATORY FAILURE WITH HYPOXIA AND HYPERCAPNIA: Status: ACTIVE | Noted: 2024-12-29

## 2024-12-29 PROBLEM — L08.9 RIGHT FOOT INFECTION: Status: ACTIVE | Noted: 2024-12-29

## 2024-12-29 PROBLEM — L03.90 CELLULITIS: Status: ACTIVE | Noted: 2024-12-29

## 2024-12-29 PROBLEM — S91.341A PUNCTURE WOUND OF RIGHT FOOT WITH FOREIGN BODY: Status: ACTIVE | Noted: 2024-12-29

## 2024-12-29 PROBLEM — J96.21 ACUTE ON CHRONIC RESPIRATORY FAILURE WITH HYPOXIA AND HYPERCAPNIA: Status: ACTIVE | Noted: 2024-12-29

## 2024-12-29 LAB
ALLENS TEST: ABNORMAL
DELSYS: ABNORMAL
EP: 7
ERYTHROCYTE [SEDIMENTATION RATE] IN BLOOD BY WESTERGREN METHOD: 10 MM/H
ESTIMATED AVG GLUCOSE: 131 MG/DL (ref 68–131)
FIO2: 50
GLUCOSE SERPL-MCNC: 95 MG/DL (ref 70–110)
HBA1C MFR BLD: 6.2 % (ref 4.5–6.2)
HCO3 UR-SCNC: 38.4 MMOL/L (ref 24–28)
HCT VFR BLD CALC: 51 %PCV (ref 36–54)
IP: 14
MAGNESIUM SERPL-MCNC: 1.8 MG/DL (ref 1.6–2.6)
MIN VOL: 11.3
MODE: ABNORMAL
PCO2 BLDA: 79.5 MMHG (ref 35–45)
PH SMN: 7.29 [PH] (ref 7.35–7.45)
PHOSPHATE SERPL-MCNC: 3.8 MG/DL (ref 2.7–4.5)
PO2 BLDA: 77 MMHG (ref 80–100)
POC BE: 12 MMOL/L
POC IONIZED CALCIUM: 1.19 MMOL/L (ref 1.06–1.42)
POC SATURATED O2: 93 % (ref 95–100)
POC TCO2: 41 MMOL/L (ref 23–27)
POTASSIUM BLD-SCNC: 4.3 MMOL/L (ref 3.5–5.1)
SAMPLE: ABNORMAL
SITE: ABNORMAL
SODIUM BLD-SCNC: 135 MMOL/L (ref 136–145)
SP02: 94
SPONT RATE: 20

## 2024-12-29 PROCEDURE — C1751 CATH, INF, PER/CENT/MIDLINE: HCPCS

## 2024-12-29 PROCEDURE — 5A09457 ASSISTANCE WITH RESPIRATORY VENTILATION, 24-96 CONSECUTIVE HOURS, CONTINUOUS POSITIVE AIRWAY PRESSURE: ICD-10-PCS | Performed by: HOSPITALIST

## 2024-12-29 PROCEDURE — 82330 ASSAY OF CALCIUM: CPT

## 2024-12-29 PROCEDURE — 27100171 HC OXYGEN HIGH FLOW UP TO 24 HOURS

## 2024-12-29 PROCEDURE — 63600175 PHARM REV CODE 636 W HCPCS

## 2024-12-29 PROCEDURE — 93306 TTE W/DOPPLER COMPLETE: CPT

## 2024-12-29 PROCEDURE — 93306 TTE W/DOPPLER COMPLETE: CPT | Mod: 26,,, | Performed by: INTERNAL MEDICINE

## 2024-12-29 PROCEDURE — 0J9Q3ZZ DRAINAGE OF RIGHT FOOT SUBCUTANEOUS TISSUE AND FASCIA, PERCUTANEOUS APPROACH: ICD-10-PCS | Performed by: HOSPITALIST

## 2024-12-29 PROCEDURE — 87070 CULTURE OTHR SPECIMN AEROBIC: CPT

## 2024-12-29 PROCEDURE — 99222 1ST HOSP IP/OBS MODERATE 55: CPT | Mod: 25,,,

## 2024-12-29 PROCEDURE — 94799 UNLISTED PULMONARY SVC/PX: CPT

## 2024-12-29 PROCEDURE — 99900035 HC TECH TIME PER 15 MIN (STAT)

## 2024-12-29 PROCEDURE — 36600 WITHDRAWAL OF ARTERIAL BLOOD: CPT

## 2024-12-29 PROCEDURE — 63600175 PHARM REV CODE 636 W HCPCS: Performed by: STUDENT IN AN ORGANIZED HEALTH CARE EDUCATION/TRAINING PROGRAM

## 2024-12-29 PROCEDURE — 84295 ASSAY OF SERUM SODIUM: CPT

## 2024-12-29 PROCEDURE — 10061 I&D ABSCESS COMP/MULTIPLE: CPT

## 2024-12-29 PROCEDURE — 94761 N-INVAS EAR/PLS OXIMETRY MLT: CPT

## 2024-12-29 PROCEDURE — 10121 INC&RMVL FB SUBQ TISS COMP: CPT | Mod: ,,,

## 2024-12-29 PROCEDURE — 0JCQ0ZZ EXTIRPATION OF MATTER FROM RIGHT FOOT SUBCUTANEOUS TISSUE AND FASCIA, OPEN APPROACH: ICD-10-PCS | Performed by: HOSPITALIST

## 2024-12-29 PROCEDURE — 5A0935A ASSISTANCE WITH RESPIRATORY VENTILATION, LESS THAN 24 CONSECUTIVE HOURS, HIGH NASAL FLOW/VELOCITY: ICD-10-PCS | Performed by: HOSPITALIST

## 2024-12-29 PROCEDURE — 20000000 HC ICU ROOM

## 2024-12-29 PROCEDURE — 27000207 HC ISOLATION

## 2024-12-29 PROCEDURE — 83036 HEMOGLOBIN GLYCOSYLATED A1C: CPT | Performed by: STUDENT IN AN ORGANIZED HEALTH CARE EDUCATION/TRAINING PROGRAM

## 2024-12-29 PROCEDURE — 94660 CPAP INITIATION&MGMT: CPT

## 2024-12-29 PROCEDURE — 84132 ASSAY OF SERUM POTASSIUM: CPT

## 2024-12-29 PROCEDURE — 99900031 HC PATIENT EDUCATION (STAT)

## 2024-12-29 PROCEDURE — 85014 HEMATOCRIT: CPT

## 2024-12-29 PROCEDURE — 84100 ASSAY OF PHOSPHORUS: CPT | Performed by: STUDENT IN AN ORGANIZED HEALTH CARE EDUCATION/TRAINING PROGRAM

## 2024-12-29 PROCEDURE — 36410 VNPNXR 3YR/> PHY/QHP DX/THER: CPT

## 2024-12-29 PROCEDURE — 82803 BLOOD GASES ANY COMBINATION: CPT

## 2024-12-29 PROCEDURE — 83735 ASSAY OF MAGNESIUM: CPT | Performed by: STUDENT IN AN ORGANIZED HEALTH CARE EDUCATION/TRAINING PROGRAM

## 2024-12-29 PROCEDURE — 25000003 PHARM REV CODE 250: Performed by: STUDENT IN AN ORGANIZED HEALTH CARE EDUCATION/TRAINING PROGRAM

## 2024-12-29 PROCEDURE — 93005 ELECTROCARDIOGRAM TRACING: CPT

## 2024-12-29 PROCEDURE — 93010 ELECTROCARDIOGRAM REPORT: CPT | Mod: ,,, | Performed by: INTERNAL MEDICINE

## 2024-12-29 PROCEDURE — 63600175 PHARM REV CODE 636 W HCPCS: Performed by: INTERNAL MEDICINE

## 2024-12-29 PROCEDURE — 25000242 PHARM REV CODE 250 ALT 637 W/ HCPCS: Performed by: STUDENT IN AN ORGANIZED HEALTH CARE EDUCATION/TRAINING PROGRAM

## 2024-12-29 PROCEDURE — 0JCQ0ZZ EXTIRPATION OF MATTER FROM RIGHT FOOT SUBCUTANEOUS TISSUE AND FASCIA, OPEN APPROACH: ICD-10-PCS

## 2024-12-29 PROCEDURE — 94640 AIRWAY INHALATION TREATMENT: CPT

## 2024-12-29 PROCEDURE — 87075 CULTR BACTERIA EXCEPT BLOOD: CPT

## 2024-12-29 RX ORDER — ONDANSETRON HYDROCHLORIDE 2 MG/ML
4 INJECTION, SOLUTION INTRAVENOUS EVERY 12 HOURS PRN
Status: DISCONTINUED | OUTPATIENT
Start: 2024-12-29 | End: 2025-01-02 | Stop reason: HOSPADM

## 2024-12-29 RX ORDER — LISINOPRIL 20 MG/1
20 TABLET ORAL DAILY
Status: DISCONTINUED | OUTPATIENT
Start: 2024-12-29 | End: 2025-01-02

## 2024-12-29 RX ORDER — ACETAMINOPHEN 325 MG/1
650 TABLET ORAL EVERY 8 HOURS PRN
Status: DISCONTINUED | OUTPATIENT
Start: 2024-12-29 | End: 2024-12-30

## 2024-12-29 RX ORDER — MONTELUKAST SODIUM 10 MG/1
10 TABLET ORAL DAILY
Status: DISCONTINUED | OUTPATIENT
Start: 2024-12-29 | End: 2025-01-02 | Stop reason: HOSPADM

## 2024-12-29 RX ORDER — HYDROCHLOROTHIAZIDE 25 MG/1
25 TABLET ORAL DAILY
Status: DISCONTINUED | OUTPATIENT
Start: 2024-12-29 | End: 2024-12-31

## 2024-12-29 RX ORDER — FUROSEMIDE 10 MG/ML
20 INJECTION INTRAMUSCULAR; INTRAVENOUS DAILY
Status: COMPLETED | OUTPATIENT
Start: 2024-12-29 | End: 2024-12-29

## 2024-12-29 RX ORDER — LIDOCAINE HYDROCHLORIDE 20 MG/ML
INJECTION INTRAVENOUS
Status: COMPLETED
Start: 2024-12-29 | End: 2024-12-29

## 2024-12-29 RX ORDER — ACETAMINOPHEN 325 MG/1
650 TABLET ORAL EVERY 4 HOURS PRN
Status: DISCONTINUED | OUTPATIENT
Start: 2024-12-29 | End: 2024-12-30

## 2024-12-29 RX ORDER — ENOXAPARIN SODIUM 100 MG/ML
40 INJECTION SUBCUTANEOUS EVERY 24 HOURS
Status: DISCONTINUED | OUTPATIENT
Start: 2024-12-29 | End: 2024-12-29

## 2024-12-29 RX ORDER — KETOROLAC TROMETHAMINE 30 MG/ML
15 INJECTION, SOLUTION INTRAMUSCULAR; INTRAVENOUS EVERY 6 HOURS PRN
Status: DISPENSED | OUTPATIENT
Start: 2024-12-29 | End: 2025-01-01

## 2024-12-29 RX ORDER — ARFORMOTEROL TARTRATE 15 UG/2ML
15 SOLUTION RESPIRATORY (INHALATION) 2 TIMES DAILY
Status: DISCONTINUED | OUTPATIENT
Start: 2024-12-29 | End: 2025-01-02 | Stop reason: HOSPADM

## 2024-12-29 RX ORDER — LISINOPRIL AND HYDROCHLOROTHIAZIDE 20; 25 MG/1; MG/1
1 TABLET ORAL DAILY
Status: DISCONTINUED | OUTPATIENT
Start: 2024-12-29 | End: 2024-12-29

## 2024-12-29 RX ORDER — FUROSEMIDE 10 MG/ML
40 INJECTION INTRAMUSCULAR; INTRAVENOUS DAILY
Status: DISCONTINUED | OUTPATIENT
Start: 2024-12-29 | End: 2024-12-30

## 2024-12-29 RX ORDER — IPRATROPIUM BROMIDE AND ALBUTEROL SULFATE 2.5; .5 MG/3ML; MG/3ML
3 SOLUTION RESPIRATORY (INHALATION)
Status: DISCONTINUED | OUTPATIENT
Start: 2024-12-29 | End: 2025-01-02 | Stop reason: HOSPADM

## 2024-12-29 RX ORDER — ENOXAPARIN SODIUM 100 MG/ML
40 INJECTION SUBCUTANEOUS EVERY 12 HOURS
Status: DISCONTINUED | OUTPATIENT
Start: 2024-12-29 | End: 2025-01-02 | Stop reason: HOSPADM

## 2024-12-29 RX ORDER — SODIUM CHLORIDE 0.9 % (FLUSH) 0.9 %
10 SYRINGE (ML) INJECTION
Status: DISCONTINUED | OUTPATIENT
Start: 2024-12-29 | End: 2025-01-02 | Stop reason: HOSPADM

## 2024-12-29 RX ORDER — LIDOCAINE HYDROCHLORIDE 20 MG/ML
5 INJECTION, SOLUTION EPIDURAL; INFILTRATION; INTRACAUDAL; PERINEURAL ONCE
Status: COMPLETED | OUTPATIENT
Start: 2024-12-29 | End: 2024-12-29

## 2024-12-29 RX ORDER — GABAPENTIN 300 MG/1
300 CAPSULE ORAL 2 TIMES DAILY
Status: DISCONTINUED | OUTPATIENT
Start: 2024-12-29 | End: 2025-01-02 | Stop reason: HOSPADM

## 2024-12-29 RX ORDER — METOPROLOL TARTRATE 25 MG/1
25 TABLET, FILM COATED ORAL 2 TIMES DAILY
Status: DISCONTINUED | OUTPATIENT
Start: 2024-12-29 | End: 2025-01-02 | Stop reason: HOSPADM

## 2024-12-29 RX ORDER — TALC
6 POWDER (GRAM) TOPICAL NIGHTLY PRN
Status: DISCONTINUED | OUTPATIENT
Start: 2024-12-29 | End: 2025-01-02 | Stop reason: HOSPADM

## 2024-12-29 RX ORDER — FLUTICASONE FUROATE AND VILANTEROL 200; 25 UG/1; UG/1
1 POWDER RESPIRATORY (INHALATION) DAILY
Status: DISCONTINUED | OUTPATIENT
Start: 2024-12-29 | End: 2024-12-29

## 2024-12-29 RX ORDER — PANTOPRAZOLE SODIUM 40 MG/1
40 TABLET, DELAYED RELEASE ORAL DAILY
Status: DISCONTINUED | OUTPATIENT
Start: 2024-12-29 | End: 2025-01-02 | Stop reason: HOSPADM

## 2024-12-29 RX ORDER — AMOXICILLIN 250 MG
1 CAPSULE ORAL 2 TIMES DAILY PRN
Status: DISCONTINUED | OUTPATIENT
Start: 2024-12-29 | End: 2025-01-02 | Stop reason: HOSPADM

## 2024-12-29 RX ORDER — LOSARTAN POTASSIUM 50 MG/1
50 TABLET ORAL DAILY
Status: DISCONTINUED | OUTPATIENT
Start: 2024-12-29 | End: 2025-01-02 | Stop reason: HOSPADM

## 2024-12-29 RX ORDER — BUPROPION HYDROCHLORIDE 150 MG/1
150 TABLET ORAL DAILY
Status: DISCONTINUED | OUTPATIENT
Start: 2024-12-29 | End: 2025-01-02 | Stop reason: HOSPADM

## 2024-12-29 RX ORDER — BUDESONIDE 0.5 MG/2ML
1 INHALANT ORAL EVERY 12 HOURS
Status: DISCONTINUED | OUTPATIENT
Start: 2024-12-29 | End: 2025-01-02 | Stop reason: HOSPADM

## 2024-12-29 RX ADMIN — IPRATROPIUM BROMIDE AND ALBUTEROL SULFATE 3 ML: .5; 3 SOLUTION RESPIRATORY (INHALATION) at 08:12

## 2024-12-29 RX ADMIN — ACETAMINOPHEN 650 MG: 325 TABLET ORAL at 03:12

## 2024-12-29 RX ADMIN — FUROSEMIDE 20 MG: 10 INJECTION, SOLUTION INTRAMUSCULAR; INTRAVENOUS at 09:12

## 2024-12-29 RX ADMIN — HYDROCHLOROTHIAZIDE 25 MG: 25 TABLET ORAL at 09:12

## 2024-12-29 RX ADMIN — BUDESONIDE INHALATION 1 MG: 0.5 SUSPENSION RESPIRATORY (INHALATION) at 08:12

## 2024-12-29 RX ADMIN — PANTOPRAZOLE SODIUM 40 MG: 40 TABLET, DELAYED RELEASE ORAL at 05:12

## 2024-12-29 RX ADMIN — BUDESONIDE INHALATION 1 MG: 0.5 SUSPENSION RESPIRATORY (INHALATION) at 07:12

## 2024-12-29 RX ADMIN — LISINOPRIL 20 MG: 20 TABLET ORAL at 09:12

## 2024-12-29 RX ADMIN — ARFORMOTEROL TARTRATE 15 MCG: 15 SOLUTION RESPIRATORY (INHALATION) at 07:12

## 2024-12-29 RX ADMIN — LOSARTAN POTASSIUM 50 MG: 50 TABLET, FILM COATED ORAL at 09:12

## 2024-12-29 RX ADMIN — MONTELUKAST 10 MG: 10 TABLET, FILM COATED ORAL at 09:12

## 2024-12-29 RX ADMIN — GABAPENTIN 300 MG: 300 CAPSULE ORAL at 09:12

## 2024-12-29 RX ADMIN — LIDOCAINE HYDROCHLORIDE 100 MG: 20 INJECTION, SOLUTION EPIDURAL; INFILTRATION; INTRACAUDAL; PERINEURAL at 01:12

## 2024-12-29 RX ADMIN — IPRATROPIUM BROMIDE AND ALBUTEROL SULFATE 3 ML: .5; 3 SOLUTION RESPIRATORY (INHALATION) at 07:12

## 2024-12-29 RX ADMIN — KETOROLAC TROMETHAMINE 15 MG: 30 INJECTION, SOLUTION INTRAMUSCULAR; INTRAVENOUS at 03:12

## 2024-12-29 RX ADMIN — FUROSEMIDE 40 MG: 10 INJECTION, SOLUTION INTRAMUSCULAR; INTRAVENOUS at 02:12

## 2024-12-29 RX ADMIN — ENOXAPARIN SODIUM 40 MG: 40 INJECTION SUBCUTANEOUS at 09:12

## 2024-12-29 RX ADMIN — METOPROLOL TARTRATE 25 MG: 25 TABLET, FILM COATED ORAL at 09:12

## 2024-12-29 RX ADMIN — MUPIROCIN 1 G: 20 OINTMENT TOPICAL at 09:12

## 2024-12-29 RX ADMIN — ARFORMOTEROL TARTRATE 15 MCG: 15 SOLUTION RESPIRATORY (INHALATION) at 08:12

## 2024-12-29 RX ADMIN — VANCOMYCIN HYDROCHLORIDE 2000 MG: 500 INJECTION, POWDER, LYOPHILIZED, FOR SOLUTION INTRAVENOUS at 09:12

## 2024-12-29 RX ADMIN — PIPERACILLIN SODIUM AND TAZOBACTAM SODIUM 3.38 G: 3; .375 INJECTION, POWDER, FOR SOLUTION INTRAVENOUS at 06:12

## 2024-12-29 RX ADMIN — PIPERACILLIN SODIUM AND TAZOBACTAM SODIUM 3.38 G: 3; .375 INJECTION, POWDER, FOR SOLUTION INTRAVENOUS at 09:12

## 2024-12-29 RX ADMIN — BUPROPION HYDROCHLORIDE 150 MG: 150 TABLET, EXTENDED RELEASE ORAL at 09:12

## 2024-12-29 NOTE — ASSESSMENT & PLAN NOTE
Patient has cellulitis of right foot with retained foreign body after stepping on nail.    Continue antibiotics - vancomycin, zosyn  Podiatry evaluation   Pain control PRN

## 2024-12-29 NOTE — PROGRESS NOTES
Pharmacist Renal Adjustment Note    Renard Jimenez is a 59 y.o. male being treated with Enoxaparin.     Patient Data:    Vital Signs (Most Recent):  Temp: 97.9 °F (36.6 °C) (12/28/24 2310)  Pulse: 79 (12/29/24 0000)  Resp: 20 (12/29/24 0000)  BP: 124/60 (12/29/24 0000)  SpO2: (!) 93 % (12/29/24 0000) Vital Signs (72h Range):  Temp:  [97.2 °F (36.2 °C)-97.9 °F (36.6 °C)]   Pulse:  [70-85]   Resp:  [20-27]   BP: (120-194)/()   SpO2:  [86 %-97 %]      Recent Labs   Lab 12/28/24  1805   CREATININE 0.8     Serum creatinine: 0.8 mg/dL 12/28/24 1805  Estimated creatinine clearance: 138.2 mL/min  Body mass index is 45.21 kg/m².    Enoxaparin 40 mg every 24 hours will be changed to Enoxaparin 40 mg every 12 hours due to CrCl > 30 and BMI > 40 per Renal Dose Adjustment protocol.     Pharmacist's Name: Shasta Scales  Pharmacist's Extension: [phone]

## 2024-12-29 NOTE — SUBJECTIVE & OBJECTIVE
Past Medical History:   Diagnosis Date    CHF (congestive heart failure)     COPD (chronic obstructive pulmonary disease)     HLD (hyperlipidemia)     Hypertension     MI, old        Past Surgical History:   Procedure Laterality Date    CAROTID ENDARTERECTOMY Left 11/17/2022    Procedure: ENDARTERECTOMY-CAROTID;  Surgeon: ELIDA Guadalupe II, MD;  Location: [facility] OR [address] [place] [address];  Service: Vascular;  Laterality: Left;    LAPAROSCOPIC CHOLECYSTECTOMY N/A 4/11/2022    Procedure: CHOLECYSTECTOMY, LAPAROSCOPIC;  Surgeon: Neville Hutchinson MD;  Location: [facility];  Service: General;  Laterality: N/A;    TONSILLECTOMY         Review of patient's allergies indicates:   Allergen Reactions    Dog dander Hives    House dust Hives    Maple flavor Other (See Comments)     Throat swelling      Pollen extracts Hives       Current Facility-Administered Medications on File Prior to Encounter   Medication    diphenhydrAMINE injection 25 mg    HYDROmorphone injection 0.2 mg    prochlorperazine injection Soln 5 mg    sodium chloride 0.9% flush 10 mL     Current Outpatient Medications on File Prior to Encounter   Medication Sig    diclofenac (VOLTAREN) 75 MG EC tablet Take 75 mg by mouth.    famotidine (PEPCID) 40 MG tablet Take 40 mg by mouth daily as needed for Heartburn.    hydroCHLOROthiazide (HYDRODIURIL) 25 MG tablet Take 1 tablet by mouth once daily.    acetaminophen (TYLENOL) 500 MG tablet Take 2 tablets (1,000 mg total) by mouth every 6 (six) hours as needed for Pain.    albuterol (PROVENTIL) 2.5 mg /3 mL (0.083 %) nebulizer solution Take 2.5 mg by nebulization every 4 (four) hours as needed.    albuterol (PROVENTIL/VENTOLIN HFA) 90 mcg/actuation inhaler Inhale 2 puffs into the lungs every 6 (six) hours as needed.    aspirin 81 MG Chew Take 1 tablet (81 mg total) by mouth once daily.    atorvastatin (LIPITOR) 80 MG tablet Take 1 tablet (80 mg total) by mouth once daily.    budesonide-formoterol 160-4.5 mcg (SYMBICORT) 160-4.5  mcg/actuation HFAA Inhale 2 puffs into the lungs 2 (two) times daily.    buPROPion (WELLBUTRIN XL) 150 MG TB24 tablet Take 150 mg by mouth once daily.    cholecalciferol, vitamin D3, 1,250 mcg (50,000 unit) capsule Take 1 capsule by mouth every 7 days.    clopidogreL (PLAVIX) 75 mg tablet Take 1 tablet (75 mg total) by mouth once daily. (Patient taking differently: Take 75 mg by mouth every evening.)    fluticasone furoate-vilanteroL (BREO) 200-25 mcg/dose DsDv diskus inhaler Breo Ellipta 200 mcg-25 mcg/dose powder for inhalation   Inhale by inhalation route for 30 days.    fluticasone propionate (FLONASE) 50 mcg/actuation nasal spray 1 spray by Each Nostril route once daily.    gabapentin (NEURONTIN) 300 MG capsule Take 300 mg by mouth 2 (two) times daily.    LIDOcaine-prilocaine (EMLA) cream Apply 1 g topically daily as needed.    lisinopriL-hydrochlorothiazide (PRINZIDE,ZESTORETIC) 20-25 mg Tab lisinopril 20 mg-hydrochlorothiazide 25 mg tablet   TAKE ONE TABLET BY MOUTH EVERY DAY    losartan (COZAAR) 50 MG tablet Take 50 mg by mouth once daily.    metoprolol tartrate (LOPRESSOR) 25 MG tablet Take 1 tablet (25 mg total) by mouth 2 (two) times daily.    montelukast (SINGULAIR) 10 mg tablet Take 1 tablet by mouth once daily.    mupirocin (BACTROBAN) 2 % ointment Apply topically 3 (three) times daily.    pantoprazole (PROTONIX) 40 MG tablet Take 40 mg by mouth once daily.    TRELEGY ELLIPTA 200-62.5-25 mcg inhaler Inhale 1 puff into the lungs once daily.    valACYclovir (VALTREX) 1000 MG tablet Take 1 tablet (1,000 mg total) by mouth every 12 (twelve) hours. for 7 days     Family History       Problem Relation (Age of Onset)    Hypertension Father          Tobacco Use    Smoking status: Every Day     Current packs/day: 1.00     Average packs/day: 1 pack/day for 30.0 years (30.0 ttl pk-yrs)     Types: Cigarettes    Smokeless tobacco: Not on file   Substance and Sexual Activity    Alcohol use: Not Currently    Drug  use: Yes     Types: Marijuana     Comment: 3 to 4 times a week    Sexual activity: Yes     Partners: Female     Review of Systems   Constitutional:  Negative for chills and fever.   HENT:  Negative for ear pain, rhinorrhea, sinus pain and sore throat.    Eyes:  Negative for visual disturbance.   Respiratory:  Positive for shortness of breath. Negative for cough and wheezing.    Cardiovascular:  Positive for leg swelling. Negative for chest pain and palpitations.   Gastrointestinal:  Negative for abdominal pain, constipation, diarrhea, nausea and vomiting.   Endocrine: Negative for polyuria.   Genitourinary:  Negative for dysuria and hematuria.   Musculoskeletal:  Negative for back pain.   Skin:  Positive for color change and wound. Negative for rash.   Neurological:  Negative for syncope, weakness, numbness and headaches.   Psychiatric/Behavioral:  Negative for behavioral problems.      Objective:     Vital Signs (Most Recent):  Temp: 97.9 °F (36.6 °C) (12/28/24 2310)  Pulse: 88 (12/29/24 0154)  Resp: (!) 22 (12/29/24 0154)  BP: (!) 144/74 (12/29/24 0100)  SpO2: (!) 93 % (12/29/24 0154) Vital Signs (24h Range):  Temp:  [97.2 °F (36.2 °C)-97.9 °F (36.6 °C)] 97.9 °F (36.6 °C)  Pulse:  [70-88] 88  Resp:  [20-27] 22  SpO2:  [86 %-97 %] 93 %  BP: (115-194)/() 144/74     Weight: (!) 140.9 kg (310 lb 10.1 oz)  Body mass index is 45.21 kg/m².     Physical Exam  Constitutional:       General: He is not in acute distress.     Appearance: Normal appearance.      Comments: On bipap   HENT:      Head: Normocephalic and atraumatic.      Nose: No rhinorrhea.   Eyes:      General: No scleral icterus.     Extraocular Movements: Extraocular movements intact.      Pupils: Pupils are equal, round, and reactive to light.   Cardiovascular:      Rate and Rhythm: Normal rate and regular rhythm.      Pulses: Normal pulses.      Heart sounds: Normal heart sounds. No murmur heard.  Pulmonary:      Effort: Pulmonary effort is normal. No  "respiratory distress.      Breath sounds: Normal breath sounds. No wheezing or rales.   Abdominal:      General: There is no distension.      Palpations: Abdomen is soft.   Musculoskeletal:      Right lower leg: Edema present.      Left lower leg: Edema present.      Comments: Trace/mild edema in bilateral lower extremities   Skin:     General: Skin is warm.      Coloration: Skin is not jaundiced.      Findings: Erythema present. No rash.      Comments: Patient has point lesion on ventral  right mid foot with clear drainage, anterior foot is erythematous.  Erythema extends to anterior leg    Neurological:      General: No focal deficit present.      Mental Status: He is alert. Mental status is at baseline.   Psychiatric:         Mood and Affect: Mood normal.              CRANIAL NERVES     CN III, IV, VI   Pupils are equal, round, and reactive to light.       Significant Labs: All pertinent labs within the past 24 hours have been reviewed.  ABGs:   Recent Labs   Lab 12/28/24  1928 12/28/24 2053 12/28/24  2108   PH 7.303* 7.263* 7.317*   PCO2 68.1* 77.4* 70.9*   HCO3 33.7* 34.9* 36.3*   POCSATURATED 91 73 92   BE 7* 8* 10*   PO2 69* 46 72*     Blood Culture: No results for input(s): "LABBLOO" in the last 48 hours.  BMP:   Recent Labs   Lab 12/28/24  1805      *   K 5.1   CL 93*   CO2 32*   BUN 12   CREATININE 0.8   CALCIUM 9.1     CBC:   Recent Labs   Lab 12/28/24  1805   WBC 8.87   HGB 15.9   HCT 51.2        CMP:   Recent Labs   Lab 12/28/24  1805   *   K 5.1   CL 93*   CO2 32*      BUN 12   CREATININE 0.8   CALCIUM 9.1   PROT 7.3   ALBUMIN 3.6   BILITOT 1.4*   ALKPHOS 88   AST 26   ALT 40   ANIONGAP 7*     Cardiac Markers:   Recent Labs   Lab 12/28/24  1805   *     Lipase: No results for input(s): "LIPASE" in the last 48 hours.  Lipid Panel: No results for input(s): "CHOL", "HDL", "LDLCALC", "TRIG", "CHOLHDL" in the last 48 hours.  Troponin:   Recent Labs   Lab " "12/28/24  1805 12/28/24  2122   TROPONINI <0.006 0.012     Urine Culture: No results for input(s): "LABURIN" in the last 48 hours.  Urine Studies: No results for input(s): "COLORU", "APPEARANCEUA", "PHUR", "SPECGRAV", "PROTEINUA", "GLUCUA", "KETONESU", "BILIRUBINUA", "OCCULTUA", "NITRITE", "UROBILINOGEN", "LEUKOCYTESUR", "RBCUA", "WBCUA", "BACTERIA", "SQUAMEPITHEL", "HYALINECASTS" in the last 48 hours.    Invalid input(s): "WRIGHTSUR"    Significant Imaging: I have reviewed all pertinent imaging results/findings within the past 24 hours.  "

## 2024-12-29 NOTE — PLAN OF CARE
Problem: Skin Injury Risk Increased  Goal: Skin Health and Integrity  Outcome: Progressing     Problem: Adult Inpatient Plan of Care  Goal: Plan of Care Review  Outcome: Progressing  Goal: Patient-Specific Goal (Individualized)  Outcome: Progressing  Goal: Absence of Hospital-Acquired Illness or Injury  Outcome: Progressing  Goal: Optimal Comfort and Wellbeing  Outcome: Progressing  Goal: Readiness for Transition of Care  Outcome: Progressing     Problem: Bariatric Environmental Safety  Goal: Safety Maintained with Care  Outcome: Progressing     Problem: COPD (Chronic Obstructive Pulmonary Disease)  Goal: Optimal Chronic Illness Coping  Outcome: Progressing  Goal: Optimal Level of Functional [place]  Outcome: Progressing  Goal: Absence of Infection Signs and Symptoms  Outcome: Progressing  Goal: Improved Oral Intake  Outcome: Progressing  Goal: Effective Oxygenation and Ventilation  Outcome: Progressing     Problem: Wound  Goal: Optimal Coping  Outcome: Progressing  Goal: Optimal Functional Ability  Outcome: Progressing  Goal: Absence of Infection Signs and Symptoms  Outcome: Progressing  Goal: Improved Oral Intake  Outcome: Progressing  Goal: Optimal Pain Control and Function  Outcome: Progressing  Goal: Skin Health and Integrity  Outcome: Progressing  Goal: Optimal Wound Healing  Outcome: Progressing     Problem: Infection  Goal: Absence of Infection Signs and Symptoms  Outcome: Progressing

## 2024-12-29 NOTE — ASSESSMENT & PLAN NOTE
Patient has cellulitis of right foot with retained foreign body after stepping on nail.    Continue antibiotics - vancomycin, zosyn  Podiatry evaluation   Pain control PRN   Patient can eat

## 2024-12-29 NOTE — HOSPITAL COURSE
59 year old male with comorbid conditions of COPD, chronic O2 supplementation, HFpEF, HLD, hypertension, and h/o MI presented to [facility] due to shortness of breath.  Per EMS, he had been  saturating 64% on 1.5lpm nasal cannula.  He was placed on additional oxygen and brought to ED.  Per wife patient had also been complaining of right foot pain after stepping on a nail 1 week prior.  Denied chest pain, fevers, nausea/vomiting, diarrhea.     On evaluation patient had crackles bilaterally, edema in extremities. BNP was 676. CXR revealed pulmonary vascular congestion.  Was given 40mg IV lasix with over 2 liter output.  He was given three more doses of furosemide. Right foot noted to be erythematous with purulent drainage.  US extremity did not reveal fluid collection.  X-ray foot revealed 1.0cm metallic foreign body within plantar soft tissue.  Started on vancomycin and zosyn.  Podiatry consulted with us and performed bedside removal of the foreign body under moderate sedation (appeared to be tip of needle).  Patient was started on nicardipine infusion to control high BP.  He was weaned off of that when the BP improved.  Blood gas revealed hypercapnia for which patient was started on BiPAP.  Also given Solu Medrol and magnesium. Patient was transferred to [facility] for continuous bipap requirement.  We were able to wean him from BiPAP.  ABG's began to show an appropriate rise in serum bicarb to help normalize the pH.  It was apparent that patient had chronic hypercapnia.  The foot grew MRSA.  He was continued on vancomycin.  We worked on lowering his oxygen requirement from 4 LPM to 2, and our goal saturation was between 88 and 92.  Physical therapy recommended low-intensity therapy after discharge.  We discharged the patient home with prescription for clindamycin.  We instructed him to follow up with the podiatrist for removal of sutures.    Physical Exam  Vitals reviewed.   Constitutional:       General: He is not in acute  distress.     Appearance: He is not diaphoretic.      Interventions: Nasal cannula in place.   Neck:      Vascular: No JVD.   Cardiovascular:      Rate and Rhythm: Normal rate and regular rhythm.   Pulmonary:      Effort: Pulmonary effort is normal.      Breath sounds: Normal breath sounds. Decreased air movement present.

## 2024-12-29 NOTE — ASSESSMENT & PLAN NOTE
Patient's blood pressure range in the last 24 hours was: BP  Min: 115/59  Max: 194/105.The patient's inpatient anti-hypertensive regimen is listed below:  Current Antihypertensives  , Daily, Oral  , Daily, Oral  losartan tablet 50 mg, Daily, Oral  metoprolol tartrate (LOPRESSOR) tablet 25 mg, 2 times daily, Oral  lisinopriL tablet 20 mg, Daily, Oral  hydroCHLOROthiazide tablet 25 mg, Daily, Oral  furosemide injection 40 mg, Daily, Intravenous    Plan  - BP is controlled, no changes needed to their regimen

## 2024-12-29 NOTE — ED PROVIDER NOTES
Encounter Date: 12/28/2024       History     Chief Complaint   Patient presents with    Shortness of Breath     Sob for the past few days. Pt is hallucinating rainbows. Pt is always on 1.5L of oxygen. Pt was 64% on 1.5L on EMS arrival. On nonrebreather now.      59-year-old male presents for evaluation of shortness a breath.  Wears 1-1/2 L oxygen at baseline, history of heart failure and COPD.  EMS called out today for dyspnea and oxygen 64% on 1-1/2 L. placed on additional oxygen support and brought to ED. collateral history obtained from wife reports patient has been complaining about right foot pain after stepping on a nail within the last week.  Patient denies any chest pain.    The history is provided by the patient and the spouse.     Review of patient's allergies indicates:   Allergen Reactions    Dog dander Hives    House dust Hives    Maple flavor Other (See Comments)     Throat swelling      Pollen extracts Hives     Past Medical History:   Diagnosis Date    CHF (congestive heart failure)     COPD (chronic obstructive pulmonary disease)     HLD (hyperlipidemia)     Hypertension     MI, old      Past Surgical History:   Procedure Laterality Date    CAROTID ENDARTERECTOMY Left 11/17/2022    Procedure: ENDARTERECTOMY-CAROTID;  Surgeon: ELIDA Guadalupe II, MD;  Location: [address];  Service: Vascular;  Laterality: Left;    LAPAROSCOPIC CHOLECYSTECTOMY N/A 4/11/2022    Procedure: CHOLECYSTECTOMY, LAPAROSCOPIC;  Surgeon: Neville Hutchinson MD;  Location: [facility];  Service: General;  Laterality: N/A;    TONSILLECTOMY       Family History   Problem Relation Name Age of Onset    Hypertension Father       Social History     Tobacco Use    Smoking status: Every Day     Current packs/day: 1.00     Average packs/day: 1 pack/day for 30.0 years (30.0 ttl pk-yrs)     Types: Cigarettes   Substance Use Topics    Alcohol use: Not Currently    Drug use: Yes     Types: Marijuana     Comment: 3 to 4 times a week      Review of Systems   All other systems reviewed and are negative.      Physical Exam     Initial Vitals [12/28/24 1744]   BP Pulse Resp Temp SpO2   (!) 164/81 85 (!) 24 97.2 °F (36.2 °C) (!) 86 %      MAP       --         Physical Exam    Nursing note and vitals reviewed.  Constitutional: Vital signs are normal.  Non-toxic appearance. He appears distressed.   HENT:   Head: Normocephalic.   Eyes: No scleral icterus.   Cardiovascular:  Normal rate.           Pulmonary/Chest: No stridor. He is in respiratory distress.   Crackles bilaterally, Bilateral chest rise   Abdominal: There is no guarding.   Musculoskeletal:         General: Edema present. No tenderness.      Cervical back: No rigidity.     Neurological: He is alert.   Skin: Skin is warm and dry. No rash noted. There is erythema.   Right foot erythema and purulence   Psychiatric: His speech is normal. He is not actively hallucinating.   Not anxious  or agitated               ED Course   Critical Care    Date/Time: 12/28/2024 5:39 PM    Performed by: Manjinder Malagon Jr., DO  Authorized by: Manjinder Malagon Jr., DO  Direct patient critical care time: 40 minutes  Additional history critical care time: 10 minutes  Ordering / reviewing critical care time: 10 minutes  Documentation critical care time: 10 minutes  Consulting other physicians critical care time: 15 minutes  Total critical care time (exclusive of procedural time) : 85 minutes        Labs Reviewed   CBC W/ AUTO DIFFERENTIAL - Abnormal       Result Value    WBC 8.87      RBC 4.91      Hemoglobin 15.9      Hematocrit 51.2       (*)     MCH 32.4 (*)     MCHC 31.1 (*)     RDW 14.5      Platelets 213      MPV 10.1      Immature Granulocytes 0.2      Gran # (ANC) 7.1      Immature Grans (Abs) 0.02      Lymph # 1.1      Mono # 0.7      Eos # 0.0      Baso # 0.03      nRBC 0      Gran % 79.7 (*)     Lymph % 12.4 (*)     Mono % 7.4      Eosinophil % 0.0      Basophil % 0.3      Differential Method  Automated      Narrative:     Release to patient->Immediate   COMPREHENSIVE METABOLIC PANEL - Abnormal    Sodium 132 (*)     Potassium 5.1      Chloride 93 (*)     CO2 32 (*)     Glucose 105      BUN 12      Creatinine 0.8      Calcium 9.1      Total Protein 7.3      Albumin 3.6      Total Bilirubin 1.4 (*)     Alkaline Phosphatase 88      AST 26      ALT 40      eGFR >60      Anion Gap 7 (*)     Narrative:     Release to patient->Immediate   B-TYPE NATRIURETIC PEPTIDE - Abnormal     (*)     Narrative:     Release to patient->Immediate   C-REACTIVE PROTEIN - Abnormal    CRP 41.5 (*)    ISTAT PROCEDURE - Abnormal    POC PH 7.303 (*)     POC PCO2 68.1 (*)     POC PO2 69 (*)     POC HCO3 33.7 (*)     POC BE 7 (*)     POC SATURATED O2 91      POC TCO2 36 (*)     Sample ARTERIAL      Site RR      Allens Test Pass      DelSys Nasal Can      Mode SPONT      Flow 4      Sp02 90     ISTAT PROCEDURE - Abnormal    POC PH 7.263 (*)     POC PCO2 77.4 (*)     POC PO2 46      POC HCO3 34.9 (*)     POC BE 8 (*)     POC SATURATED O2 73      POC TCO2 37 (*)     Sample VENOUS      Site Other      Allens Test N/A      DelSys CPAP/BiPAP      Mode BiPAP      FiO2 50      Spont Rate 25      Sp02 96      IP 14      EP 7     ISTAT PROCEDURE - Abnormal    POC PH 7.317 (*)     POC PCO2 70.9 (*)     POC PO2 72 (*)     POC HCO3 36.3 (*)     POC BE 10 (*)     POC SATURATED O2 92      POC TCO2 38 (*)     Sample ARTERIAL      Site RR      Allens Test Pass      DelSys CPAP/BiPAP      Mode BiPAP      FiO2 50      Spont Rate 24      Sp02 95      IP 14      EP 7     INFLUENZA A & B BY MOLECULAR    Influenza A, Molecular Negative      Influenza B, Molecular Negative      Flu A & B Source Nasal swab     CULTURE, BLOOD   CULTURE, BLOOD   CULTURE, AEROBIC  (SPECIFY SOURCE)   HEPATITIS C ANTIBODY    Hepatitis C Ab Negative      Narrative:     Release to patient->Immediate   HIV 1 / 2 ANTIBODY    HIV 1/2 Ag/Ab Negative      Narrative:     Release to  patient->Immediate   TROPONIN I    Troponin I <0.006      Narrative:     Release to patient->Immediate   SARS-COV-2 RNA AMPLIFICATION, QUAL    SARS-CoV-2 RNA, Amplification, Qual Negative     RSV ANTIGEN DETECTION    RSV Source Nasopharyngeal Swab      RSV Ag by Molecular Method Negative     SEDIMENTATION RATE    Sed Rate 2     TROPONIN I          Imaging Results              US Soft Tissue, Lower Extremity, Right (Final result)  Result time 12/28/24 19:19:53      Final result by Kevin Johnson DO (12/28/24 19:19:53)                   Impression:      No fluid collection.      Electronically signed by: Kevin Johnson  Date:    12/28/2024  Time:    19:19               Narrative:    EXAMINATION:  US SOFT TISSUE, LOWER EXTREMITY, RIGHT    CLINICAL HISTORY:  foot infection;    TECHNIQUE:  Focused ultrasound of the right foot soft tissues was performed in the area of palpable abnormality at the head of the 4th metatarsal.    COMPARISON:  None    FINDINGS:  There is mild edema within the soft tissues.  There is no focal fluid collection or other sonographic abnormality.                                       X-Ray Foot Complete Right (Final result)  Result time 12/28/24 19:04:36      Final result by Kevin Johnson DO (12/28/24 19:04:36)                   Impression:      1.0 cm linear metallic foreign body within the plantar soft tissues.      Electronically signed by: Kevin Johnson  Date:    12/28/2024  Time:    19:04               Narrative:    EXAMINATION:  XR FOOT COMPLETE 3 VIEW RIGHT    CLINICAL HISTORY:  . Pain in right foot    TECHNIQUE:  AP, lateral, and oblique views of the right foot were performed.    COMPARISON:  None    FINDINGS:  There is a 1.0 cm linear metallic foreign body within the plantar soft tissues between the 3rd and 4th metatarsal heads.  Several additional smaller densities are also noted in the soft tissues, which may represent dystrophic calcifications or additional foreign bodies.   There is no acute fracture or dislocation.  Alignment is normal.  Joint spaces are preserved.                                       X-Ray Chest 1 View (Final result)  Result time 12/28/24 18:35:57      Final result by Kevin Johnson DO (12/28/24 18:35:57)                   Impression:      Cardiomegaly with pulmonary vascular congestion and mild perihilar edema.      Electronically signed by: Kevin Johnson  Date:    12/28/2024  Time:    18:35               Narrative:    EXAMINATION:  XR CHEST 1 VIEW    CLINICAL HISTORY:  Dyspnea, unspecified    TECHNIQUE:  Single frontal view of the chest was performed.    COMPARISON:  02/03/2023.    FINDINGS:  The lungs are well expanded.  There is pulmonary vascular digestion and there is perihilar interstitial edema.  There is no new focal consolidation.  The pleural spaces are clear.  The cardiac silhouette is enlarged.  There are calcifications of the aortic arch.  Osseous structures are intact                                       Medications   vancomycin (VANCOCIN) 2,000 mg in 0.9% NaCl 500 mL IVPB (2,000 mg Intravenous New Bag 12/28/24 2024)   niCARdipine 40 mg/200 mL (0.2 mg/mL) infusion (5 mg/hr Intravenous Rate/Dose Change 12/28/24 2116)   magnesium sulfate 2g in water 50mL IVPB (premix) (has no administration in time range)   nitroGLYCERIN SL tablet 0.4 mg (0.4 mg Sublingual Given 12/28/24 1826)   furosemide injection 40 mg (40 mg Intravenous Given 12/28/24 1800)   piperacillin-tazobactam (ZOSYN) 3.375 g in D5W 100 mL IVPB (MB+) (0 g Intravenous Stopped 12/28/24 2009)   Tdap vaccine injection 0.5 mL (0.5 mLs Intramuscular Given 12/28/24 1939)   albuterol-ipratropium 2.5 mg-0.5 mg/3 mL nebulizer solution 3 mL (3 mLs Nebulization Given 12/28/24 2113)     Medical Decision Making  59-year-old male presents with respiratory failure.  Patient hypertensive and administered oral and topical nitroglycerin with no significant improvement in blood pressure management and  eventually started on Cardene infusion and titrating.  Patient was on nasal cannula here in emergency department however advanced to noninvasive breathing support given mixed hypercapnic and hypoxemic respiratory failure.  Patient administered IV Lasix and urinated 2.3 L of urine already.  Right foot infection with purulent drainage and foreign body.  Notified podiatrist on-call .  Ultrasound obtained with no evidence of abscess, unable to get MRI due to foreign body of foot, we will plan to get CT imaging however at this time stabilizing patient's respiratory status.    -ABG with slightly worsened hypercapnia.  Patient will require ICU level care, spoke with hospitalist  who will admit for further management and evaluation, we will administer DuoNeb treatments, IV Solu-Medrol and magnesium.  Patient updated and agrees with plan    Amount and/or Complexity of Data Reviewed  Labs: ordered. Decision-making details documented in ED Course.  Radiology: ordered and independent interpretation performed.     Details: Concern for pulmonary edema  ECG/medicine tests: ordered and independent interpretation performed.     Details: EKG rate 85, , , normal sinus rhythm, no STEMI  Discussion of management or test interpretation with external provider(s): Spoke with hospitalist ,  will admit for further management evaluation  Podiatrist regarding right foot foreign body    Risk  Prescription drug management.  Decision regarding hospitalization.               ED Course as of 12/28/24 2117   Sat Dec 28, 2024   1820 WBC: 8.87 [KB]   1820 Hemoglobin: 15.9 [KB]   1820 Hematocrit: 51.2 [KB]   1843 Sodium(!): 132 [KB]   1843 Potassium: 5.1 [KB]   1843 Chloride(!): 93 [KB]   1843 CO2(!): 32 [KB]   1843 Glucose: 105 [KB]   1843 BUN: 12 [KB]   1843 Creatinine: 0.8 [KB]   1843 Calcium: 9.1 [KB]   1843 PROTEIN TOTAL: 7.3 [KB]   1843 Albumin: 3.6 [KB]   1843 BILIRUBIN TOTAL(!): 1.4 [KB]   1843 ALP: 88  [KB]   1843 AST: 26 [KB]   1843 ALT: 40 [KB]   1843 eGFR: >60 [KB]   1843 BNP(!): 676 [KB]   1844 Troponin I: <0.006 [KB]   1844 Chest x-ray concerning for pulmonary edema per my interpretation [KB]   1902 Right foot concern for foreign body per my interpretation Manjinder Malagon DO [KB]   1917 CRP(!): 41.5 [KB]   1934 POC PH(!): 7.303 [KB]   1934 POC PCO2(!!): 68.1 [KB]   2004 Patient urinated nearly 2 L of urine, blood pressure responsive to Cardene infusion and on noninvasive breathing support for mixed hypoxemic and hypercapnic respiratory failure [KB]      ED Course User Index  [KB] Manjinder Malagon Jr., DO                           Clinical Impression:  Final diagnoses:  [R06.00] Dyspnea  [M79.671] Foot pain, right  [L08.9] Right foot infection  [T14.8XXA] Puncture wound  [I16.1] Hypertensive emergency  [J96.01, J96.02] Acute respiratory failure with hypoxia and hypercapnia (Primary)          ED Disposition Condition    Admit                 Manjinder Malagon Jr., DO  12/28/24 5869

## 2024-12-29 NOTE — HPI
59-year-old male presents for evaluation of shortness a breath. Wears 1-1/2 L oxygen at baseline, history of heart failure and COPD. EMS called out yesterday for dyspnea and oxygen 64% on 1-1/2 L. Placed on additional oxygen support and brought to ED. Collateral history obtained from wife reports patient has been complaining about right foot pain after stepping on a nail within the last week.  Right foot noted to be erythematous with purulent drainage, U/S extremity did not reveal fluid collection. X-ray foot revealed 1.0cm metallic foreign body within plantar soft tissue. Started on vancomycin and zosyn. Podiatry consulted.     At initial encounter: BP elevated, SpO2 86-98% on 6L NC with humidification, (-) Leukocytosis, CRP 41.5, BNP elevated, (-) Viral testing. CXR: Cardiomegaly with pulmonary vascular congestion and mild perihilar edema. Aerobic culture sent.

## 2024-12-29 NOTE — ASSESSMENT & PLAN NOTE
Patient presented with respiratory failure, edema in lower extremities.  Urinary output of 2.9L after 40mg lasix with improvement of dyspnea. Currently has minimal fluid in lower extremities.  Suspect exacerbation of heart failure in the setting of cellulitis.      Administer one more dose of IV lasix 20mg in AM  Continue GDMT  Monitor urine output  Telemetry

## 2024-12-29 NOTE — ASSESSMENT & PLAN NOTE
Patient's COPD is controlled currently.  Patient is currently off COPD Pathway. Continue scheduled inhalers  , Duonebs PRN  and monitor respiratory status closely.

## 2024-12-29 NOTE — SUBJECTIVE & OBJECTIVE
Scheduled Meds:   albuterol-ipratropium  3 mL Nebulization Q6H WAKE    budesonide  1 mg Nebulization Q12H    And    arformoteroL  15 mcg Nebulization BID    buPROPion  150 mg Oral Daily    enoxparin  40 mg Subcutaneous Q12H (prophylaxis, 0900/2100)    furosemide (LASIX) injection  40 mg Intravenous Daily    gabapentin  300 mg Oral BID    lisinopriL  20 mg Oral Daily    And    hydroCHLOROthiazide  25 mg Oral Daily    losartan  50 mg Oral Daily    metoprolol tartrate  25 mg Oral BID    montelukast  10 mg Oral Daily    mupirocin   Nasal BID    pantoprazole  40 mg Oral Daily    piperacillin-tazobactam (Zosyn) IV (PEDS and ADULTS) (extended infusion is not appropriate)  3.375 g Intravenous Q8H    vancomycin (VANCOCIN) IV (PEDS and ADULTS)  2,000 mg Intravenous Q12H     Continuous Infusions:  PRN Meds:  Current Facility-Administered Medications:     acetaminophen, 650 mg, Oral, Q8H PRN    acetaminophen, 650 mg, Oral, Q4H PRN    ketorolac, 15 mg, Intravenous, Q6H PRN    melatonin, 6 mg, Oral, Nightly PRN    ondansetron, 4 mg, Intravenous, Q12H PRN    senna-docusate 8.6-50 mg, 1 tablet, Oral, BID PRN    sodium chloride 0.9%, 10 mL, Intravenous, PRN    Pharmacy to dose Vancomycin consult, , , Once **AND** vancomycin - pharmacy to dose, , Intravenous, pharmacy to manage frequency    Review of patient's allergies indicates:   Allergen Reactions    Dog dander Hives    House dust Hives    Maple flavor Other (See Comments)     Throat swelling      Pollen extracts Hives        Past Medical History:   Diagnosis Date    CHF (congestive heart failure)     COPD (chronic obstructive pulmonary disease)     HLD (hyperlipidemia)     Hypertension     MI, old      Past Surgical History:   Procedure Laterality Date    CAROTID ENDARTERECTOMY Left 11/17/2022    Procedure: ENDARTERECTOMY-CAROTID;  Surgeon: ELIDA Guadalupe II, MD;  Location: [facility] OR [address] [place] [address];  Service: Vascular;  Laterality: Left;    LAPAROSCOPIC CHOLECYSTECTOMY N/A 4/11/2022     Procedure: CHOLECYSTECTOMY, LAPAROSCOPIC;  Surgeon: Neville Hutchinson MD;  Location: [facility];  Service: General;  Laterality: N/A;    TONSILLECTOMY         Family History       Problem Relation (Age of Onset)    Hypertension Father          Tobacco Use    Smoking status: Every Day     Current packs/day: 1.00     Average packs/day: 1 pack/day for 30.0 years (30.0 ttl pk-yrs)     Types: Cigarettes    Smokeless tobacco: Not on file   Substance and Sexual Activity    Alcohol use: Not Currently    Drug use: Yes     Types: Marijuana     Comment: 3 to 4 times a week    Sexual activity: Yes     Partners: Female     Review of Systems   Constitutional:  Negative for chills and fever.   HENT:  Negative for ear pain, rhinorrhea, sinus pain and sore throat.    Eyes:  Negative for visual disturbance.   Respiratory:  Positive for shortness of breath. Negative for cough and wheezing.    Cardiovascular:  Positive for leg swelling. Negative for chest pain and palpitations.   Gastrointestinal:  Negative for abdominal pain, constipation, diarrhea, nausea and vomiting.   Endocrine: Negative for polyuria.   Genitourinary:  Negative for dysuria and hematuria.   Musculoskeletal:  Negative for back pain.   Skin:  Positive for color change and wound. Negative for rash.   Neurological:  Negative for syncope, weakness, numbness and headaches.   Psychiatric/Behavioral:  Negative for behavioral problems.      Objective:     Vital Signs (Most Recent):  Temp: 97.8 °F (36.6 °C) (12/29/24 1200)  Pulse: 76 (12/29/24 1413)  Resp: 20 (12/29/24 1413)  BP: (!) 149/77 (12/29/24 1400)  SpO2: (!) 90 % (12/29/24 1413) Vital Signs (24h Range):  Temp:  [97.2 °F (36.2 °C)-97.9 °F (36.6 °C)] 97.8 °F (36.6 °C)  Pulse:  [69-97] 76  Resp:  [13-27] 20  SpO2:  [86 %-98 %] 90 %  BP: (115-194)/() 149/77     Weight: (!) 140.9 kg (310 lb 10.1 oz)  Body mass index is 45.21 kg/m².    Foot Exam    Right Foot/Ankle     Inspection and Palpation  Ecchymosis:  "none  Tenderness: (Plantar aspect at punch wound)  Swelling: (Mild edema of th edorsum of the foot)  Arch: normal  Hammertoes: absent  Claw Toes: absent  Hallux valgus: no  Hallux limitus: no  Skin Exam: drainage (scant sanginous with expression of puncture wound), cellulitis, ulcer (pin hole puncture wound) and erythema; no blister, no callus and skin not intact     Neurovascular  Dorsalis pedis: 1+  Posterior tibial: 1+  Saphenous nerve sensation: normal  Tibial nerve sensation: normal  Superficial peroneal nerve sensation: normal  Deep peroneal nerve sensation: normal  Sural nerve sensation: normal    Muscle Strength  Ankle dorsiflexion: 5  Ankle plantar flexion: 5  Ankle inversion: 5  Ankle eversion: 5  Great toe extension: 5  Great toe flexion: 5    Comments  Plantar aspect with erythema about the puncture site, pain with palpation, No crepitation or fluctuance. No purulence appreciated with I&D and foreign body removal.           Laboratory:  CBC:   Recent Labs   Lab 12/28/24  1805 12/29/24  0515   WBC 8.87  --    RBC 4.91  --    HGB 15.9  --    HCT 51.2 51     --    *  --    MCH 32.4*  --    MCHC 31.1*  --      CMP:   Recent Labs   Lab 12/28/24  1805      CALCIUM 9.1   ALBUMIN 3.6   PROT 7.3   *   K 5.1   CO2 32*   CL 93*   BUN 12   CREATININE 0.8   ALKPHOS 88   ALT 40   AST 26   BILITOT 1.4*     CRP:   Recent Labs   Lab 12/28/24  1805   CRP 41.5*     Wound Cultures: No results for input(s): "LABAERO" in the last 4320 hours.  Microbiology Results (last 7 days)       Procedure Component Value Units Date/Time    Blood culture #1 **CANNOT BE ORDERED STAT** [[id number]] Collected: 12/28/24 1845    Order Status: Completed Specimen: Blood Updated: 12/29/24 1517     Blood Culture, Routine No Growth to date    Blood culture #2 **CANNOT BE ORDERED STAT** [[id number]] Collected: 12/28/24 1850    Order Status: Completed Specimen: Blood Updated: 12/29/24 1517     Blood Culture, Routine No " Growth to date    Culture, Anaerobe [[id number]] Collected: 12/29/24 1410    Order Status: Sent Specimen: Wound from Foot, Right Updated: 12/29/24 1437    Aerobic culture [[id number]] Collected: 12/29/24 1410    Order Status: Sent Specimen: Wound from Foot, Right Updated: 12/29/24 1437    Aerobic culture [[id number]] Collected: 12/28/24 1906    Order Status: Sent Specimen: Abscess from Foot, Right Updated: 12/29/24 0847    Influenza A & B by Molecular [[id number]] Collected: 12/28/24 1832    Order Status: Completed Specimen: Nasopharyngeal Swab Updated: 12/28/24 1932     Influenza A, Molecular Negative     Influenza B, Molecular Negative     Flu A & B Source Nasal swab          Specimen (24h ago, onward)       Start     Ordered    12/29/24 1334  Specimen to Pathology - Surgery  Once        Comments: Pre-op Diagnosis: * No surgery found * Number of specimens: 1Name of specimens: Right foot foreign body     Question:  Release to patient  Answer:  Immediate    12/29/24 1333                  All pertinent labs reviewed within the last 24 hours.    Diagnostic Results:  I have reviewed all pertinent imaging results/findings within the past 24 hours.    Clinical Findings:

## 2024-12-29 NOTE — H&P
[facility]  History & Physical    Patient Name: Renard Jimenez  MRN: [id number]  Patient Class: IP- Inpatient  Admission Date: 12/28/2024  Attending Physician: No att. providers found   Primary Care Provider: Juancho Ortiz MD         Patient information was obtained from patient and ER records.     Subjective:     Principal Problem:Acute on chronic respiratory failure with hypoxia and hypercapnia    Chief Complaint:   Chief Complaint   Patient presents with    Shortness of Breath     Sob for the past few days. Pt is hallucinating rainbows. Pt is always on 1.5L of oxygen. Pt was 64% on 1.5L on EMS arrival. On nonrebreather now.         HPI: 59 year old male with comorbid conditions of COPD on 1.5 lpm home oxygen, HFpEF, HLD, hypertension, h/o MI presented to [facility] due to shortness of breath.  Per EMS was saturating 64% on 1.5lpm nasal cannula, was placed on additional oxygen and brought to ED.  Per wife patient was also complaining of right foot pain after stepping on a nail 1 week prior.  Denies chest pain, fevers, nausea/vomiting, diarrhea.   On evaluation patient has crackles bilaterally, edema in extremities. BNP is 676. CXR revealed pulmonary vascular congestion.  Was given 40mg IV lasix with over 2 liter output.  Right foot noted to be erythematous with purulent drainage, U/S extremity did not reveal fluid collection.  X-ray foot revealed 1.0cm metallic foreign body within plantar soft tissue.  Started on vancomycin and zosyn.  Podiatry notified.  Patient was started on nicardipine infusion for blood pressure reaching > .    Blood gas revealed hypercapnia for which patient was started on biPAP.  Also given IV solumedrol, magnesium. Patient was transferred to [facility] for continuous bipap requirement.      Past Medical History:   Diagnosis Date    CHF (congestive heart failure)     COPD (chronic obstructive pulmonary disease)     HLD (hyperlipidemia)     Hypertension      MI, old        Past Surgical History:   Procedure Laterality Date    CAROTID ENDARTERECTOMY Left 11/17/2022    Procedure: ENDARTERECTOMY-CAROTID;  Surgeon: ELIDA Guadalupe II, MD;  Location: [address];  Service: Vascular;  Laterality: Left;    LAPAROSCOPIC CHOLECYSTECTOMY N/A 4/11/2022    Procedure: CHOLECYSTECTOMY, LAPAROSCOPIC;  Surgeon: Neville Hutchinson MD;  Location: [facility];  Service: General;  Laterality: N/A;    TONSILLECTOMY         Review of patient's allergies indicates:   Allergen Reactions    Dog dander Hives    House dust Hives    Maple flavor Other (See Comments)     Throat swelling      Pollen extracts Hives       Current Facility-Administered Medications on File Prior to Encounter   Medication    diphenhydrAMINE injection 25 mg    HYDROmorphone injection 0.2 mg    prochlorperazine injection Soln 5 mg    sodium chloride 0.9% flush 10 mL     Current Outpatient Medications on File Prior to Encounter   Medication Sig    diclofenac (VOLTAREN) 75 MG EC tablet Take 75 mg by mouth.    famotidine (PEPCID) 40 MG tablet Take 40 mg by mouth daily as needed for Heartburn.    hydroCHLOROthiazide (HYDRODIURIL) 25 MG tablet Take 1 tablet by mouth once daily.    acetaminophen (TYLENOL) 500 MG tablet Take 2 tablets (1,000 mg total) by mouth every 6 (six) hours as needed for Pain.    albuterol (PROVENTIL) 2.5 mg /3 mL (0.083 %) nebulizer solution Take 2.5 mg by nebulization every 4 (four) hours as needed.    albuterol (PROVENTIL/VENTOLIN HFA) 90 mcg/actuation inhaler Inhale 2 puffs into the lungs every 6 (six) hours as needed.    aspirin 81 MG Chew Take 1 tablet (81 mg total) by mouth once daily.    atorvastatin (LIPITOR) 80 MG tablet Take 1 tablet (80 mg total) by mouth once daily.    budesonide-formoterol 160-4.5 mcg (SYMBICORT) 160-4.5 mcg/actuation HFAA Inhale 2 puffs into the lungs 2 (two) times daily.    buPROPion (WELLBUTRIN XL) 150 MG TB24 tablet Take 150 mg by mouth once daily.    cholecalciferol,  vitamin D3, 1,250 mcg (50,000 unit) capsule Take 1 capsule by mouth every 7 days.    clopidogreL (PLAVIX) 75 mg tablet Take 1 tablet (75 mg total) by mouth once daily. (Patient taking differently: Take 75 mg by mouth every evening.)    fluticasone furoate-vilanteroL (BREO) 200-25 mcg/dose DsDv diskus inhaler Breo Ellipta 200 mcg-25 mcg/dose powder for inhalation   Inhale by inhalation route for 30 days.    fluticasone propionate (FLONASE) 50 mcg/actuation nasal spray 1 spray by Each Nostril route once daily.    gabapentin (NEURONTIN) 300 MG capsule Take 300 mg by mouth 2 (two) times daily.    LIDOcaine-prilocaine (EMLA) cream Apply 1 g topically daily as needed.    lisinopriL-hydrochlorothiazide (PRINZIDE,ZESTORETIC) 20-25 mg Tab lisinopril 20 mg-hydrochlorothiazide 25 mg tablet   TAKE ONE TABLET BY MOUTH EVERY DAY    losartan (COZAAR) 50 MG tablet Take 50 mg by mouth once daily.    metoprolol tartrate (LOPRESSOR) 25 MG tablet Take 1 tablet (25 mg total) by mouth 2 (two) times daily.    montelukast (SINGULAIR) 10 mg tablet Take 1 tablet by mouth once daily.    mupirocin (BACTROBAN) 2 % ointment Apply topically 3 (three) times daily.    pantoprazole (PROTONIX) 40 MG tablet Take 40 mg by mouth once daily.    TRELEGY ELLIPTA 200-62.5-25 mcg inhaler Inhale 1 puff into the lungs once daily.    valACYclovir (VALTREX) 1000 MG tablet Take 1 tablet (1,000 mg total) by mouth every 12 (twelve) hours. for 7 days     Family History       Problem Relation (Age of Onset)    Hypertension Father          Tobacco Use    Smoking status: Every Day     Current packs/day: 1.00     Average packs/day: 1 pack/day for 30.0 years (30.0 ttl pk-yrs)     Types: Cigarettes    Smokeless tobacco: Not on file   Substance and Sexual Activity    Alcohol use: Not Currently    Drug use: Yes     Types: Marijuana     Comment: 3 to 4 times a week    Sexual activity: Yes     Partners: Female     Review of Systems   Constitutional:  Negative for chills and  fever.   HENT:  Negative for ear pain, rhinorrhea, sinus pain and sore throat.    Eyes:  Negative for visual disturbance.   Respiratory:  Positive for shortness of breath. Negative for cough and wheezing.    Cardiovascular:  Positive for leg swelling. Negative for chest pain and palpitations.   Gastrointestinal:  Negative for abdominal pain, constipation, diarrhea, nausea and vomiting.   Endocrine: Negative for polyuria.   Genitourinary:  Negative for dysuria and hematuria.   Musculoskeletal:  Negative for back pain.   Skin:  Positive for color change and wound. Negative for rash.   Neurological:  Negative for syncope, weakness, numbness and headaches.   Psychiatric/Behavioral:  Negative for behavioral problems.      Objective:     Vital Signs (Most Recent):  Temp: 97.9 °F (36.6 °C) (12/28/24 2310)  Pulse: 88 (12/29/24 0154)  Resp: (!) 22 (12/29/24 0154)  BP: (!) 144/74 (12/29/24 0100)  SpO2: (!) 93 % (12/29/24 0154) Vital Signs (24h Range):  Temp:  [97.2 °F (36.2 °C)-97.9 °F (36.6 °C)] 97.9 °F (36.6 °C)  Pulse:  [70-88] 88  Resp:  [20-27] 22  SpO2:  [86 %-97 %] 93 %  BP: (115-194)/() 144/74     Weight: (!) 140.9 kg (310 lb 10.1 oz)  Body mass index is 45.21 kg/m².     Physical Exam  Constitutional:       General: He is not in acute distress.     Appearance: Normal appearance.      Comments: On bipap   HENT:      Head: Normocephalic and atraumatic.      Nose: No rhinorrhea.   Eyes:      General: No scleral icterus.     Extraocular Movements: Extraocular movements intact.      Pupils: Pupils are equal, round, and reactive to light.   Cardiovascular:      Rate and Rhythm: Normal rate and regular rhythm.      Pulses: Normal pulses.      Heart sounds: Normal heart sounds. No murmur heard.  Pulmonary:      Effort: Pulmonary effort is normal. No respiratory distress.      Breath sounds: Normal breath sounds. No wheezing or rales.   Abdominal:      General: There is no distension.      Palpations: Abdomen is soft.  "  Musculoskeletal:      Right lower leg: Edema present.      Left lower leg: Edema present.      Comments: Trace/mild edema in bilateral lower extremities   Skin:     General: Skin is warm.      Coloration: Skin is not jaundiced.      Findings: Erythema present. No rash.      Comments: Patient has point lesion on ventral  right mid foot with clear drainage, anterior foot is erythematous.  Erythema extends to anterior leg    Neurological:      General: No focal deficit present.      Mental Status: He is alert. Mental status is at baseline.   Psychiatric:         Mood and Affect: Mood normal.              CRANIAL NERVES     CN III, IV, VI   Pupils are equal, round, and reactive to light.       Significant Labs: All pertinent labs within the past 24 hours have been reviewed.  ABGs:   Recent Labs   Lab 12/28/24  1928 12/28/24 2053 12/28/24 2108   PH 7.303* 7.263* 7.317*   PCO2 68.1* 77.4* 70.9*   HCO3 33.7* 34.9* 36.3*   POCSATURATED 91 73 92   BE 7* 8* 10*   PO2 69* 46 72*     Blood Culture: No results for input(s): "LABBLOO" in the last 48 hours.  BMP:   Recent Labs   Lab 12/28/24  1805      *   K 5.1   CL 93*   CO2 32*   BUN 12   CREATININE 0.8   CALCIUM 9.1     CBC:   Recent Labs   Lab 12/28/24  1805   WBC 8.87   HGB 15.9   HCT 51.2        CMP:   Recent Labs   Lab 12/28/24  1805   *   K 5.1   CL 93*   CO2 32*      BUN 12   CREATININE 0.8   CALCIUM 9.1   PROT 7.3   ALBUMIN 3.6   BILITOT 1.4*   ALKPHOS 88   AST 26   ALT 40   ANIONGAP 7*     Cardiac Markers:   Recent Labs   Lab 12/28/24  1805   *     Lipase: No results for input(s): "LIPASE" in the last 48 hours.  Lipid Panel: No results for input(s): "CHOL", "HDL", "LDLCALC", "TRIG", "CHOLHDL" in the last 48 hours.  Troponin:   Recent Labs   Lab 12/28/24  1805 12/28/24 2122   TROPONINI <0.006 0.012     Urine Culture: No results for input(s): "LABURIN" in the last 48 hours.  Urine Studies: No results for input(s): "COLORU", " ""APPEARANCEUA", "PHUR", "SPECGRAV", "PROTEINUA", "GLUCUA", "KETONESU", "BILIRUBINUA", "OCCULTUA", "NITRITE", "UROBILINOGEN", "LEUKOCYTESUR", "RBCUA", "WBCUA", "BACTERIA", "SQUAMEPITHEL", "HYALINECASTS" in the last 48 hours.    Invalid input(s): "WRIGHTSUR"    Significant Imaging: I have reviewed all pertinent imaging results/findings within the past 24 hours.  Assessment/Plan:     * Acute on chronic respiratory failure with hypoxia and hypercapnia  Patient with Hypercapnic and Hypoxic Respiratory failure which is Acute on chronic.  he is on home oxygen at 1.5 LPM. Supplemental oxygen was provided and noted- Oxygen Concentration (%):  [50] 50    .   Signs/symptoms of respiratory failure include- increased work of breathing and respiratory distress. Contributing diagnoses includes - CHF and COPD Labs and images were reviewed. Patient Has recent ABG, which has been reviewed. Will treat underlying causes and adjust management of respiratory failure as follows-     Continue BiPAP - wean per guidelines  Trend blood gases      Chronic heart failure with preserved ejection fraction  Patient presented with respiratory failure, edema in lower extremities.  Urinary output of 2.9L after 40mg lasix with improvement of dyspnea. Currently has minimal fluid in lower extremities.  Suspect exacerbation of heart failure in the setting of cellulitis.      Administer one more dose of IV lasix 20mg in AM  Continue GDMT  Monitor urine output  Telemetry      Cellulitis  Patient has cellulitis of right foot with retained foreign body after stepping on nail.    Continue antibiotics - vancomycin, zosyn  Podiatry evaluation   Pain control PRN       Pulmonary emphysema  Patient's COPD is controlled currently.  Patient is currently off COPD Pathway. Continue scheduled inhalers  , Duonebs PRN  and monitor respiratory status closely.     Mixed hyperlipidemia  Continue statin       Essential hypertension  Patient's blood pressure range in the last " "24 hours was: BP  Min: 115/59  Max: 194/105.The patient's inpatient anti-hypertensive regimen is listed below:  Current Antihypertensives  , Daily, Oral  , Daily, Oral  losartan tablet 50 mg, Daily, Oral  metoprolol tartrate (LOPRESSOR) tablet 25 mg, 2 times daily, Oral  furosemide injection 20 mg, Daily, Intravenous  lisinopriL tablet 20 mg, Daily, Oral  hydroCHLOROthiazide tablet 25 mg, Daily, Oral    Plan  - BP is controlled, no changes needed to their regimen      Skin Lesions  Patient has scattered erythematous papulous excoriations throughout abdomen and all extremities - suspect bed bug bites    -Contact isolation         VTE Risk Mitigation (From admission, onward)           Ordered     enoxaparin injection 40 mg  Every 12 hours        Note to Pharmacy: Ht: 5' 9.5" (1.765 m)  Wt: (!) 140.9 kg (310 lb 10.1 oz)  Estimated Creatinine Clearance: 138.2 mL/min (based on SCr of 0.8 mg/dL).  Body mass index is 45.21 kg/m².    12/29/24 0024     IP VTE HIGH RISK PATIENT  Once         12/29/24 0013     Place sequential compression device  Until discontinued         12/29/24 0013                  Critical care time spent on the evaluation and treatment of severe organ dysfunction, review of pertinent labs and imaging studies, discussions with consulting providers and discussions with patient/family: 45 minutes.             VANCOMYCIN PHARMACOKINETIC NOTE:  Vancomycin Day # 1    Objective/Assessment:    Diagnosis/Indication for Vancomycin:Skin & Soft Tissue infection      59 y.o., male; Actual Body Weight = (!) 140.9 kg (310 lb 10.1 oz).    The patient has the following labs:  12/29/2024 Estimated Creatinine Clearance: 138.2 mL/min (based on SCr of 0.8 mg/dL). Lab Results   Component Value Date    BUN 12 12/28/2024     Lab Results   Component Value Date    WBC 8.87 12/28/2024          Plan:  Adjust vancomycin dose and/or frequency based on the patient's actual weight and renal function:  Initiate Vancomycin 2000 mg IV " every 12 hours.  Orders have been entered into patient's chart.        Vancomycin trough level has been ordered for 0700 on 12/30.    Pharmacy will manage vancomycin therapy, monitor serum vancomycin levels, monitor renal function and adjust regimen as necessary.    Thank you for allowing us to participate in this patient's care.     Shasta Scales 12/29/2024   Department of Pharmacy  Ext [phone]        Rony Garduno MD  Department of Hospital Medicine  [facility]

## 2024-12-29 NOTE — CONSULTS
[facility]  Podiatry  Consult Note    Patient Name: Renard Jimenez  MRN: [id number]  Admission Date: 12/28/2024  Hospital Length of Stay: 1 days  Attending Physician: Toni Herrera MD  Primary Care Provider: Juancho Ortiz MD     Inpatient consult to Podiatry  Consult performed by: Darci Oreilly DPM  Consult ordered by: Rony Garduno MD        Subjective:     History of Present Illness:  59-year-old male presents for evaluation of shortness a breath. Wears 1-1/2 L oxygen at baseline, history of heart failure and COPD. EMS called out yesterday for dyspnea and oxygen 64% on 1-1/2 L. Placed on additional oxygen support and brought to ED. Collateral history obtained from wife reports patient has been complaining about right foot pain after stepping on a nail within the last week.  Right foot noted to be erythematous with purulent drainage, U/S extremity did not reveal fluid collection. X-ray foot revealed 1.0cm metallic foreign body within plantar soft tissue. Started on vancomycin and zosyn. Podiatry consulted.     At initial encounter: BP elevated, SpO2 86-98% on 6L NC with humidification, (-) Leukocytosis, CRP 41.5, BNP elevated, (-) Viral testing. CXR: Cardiomegaly with pulmonary vascular congestion and mild perihilar edema. Aerobic culture sent.     Scheduled Meds:   albuterol-ipratropium  3 mL Nebulization Q6H WAKE    budesonide  1 mg Nebulization Q12H    And    arformoteroL  15 mcg Nebulization BID    buPROPion  150 mg Oral Daily    enoxparin  40 mg Subcutaneous Q12H (prophylaxis, 0900/2100)    furosemide (LASIX) injection  40 mg Intravenous Daily    gabapentin  300 mg Oral BID    lisinopriL  20 mg Oral Daily    And    hydroCHLOROthiazide  25 mg Oral Daily    losartan  50 mg Oral Daily    metoprolol tartrate  25 mg Oral BID    montelukast  10 mg Oral Daily    mupirocin   Nasal BID    pantoprazole  40 mg Oral Daily    piperacillin-tazobactam (Zosyn) IV (PEDS and ADULTS) (extended infusion is  not appropriate)  3.375 g Intravenous Q8H    vancomycin (VANCOCIN) IV (PEDS and ADULTS)  2,000 mg Intravenous Q12H     Continuous Infusions:  PRN Meds:  Current Facility-Administered Medications:     acetaminophen, 650 mg, Oral, Q8H PRN    acetaminophen, 650 mg, Oral, Q4H PRN    ketorolac, 15 mg, Intravenous, Q6H PRN    melatonin, 6 mg, Oral, Nightly PRN    ondansetron, 4 mg, Intravenous, Q12H PRN    senna-docusate 8.6-50 mg, 1 tablet, Oral, BID PRN    sodium chloride 0.9%, 10 mL, Intravenous, PRN    Pharmacy to dose Vancomycin consult, , , Once **AND** vancomycin - pharmacy to dose, , Intravenous, pharmacy to manage frequency    Review of patient's allergies indicates:   Allergen Reactions    Dog dander Hives    House dust Hives    Maple flavor Other (See Comments)     Throat swelling      Pollen extracts Hives        Past Medical History:   Diagnosis Date    CHF (congestive heart failure)     COPD (chronic obstructive pulmonary disease)     HLD (hyperlipidemia)     Hypertension     MI, old      Past Surgical History:   Procedure Laterality Date    CAROTID ENDARTERECTOMY Left 11/17/2022    Procedure: ENDARTERECTOMY-CAROTID;  Surgeon: ELIDA Guadalupe II, MD;  Location: [address];  Service: Vascular;  Laterality: Left;    LAPAROSCOPIC CHOLECYSTECTOMY N/A 4/11/2022    Procedure: CHOLECYSTECTOMY, LAPAROSCOPIC;  Surgeon: Neville Hutchinson MD;  Location: [facility];  Service: General;  Laterality: N/A;    TONSILLECTOMY         Family History       Problem Relation (Age of Onset)    Hypertension Father          Tobacco Use    Smoking status: Every Day     Current packs/day: 1.00     Average packs/day: 1 pack/day for 30.0 years (30.0 ttl pk-yrs)     Types: Cigarettes    Smokeless tobacco: Not on file   Substance and Sexual Activity    Alcohol use: Not Currently    Drug use: Yes     Types: Marijuana     Comment: 3 to 4 times a week    Sexual activity: Yes     Partners: Female     Review of Systems   Constitutional:   Negative for chills and fever.   HENT:  Negative for ear pain, rhinorrhea, sinus pain and sore throat.    Eyes:  Negative for visual disturbance.   Respiratory:  Positive for shortness of breath. Negative for cough and wheezing.    Cardiovascular:  Positive for leg swelling. Negative for chest pain and palpitations.   Gastrointestinal:  Negative for abdominal pain, constipation, diarrhea, nausea and vomiting.   Endocrine: Negative for polyuria.   Genitourinary:  Negative for dysuria and hematuria.   Musculoskeletal:  Negative for back pain.   Skin:  Positive for color change and wound. Negative for rash.   Neurological:  Negative for syncope, weakness, numbness and headaches.   Psychiatric/Behavioral:  Negative for behavioral problems.      Objective:     Vital Signs (Most Recent):  Temp: 97.8 °F (36.6 °C) (12/29/24 1200)  Pulse: 76 (12/29/24 1413)  Resp: 20 (12/29/24 1413)  BP: (!) 149/77 (12/29/24 1400)  SpO2: (!) 90 % (12/29/24 1413) Vital Signs (24h Range):  Temp:  [97.2 °F (36.2 °C)-97.9 °F (36.6 °C)] 97.8 °F (36.6 °C)  Pulse:  [69-97] 76  Resp:  [13-27] 20  SpO2:  [86 %-98 %] 90 %  BP: (115-194)/() 149/77     Weight: (!) 140.9 kg (310 lb 10.1 oz)  Body mass index is 45.21 kg/m².    Foot Exam    Right Foot/Ankle     Inspection and Palpation  Ecchymosis: none  Tenderness: (Plantar aspect at punch wound)  Swelling: (Mild edema of th edorsum of the foot)  Arch: normal  Hammertoes: absent  Claw Toes: absent  Hallux valgus: no  Hallux limitus: no  Skin Exam: drainage (scant sanginous with expression of puncture wound), cellulitis, ulcer (pin hole puncture wound) and erythema; no blister, no callus and skin not intact     Neurovascular  Dorsalis pedis: 1+  Posterior tibial: 1+  Saphenous nerve sensation: normal  Tibial nerve sensation: normal  Superficial peroneal nerve sensation: normal  Deep peroneal nerve sensation: normal  Sural nerve sensation: normal    Muscle Strength  Ankle dorsiflexion: 5  Ankle  "plantar flexion: 5  Ankle inversion: 5  Ankle eversion: 5  Great toe extension: 5  Great toe flexion: 5    Comments  Plantar aspect with erythema about the puncture site, pain with palpation, No crepitation or fluctuance. No purulence appreciated with I&D and foreign body removal.           Laboratory:  CBC:   Recent Labs   Lab 12/28/24  1805 12/29/24  0515   WBC 8.87  --    RBC 4.91  --    HGB 15.9  --    HCT 51.2 51     --    *  --    MCH 32.4*  --    MCHC 31.1*  --      CMP:   Recent Labs   Lab 12/28/24  1805      CALCIUM 9.1   ALBUMIN 3.6   PROT 7.3   *   K 5.1   CO2 32*   CL 93*   BUN 12   CREATININE 0.8   ALKPHOS 88   ALT 40   AST 26   BILITOT 1.4*     CRP:   Recent Labs   Lab 12/28/24 1805   CRP 41.5*     Wound Cultures: No results for input(s): "LABAERO" in the last 4320 hours.  Microbiology Results (last 7 days)       Procedure Component Value Units Date/Time    Blood culture #1 **CANNOT BE ORDERED STAT** [[id number]] Collected: 12/28/24 1845    Order Status: Completed Specimen: Blood Updated: 12/29/24 1517     Blood Culture, Routine No Growth to date    Blood culture #2 **CANNOT BE ORDERED STAT** [[id number]] Collected: 12/28/24 1850    Order Status: Completed Specimen: Blood Updated: 12/29/24 1517     Blood Culture, Routine No Growth to date    Culture, Anaerobe [[id number]] Collected: 12/29/24 1410    Order Status: Sent Specimen: Wound from Foot, Right Updated: 12/29/24 1437    Aerobic culture [[id number]] Collected: 12/29/24 1410    Order Status: Sent Specimen: Wound from Foot, Right Updated: 12/29/24 1437    Aerobic culture [[id number]] Collected: 12/28/24 1906    Order Status: Sent Specimen: Abscess from Foot, Right Updated: 12/29/24 0847    Influenza A & B by Molecular [[id number]] Collected: 12/28/24 1832    Order Status: Completed Specimen: Nasopharyngeal Swab Updated: 12/28/24 1932     Influenza A, Molecular Negative     Influenza B, Molecular Negative     Flu A & B " Source Nasal swab          Specimen (24h ago, onward)       Start     Ordered    12/29/24 1334  Specimen to Pathology - Surgery  Once        Comments: Pre-op Diagnosis: * No surgery found * Number of specimens: 1Name of specimens: Right foot foreign body     Question:  Release to patient  Answer:  Immediate    12/29/24 1333                  All pertinent labs reviewed within the last 24 hours.    Diagnostic Results:  I have reviewed all pertinent imaging results/findings within the past 24 hours.    Clinical Findings:                      Assessment/Plan:     ID  Cellulitis  Right foot SSTI 2/2 puncture wound and retained foreign body see on right foot xray. Soft tissue US negative for abscess. No STG on xray.     Patient can have diet, informed primary team  Discussed removal of foreign body bedside vs in the OR setting including benefits, risks, and potential complications. I recommended bedside with local infiltration, patient was in agreement and amenable.   Lidocaine order   Informed consent for Right plantar foot I&D and foreign body removal obtained; see procedure brief   Foreign body was removed without complication, no findings to contraindicate primary closure after copious 1 L saline irrigation.   Deep wound swab was obtained and sent for culture   Metallic needle tip looking object was sent to pathology, see media  Foot was dressed with xeroform, 4x4s, kerlix, and ace  Repeat Right foot xray ordered to confirm complete removal of foreign body.   Recommend at least two week empiric ABx coverage for puncture wound, tetanus prophylaxis done in ED last night. Pending cultures Abx can be optimized.   WB: Right foot minimal Partial WBAT with post procedure shoe and dressing intact. Patient should keep as much pressure off the forefoot as possible and cheat the pressure to his heel for short distance, 5-10 feet, and transfer, otherwise keep elevated above the level of the heart while in bed or bedside recliner.    Discussed PRN pain meds for right foot post procedure, will likely have discomfort in the next 1-2 hours   If there is bleeding on the dressings, reinforce with ABD, kerlix, and ace  Patient will need to follow up in podiatry clinic in about 2.5 weeks for removal of sutures      Orthopedic  Puncture wound of right foot with foreign body  See rest of plan     Thank you for your consult. I will follow-up with patient. Please contact us if you have any additional questions.    Darci Oreilly DPM  Podiatry  [facility]

## 2024-12-29 NOTE — CARE UPDATE
12/29/24 0830   PRE-TX-O2   Device (Oxygen Therapy) nasal cannula with humidification   $ Is the patient on Low Flow Oxygen? Yes   Flow (L/min) (Oxygen Therapy) 6   SpO2 (!) 93 %   Pulse Oximetry Type Continuous   $ Pulse Oximetry - Multiple Charge Pulse Oximetry - Multiple   Pulse 97   Resp 13   Preset CPAP/BiPAP Settings   Mode Of Delivery standby  (dr smalls wanted pt off bipap)   CPAP/BIPAP charged w/in last 24 h YES

## 2024-12-29 NOTE — ASSESSMENT & PLAN NOTE
Patient with Hypercapnic and Hypoxic Respiratory failure which is Acute on chronic.  he is on home oxygen at 1.5 LPM. Supplemental oxygen was provided and noted- Oxygen Concentration (%):  [50] 50    .   Signs/symptoms of respiratory failure include- increased work of breathing and respiratory distress. Contributing diagnoses includes - CHF and COPD Labs and images were reviewed. Patient Has recent ABG, which has been reviewed. Will treat underlying causes and adjust management of     Off  BiPAP and now in nasal canula   Start diet   Follow respiratory

## 2024-12-29 NOTE — PROGRESS NOTES
VANCOMYCIN PHARMACOKINETIC NOTE:  Vancomycin Day # 1    Objective/Assessment:    Diagnosis/Indication for Vancomycin:Skin & Soft Tissue infection      59 y.o., male; Actual Body Weight = (!) 140.9 kg (310 lb 10.1 oz).    The patient has the following labs:  12/29/2024 Estimated Creatinine Clearance: 138.2 mL/min (based on SCr of 0.8 mg/dL). Lab Results   Component Value Date    BUN 12 12/28/2024     Lab Results   Component Value Date    WBC 8.87 12/28/2024          Plan:  Adjust vancomycin dose and/or frequency based on the patient's actual weight and renal function:  Initiate Vancomycin 2000 mg IV every 12 hours.  Orders have been entered into patient's chart.        Vancomycin trough level has been ordered for 0700 on 12/30.    Pharmacy will manage vancomycin therapy, monitor serum vancomycin levels, monitor renal function and adjust regimen as necessary.    Thank you for allowing us to participate in this patient's care.     Shasta Scales 12/29/2024   Department of Pharmacy  Ext [phone]    · She has a history of schizoaffective disorder, intellectual disability, bipolar disorder, presented with worsening encephalopathy, lethargy for the past two weeks. · As per patient's sister, at her baseline she is generally very active, alert, able to maintain conversation, generally oriented despite having intellectual disability. · Patients family reports symptoms started approximately two weeks when Clozaril was discontinued for abnormal white blood cell count. At home dose of lithium 750 mg twice daily.    · Patient underwent hemodialysis x2 last treatment on 8/31/2023  · Toxicology and nephrology consultations and recommendations appreciated  · Psychiatry input appreciated  · Lithium levels improved  · Continue aggressive IV hydration   · as needed Zyprexa

## 2024-12-29 NOTE — PROGRESS NOTES
[facility] Medicine  Progress Note    Patient Name: Renard Jimenez  MRN: [id number]  Patient Class: IP- Inpatient   Admission Date: 12/28/2024  Length of Stay: 1 days  Attending Physician: Toni Herrera MD  Primary Care Provider: Juancho Ortiz MD        Subjective     Principal Problem:Acute on chronic respiratory failure with hypoxia and hypercapnia        HPI:  59 year old male with comorbid conditions of COPD on 1.5 lpm home oxygen, HFpEF, HLD, hypertension, h/o MI presented to [facility] due to shortness of breath.  Per EMS was saturating 64% on 1.5lpm nasal cannula, was placed on additional oxygen and brought to ED.  Per wife patient was also complaining of right foot pain after stepping on a nail 1 week prior.  Denies chest pain, fevers, nausea/vomiting, diarrhea.   On evaluation patient has crackles bilaterally, edema in extremities. BNP is 676. CXR revealed pulmonary vascular congestion.  Was given 40mg IV lasix with over 2 liter output.  Right foot noted to be erythematous with purulent drainage, U/S extremity did not reveal fluid collection.  X-ray foot revealed 1.0cm metallic foreign body within plantar soft tissue.  Started on vancomycin and zosyn.  Podiatry notified.  Patient was started on nicardipine infusion for blood pressure reaching > .    Blood gas revealed hypercapnia for which patient was started on biPAP.  Also given IV solumedrol, magnesium. Patient was transferred to [facility] for continuous bipap requirement.      Overview/Hospital Course:  59 year old male with comorbid conditions of COPD on 1.5 lpm home oxygen, HFpEF, HLD, hypertension, h/o MI presented to [facility] due to shortness of breath.  Per EMS was saturating 64% on 1.5lpm nasal cannula, was placed on additional oxygen and brought to ED.  Per wife patient was also complaining of right foot pain after stepping on a nail 1 week prior.  Denies chest pain, fevers, nausea/vomiting, diarrhea.   On evaluation patient  has crackles bilaterally, edema in extremities. BNP is 676. CXR revealed pulmonary vascular congestion.  Was given 40mg IV lasix with over 2 liter output.  Right foot noted to be erythematous with purulent drainage, U/S extremity did not reveal fluid collection.  X-ray foot revealed 1.0cm metallic foreign body within plantar soft tissue.  Started on vancomycin and zosyn.  Podiatry notified.  Patient was started on nicardipine infusion for blood pressure reaching > .    Blood gas revealed hypercapnia for which patient was started on biPAP.  Also given IV solumedrol, magnesium. Patient was transferred to [facility] for continuous bipap requirement.      Overnight admission and pls see H and P . BIPAP was able to wean     Interval History:     Seen and examined with RN and respiratory therapy   BIPAP was able to wean and on NC 6 L . UO  4 L around , very bloated . Labs stable . ABG better. Later started diet .  Podiatry MD saw .        Review of Systems  Objective:     Vital Signs (Most Recent):  Temp: 97.8 °F (36.6 °C) (12/29/24 1200)  Pulse: 71 (12/29/24 1300)  Resp: 18 (12/29/24 1300)  BP: 136/65 (12/29/24 1300)  SpO2: (!) 93 % (12/29/24 1300) Vital Signs (24h Range):  Temp:  [97.2 °F (36.2 °C)-97.9 °F (36.6 °C)] 97.8 °F (36.6 °C)  Pulse:  [69-97] 71  Resp:  [13-27] 18  SpO2:  [86 %-98 %] 93 %  BP: (115-194)/() 136/65     Weight: (!) 140.9 kg (310 lb 10.1 oz)  Body mass index is 45.21 kg/m².    Intake/Output Summary (Last 24 hours) at 12/29/2024 1400  Last data filed at 12/29/2024 1351  Gross per 24 hour   Intake 1331.58 ml   Output 6200 ml   Net -4868.42 ml         Physical Exam  Constitutional:       General: He is not in acute distress.     Appearance: He is well-developed.   HENT:      Head: Normocephalic.   Eyes:      Pupils: Pupils are equal, round, and reactive to light.   Cardiovascular:      Rate and Rhythm: Normal rate and regular rhythm.   Pulmonary:      Effort: Pulmonary effort is normal. No  respiratory distress.      Breath sounds: Rales present.   Abdominal:      General: Abdomen is flat. There is no distension.      Tenderness: There is no abdominal tenderness.   Musculoskeletal:         General: Normal range of motion.      Cervical back: Neck supple.   Skin:     General: Skin is warm.      Findings: No rash.   Neurological:      Mental Status: He is alert and oriented to person, place, and time.   Psychiatric:         Mood and Affect: Mood normal.             Significant Labs: All pertinent labs within the past 24 hours have been reviewed.  CBC:   Recent Labs   Lab 12/28/24  1805 12/29/24  0515   WBC 8.87  --    HGB 15.9  --    HCT 51.2 51     --      CMP:   Recent Labs   Lab 12/28/24  1805   *   K 5.1   CL 93*   CO2 32*      BUN 12   CREATININE 0.8   CALCIUM 9.1   PROT 7.3   ALBUMIN 3.6   BILITOT 1.4*   ALKPHOS 88   AST 26   ALT 40   ANIONGAP 7*     Cardiac Markers:   Recent Labs   Lab 12/28/24  1805   *       Significant Imaging: I have reviewed all pertinent imaging results/findings within the past 24 hours.    Assessment and Plan     * Acute on chronic respiratory failure with hypoxia and hypercapnia  Patient with Hypercapnic and Hypoxic Respiratory failure which is Acute on chronic.  he is on home oxygen at 1.5 LPM. Supplemental oxygen was provided and noted- Oxygen Concentration (%):  [50] 50    .   Signs/symptoms of respiratory failure include- increased work of breathing and respiratory distress. Contributing diagnoses includes - CHF and COPD Labs and images were reviewed. Patient Has recent ABG, which has been reviewed. Will treat underlying causes and adjust management of     Off  BiPAP and now in nasal canula   Start diet   Follow respiratory       Cellulitis  Patient has cellulitis of right foot with retained foreign body after stepping on nail.    Continue antibiotics - vancomycin, zosyn  Podiatry evaluation   Pain control PRN   Patient can eat      Chronic  "heart failure with preserved ejection fraction  Patient presented with respiratory failure, edema in lower extremities.  Urinary output of 2.9L after 40mg lasix with improvement of dyspnea. Currently has minimal fluid in lower extremities.  Suspect exacerbation of heart failure in the setting of cellulitis.      Getting lasix . Will continue with IV 40 mg daily   Continue GDMT  Monitor urine output  Telemetry  Soon downgrade  Repeat ECHO pending   Cardio MD cx if needed      Mixed hyperlipidemia  Continue statin       Essential hypertension  Patient's blood pressure range in the last 24 hours was: BP  Min: 115/59  Max: 194/105.The patient's inpatient anti-hypertensive regimen is listed below:  Current Antihypertensives  , Daily, Oral  , Daily, Oral  losartan tablet 50 mg, Daily, Oral  metoprolol tartrate (LOPRESSOR) tablet 25 mg, 2 times daily, Oral  lisinopriL tablet 20 mg, Daily, Oral  hydroCHLOROthiazide tablet 25 mg, Daily, Oral  furosemide injection 40 mg, Daily, Intravenous    Plan  - BP is controlled, no changes needed to their regimen    Pulmonary emphysema  Patient's COPD is controlled currently.  Patient is currently off COPD Pathway. Continue scheduled inhalers  , Duonebs PRN  and monitor respiratory status closely.       VTE Risk Mitigation (From admission, onward)           Ordered     enoxaparin injection 40 mg  Every 12 hours        Note to Pharmacy: Ht: 5' 9.5" (1.765 m)  Wt: (!) 140.9 kg (310 lb 10.1 oz)  Estimated Creatinine Clearance: 138.2 mL/min (based on SCr of 0.8 mg/dL).  Body mass index is 45.21 kg/m².    12/29/24 0024     IP VTE HIGH RISK PATIENT  Once         12/29/24 0013     Place sequential compression device  Until discontinued         12/29/24 0013                    Discharge Planning   TANIA: 1/1/2025     Code Status: Full Code   Medical Readiness for Discharge Date:   Discharge Plan A: Home with family            Critical care time spent on the evaluation and treatment of severe " organ dysfunction, review of pertinent labs and imaging studies, discussions with consulting providers and discussions with patient/family: 32 minutes.            Toni Herrera MD  Department of Hospital Medicine   [facility]

## 2024-12-29 NOTE — ASSESSMENT & PLAN NOTE
Patient's blood pressure range in the last 24 hours was: BP  Min: 115/59  Max: 194/105.The patient's inpatient anti-hypertensive regimen is listed below:  Current Antihypertensives  , Daily, Oral  , Daily, Oral  losartan tablet 50 mg, Daily, Oral  metoprolol tartrate (LOPRESSOR) tablet 25 mg, 2 times daily, Oral  furosemide injection 20 mg, Daily, Intravenous  lisinopriL tablet 20 mg, Daily, Oral  hydroCHLOROthiazide tablet 25 mg, Daily, Oral    Plan  - BP is controlled, no changes needed to their regimen

## 2024-12-29 NOTE — CARE UPDATE
12/28/24 1945   Patient Assessment/Suction   Level of Consciousness (AVPU) alert   Respiratory Effort Mild   Expansion/Accessory Muscles/Retractions expansion symmetric   All Lung Fields Breath Sounds coarse;wheezes, expiratory   LLL Breath Sounds crackles, coarse   RML Breath Sounds crackles, coarse   RLL Breath Sounds crackles, coarse   Rhythm/Pattern, Respiratory assisted mechanically   Cough Frequency no cough   Skin Integrity   $ Wound Care Tech Time 15 min   Area Observed Bridge of nose   Skin Appearance without discoloration   Barrier used? Liquid Filled Cushion   PRE-TX-O2   Device (Oxygen Therapy) BIPAP   $ Is the patient on High Flow Oxygen? Yes   Oxygen Concentration (%) 50   SpO2 (!) 90 %   Pulse Oximetry Type Continuous   $ Pulse Oximetry - Multiple Charge Pulse Oximetry - Multiple   Pulse 80   Positioning HOB elevated 45 degrees   Ready to Wean/Extubation Screen   FIO2<=50 (chart decimal) 0.5   Preset CPAP/BiPAP Settings   Mode Of Delivery BiPAP S/T   $ CPAP/BiPAP Daily Charge 1   CPAP/BIPAP charged w/in last 24 h YES   $ Initial CPAP/BiPAP Setup? Yes   $ Is patient using? Yes   Sized Appropriately? Yes   Equipment Type V60   Airway Device Type medium full face mask   Humidifier not applicable   Ipap 14   EPAP (cm H2O) 7   Pressure Support (cm H2O) 7   Set Rate (Breaths/Min) 10   ITime (sec) 1   Rise Time (sec) 3   Patient CPAP/BiPAP Settings   Timed Inspiration (Sec) 1   IPAP Rise Time (sec) 3   RR Total (Breaths/Min) 26   Tidal Volume (mL) 423   VE Minute Ventilation (L/min) 11 L/min   Peak Inspiratory Pressure (cm H2O) 14   TiTOT (%) 29   Total Leak (L/Min) 0   Patient Trigger - ST Mode Only (%) 89   CPAP/BiPAP Alarms   High Pressure (cm H2O) 20   Low Pressure (cm H2O) 5   Minute Ventilation (L/Min) 3   High RR (breaths/min) 40   Low RR (breaths/min) 8   Apnea (Sec) 20

## 2024-12-29 NOTE — ASSESSMENT & PLAN NOTE
Patient with Hypercapnic and Hypoxic Respiratory failure which is Acute on chronic.  he is on home oxygen at 1.5 LPM. Supplemental oxygen was provided and noted- Oxygen Concentration (%):  [50] 50    .   Signs/symptoms of respiratory failure include- increased work of breathing and respiratory distress. Contributing diagnoses includes - CHF and COPD Labs and images were reviewed. Patient Has recent ABG, which has been reviewed. Will treat underlying causes and adjust management of respiratory failure as follows-     Continue BiPAP - wean per guidelines  Trend blood gases

## 2024-12-29 NOTE — ASSESSMENT & PLAN NOTE
Patient presented with respiratory failure, edema in lower extremities.  Urinary output of 2.9L after 40mg lasix with improvement of dyspnea. Currently has minimal fluid in lower extremities.  Suspect exacerbation of heart failure in the setting of cellulitis.      Getting lasix . Will continue with IV 40 mg daily   Continue GDMT  Monitor urine output  Telemetry  Soon downgrade  Repeat ECHO pending   Cardio MD cx if needed

## 2024-12-29 NOTE — PLAN OF CARE
Critical access hospital  Initial Discharge Assessment       Primary Care Provider: Juancho Ortiz MD    Admission Diagnosis: Acute respiratory failure with hypoxia and hypercapnia [J96.01, J96.02]    Admission Date: 12/28/2024  Expected Discharge Date: 1/1/2025  Met with patient's spouse at [facility] to complete assessment. No POA, living will or advance directive. No HH, HD or Coumadin. See DME. Patient's wife is requesting a shower chair because his tub is small and hard to get into. Patient's wife stated that they live in a trailer on her family's property. Patient's wife stated that the trailer is small and cannot afford alternate housing.  provided patient's wife with a list of rental and housing assistance programs. Patient's wife said that she has looked into HUD and [employer] for assistance. Patient appears to have no other identified needs at this time.    Transition of Care Barriers: None    Payor: MEDICAID / Plan: HEALTHY BLUE (GATHER & SAVE) / Product Type: Managed Medicaid /     Extended Emergency Contact Information  Primary Emergency Contact: Estephanie Jimenez  Address: [id number] [place] [address] of [facility]  Mobile Phone: [phone]  Relation: Spouse    Discharge Plan A: Home with family  Discharge Plan B: Home with family      [facility] [address] [facility] [address]  [address] [facility] [address]  [facility] [address]  Phone: [phone] Fax: [phone]    [facility] - [place], [place] - [address] [facility] [address]  [address] [facility] [address]  [place] [address]  Phone: [phone] Fax: [phone]    [facility] [address] [address]  Phone: [phone] Fax: [phone]      Initial Assessment (most recent)       Adult Discharge Assessment - 12/29/24 1013          Discharge Assessment    Assessment Type Discharge Planning Assessment     Confirmed/corrected address, phone number and insurance Yes     Confirmed Demographics Correct  on Facesheet     Source of Information family     When was your last doctors appointment? --   three months ago    Communicated TANIA with patient/caregiver Yes     Reason For Admission failure with hypoxia and hypercapnia     People in Home spouse     Facility Arrived From: home     Do you expect to return to your current living situation? Yes     Do you have help at home or someone to help you manage your care at home? Yes     Who are your caregiver(s) and their phone number(s)? Estephanie Tony/spouse [phone]     Prior to hospitilization cognitive status: Alert/Oriented     Current cognitive status: Alert/Oriented     Walking or Climbing Stairs Difficulty yes     Walking or Climbing Stairs ambulation difficulty, requires equipment;stair climbing difficulty, dependent     Mobility Management straight cane     Dressing/Bathing Difficulty yes     Dressing/Bathing bathing difficulty, assistance 1 person     Dressing/Bathing Management wife assists     Equipment Currently Used at Home cane, straight;nebulizer;oxygen   1.5 liers ofO2 supplied by [facility]    Patient currently being followed by outpatient case management? No     Do you currently have service(s) that help you manage your care at home? No     Do you take prescription medications? Yes     Do you have prescription coverage? Yes     Coverage Healthy Blue Medicaid     Do you have any problems affording any of your prescribed medications? No     Who is going to help you get home at discharge? Estephanie Tony/spouse [phone]     How do you get to doctors appointments? family or friend will provide     Are you on dialysis? No     Do you take coumadin? No     Discharge Plan A Home with family     Discharge Plan B Home with family     DME Needed Upon Discharge  shower chair     Discharge Plan discussed with: Spouse/sig other     Name(s) and Number(s) Estephanie Jimenez/spouse [phone]     Transition of Care Barriers None

## 2024-12-29 NOTE — PROGRESS NOTES
Automatic Inhaler to Nebulizer Interchange    fluticasone/vilanterol (Breo Ellipta) 200 mcg/25 mcg changed to budesonide 1 mg twice daily AND arformoterol 15 mcg twice daily per [facility] Automatic Therapeutic Substitutions Protocol.    Please contact pharmacy at extension [phone] with any questions.     Thank you,   Shasta Scales

## 2024-12-29 NOTE — PROCEDURES
"Renard Jimenez is a 59 y.o. male patient.    Temp: 97.8 °F (36.6 °C) (24 1200)  Pulse: 76 (24 1413)  Resp: 20 (24 1413)  BP: (!) 149/77 (24 1400)  SpO2: (!) 90 % (24 1413)  Weight: (!) 140.9 kg (310 lb 10.1 oz) (24)  Height: 5' 9.5" (176.5 cm) (24)       Incision and Drainage with removal of Right plantar foot Foreign body    Date/Time: 2024 1:37 PM  Location procedure was performed: [facility] NON-INVASIVE CARDIOLOGY    Performed by: Darci Oreilly DPM  Authorized by: Darci Oreilly DPM  Pre-operative diagnosis: right foot cellulitis with foreign body  Post-operative diagnosis: right foot cellutlits 2/2 to puncture wound, foreign body removed  Consent Done: Yes  Consent: Verbal consent obtained. Written consent obtained.  Risks and benefits: risks, benefits and alternatives were discussed  Consent given by: patient  Patient understanding: patient states understanding of the procedure being performed  Patient consent: the patient's understanding of the procedure matches consent given  Procedure consent: procedure consent matches procedure scheduled  Relevant documents: relevant documents present and verified  Test results: test results available and properly labeled  Site marked: the operative site was marked  Imaging studies: imaging studies available  Patient identity confirmed: verbally with patient, name and   Time out: Immediately prior to procedure a "time out" was called to verify the correct patient, procedure, equipment, support staff and site/side marked as required.  Indications for incision and drainage: Suspected abscess, cellutltis, and foreign body.  Body area: lower extremity  Location details: right foot  Anesthesia: local infiltration    Anesthesia:  Local Anesthetic: lidocaine 2% without epinephrine and bupivacaine 0.5% without epinephrine  Anesthetic total: 10 (1:1) mL    Patient sedated: no  Description of findings: No purulence or " drainable pocket, metallic foreign body about 6-7 mm deep removed   Scalpel size: 15  Incision type: single straight  Incision depth: subcutaneous  Complexity: complex  Drainage: bloody  Drainage amount: moderate  Wound treatment: incision, drainage and expression of material (removal of foreign body)  Packing material: none  Complications: No  Estimated blood loss (mL): 5  Specimens: Yes (Deep culture swab and foreign body sent to micro and path respectively)  Implants: No  Patient tolerance: Patient tolerated the procedure well with no immediate complications  Comments: No signs of deep space infection, after copious 20 mL betadine followed by 1 liter saline irrigation, primary closure, 3 sutures.     Incision depth: subcutaneous            Darci Oreilly DPM  Podiatry / Foot and Ankle Surgery   Secure chat preferred       12/29/2024

## 2024-12-29 NOTE — SUBJECTIVE & OBJECTIVE
Interval History:     Seen and examined with RN and respiratory therapy   BIPAP was able to wean and on NC 6 L . UO  4 L around , very bloated . Labs stable . ABG better. Later started diet .  Podiatry MD saw .        Review of Systems  Objective:     Vital Signs (Most Recent):  Temp: 97.8 °F (36.6 °C) (12/29/24 1200)  Pulse: 71 (12/29/24 1300)  Resp: 18 (12/29/24 1300)  BP: 136/65 (12/29/24 1300)  SpO2: (!) 93 % (12/29/24 1300) Vital Signs (24h Range):  Temp:  [97.2 °F (36.2 °C)-97.9 °F (36.6 °C)] 97.8 °F (36.6 °C)  Pulse:  [69-97] 71  Resp:  [13-27] 18  SpO2:  [86 %-98 %] 93 %  BP: (115-194)/() 136/65     Weight: (!) 140.9 kg (310 lb 10.1 oz)  Body mass index is 45.21 kg/m².    Intake/Output Summary (Last 24 hours) at 12/29/2024 1400  Last data filed at 12/29/2024 1351  Gross per 24 hour   Intake 1331.58 ml   Output 6200 ml   Net -4868.42 ml         Physical Exam  Constitutional:       General: He is not in acute distress.     Appearance: He is well-developed.   HENT:      Head: Normocephalic.   Eyes:      Pupils: Pupils are equal, round, and reactive to light.   Cardiovascular:      Rate and Rhythm: Normal rate and regular rhythm.   Pulmonary:      Effort: Pulmonary effort is normal. No respiratory distress.      Breath sounds: Rales present.   Abdominal:      General: Abdomen is flat. There is no distension.      Tenderness: There is no abdominal tenderness.   Musculoskeletal:         General: Normal range of motion.      Cervical back: Neck supple.   Skin:     General: Skin is warm.      Findings: No rash.   Neurological:      Mental Status: He is alert and oriented to person, place, and time.   Psychiatric:         Mood and Affect: Mood normal.             Significant Labs: All pertinent labs within the past 24 hours have been reviewed.  CBC:   Recent Labs   Lab 12/28/24  1805 12/29/24  0515   WBC 8.87  --    HGB 15.9  --    HCT 51.2 51     --      CMP:   Recent Labs   Lab 12/28/24  1805   *    K 5.1   CL 93*   CO2 32*      BUN 12   CREATININE 0.8   CALCIUM 9.1   PROT 7.3   ALBUMIN 3.6   BILITOT 1.4*   ALKPHOS 88   AST 26   ALT 40   ANIONGAP 7*     Cardiac Markers:   Recent Labs   Lab 12/28/24  1805   *       Significant Imaging: I have reviewed all pertinent imaging results/findings within the past 24 hours.

## 2024-12-29 NOTE — ASSESSMENT & PLAN NOTE
Right foot SSTI 2/2 puncture wound and retained foreign body see on right foot xray. Soft tissue US negative for abscess. No STG on xray.     Patient can have diet, informed primary team  Discussed removal of foreign body bedside vs in the OR setting including benefits, risks, and potential complications. I recommended bedside with local infiltration, patient was in agreement and amenable.   Lidocaine order   Informed consent for Right plantar foot I&D and foreign body removal obtained; see procedure brief   Foreign body was removed without complication, no findings to contraindicate primary closure after copious 1 L saline irrigation.   Deep wound swab was obtained and sent for culture   Metallic needle tip looking object was sent to pathology, see media  Foot was dressed with xeroform, 4x4s, kerlix, and ace  Repeat Right foot xray ordered to confirm complete removal of foreign body.   Recommend at least two week empiric ABx coverage for puncture wound, tetanus prophylaxis done in ED last night. Pending cultures Abx can be optimized.   WB: Right foot minimal Partial WBAT with post procedure shoe and dressing intact. Patient should keep as much pressure off the forefoot as possible and cheat the pressure to his heel for short distance, 5-10 feet, and transfer, otherwise keep elevated above the level of the heart while in bed or bedside recliner.   Patient will need to follow up in podiatry clinic in about 2.5 weeks for removal of sutures

## 2024-12-29 NOTE — HPI
59 year old male with comorbid conditions of COPD on 1.5 lpm home oxygen, HFpEF, HLD, hypertension, h/o MI presented to [facility] due to shortness of breath.  Per EMS was saturating 64% on 1.5lpm nasal cannula, was placed on additional oxygen and brought to ED.  Per wife patient was also complaining of right foot pain after stepping on a nail 1 week prior.  Denies chest pain, fevers, nausea/vomiting, diarrhea.   On evaluation patient has crackles bilaterally, edema in extremities. BNP is 676. CXR revealed pulmonary vascular congestion.  Was given 40mg IV lasix with over 2 liter output.  Right foot noted to be erythematous with purulent drainage, U/S extremity did not reveal fluid collection.  X-ray foot revealed 1.0cm metallic foreign body within plantar soft tissue.  Started on vancomycin and zosyn.  Podiatry notified.  Patient was started on nicardipine infusion for blood pressure reaching > .    Blood gas revealed hypercapnia for which patient was started on biPAP.  Also given IV solumedrol, magnesium. Patient was transferred to [facility] for continuous bipap requirement.

## 2024-12-29 NOTE — ED NOTES
Assumed care:  Renard Jimenez is awake, alert and oriented x 3, skin warm and dry, in NAD with family at bedside.  Patient CO SOB for the past few days.  States he was taken  off of his lasix a few weeks ago.  Patient is on home O2 at 1.5 LPM.  When wife came home today, he was hallucinating.  Per EMS, sats were 64% on 1.5 L.Patient was placed on non rebreather and sats 89%.  Patient also has wound to RLE.    Patient identifiers for Renard Jimenez checked and correct.  LOC:  Renard Jimenez is awake, alert, and aware of environment with an appropriate affect. He is oriented x 3 and speaking appropriately.  APPEARANCE:  He is resting comfortably and in no acute distress. He is clean and well groomed, patient's clothing is properly fastened.  SKIN:  The skin is warm and dry. He has normal skin turgor and moist mucus membranes. Skin is intact; no bruising or breakdown noted.  MUSCULOSKELETAL:  He is moving all extremities well, no obvious deformities noted. Pulses intact.   RESPIRATORY:  Airway is open and patent. Respirations are spontaneous and non-labored with normal effort and rate.  SOB  CARDIAC:  He has a normal rate and rhythm. 2+ peripheral edema noted. Capillary refill < 3 seconds.  ABDOMEN:  No distention noted.  Soft and non-tender upon palpation.  NEUROLOGICAL:  PERRL. Facial expression is symmetrical. Hand grasps are equal bilaterally. Normal sensation in all extremities when touched with finger.  Allergies reported:    Review of patient's allergies indicates:   Allergen Reactions    Dog dander Hives    House dust Hives    Maple flavor Other (See Comments)     Throat swelling      Pollen extracts Hives

## 2024-12-29 NOTE — CARE UPDATE
12/29/24 0719   Patient Assessment/Suction   Level of Consciousness (AVPU) responds to voice   Respiratory Effort Unlabored   All Lung Fields Breath Sounds diminished   PRE-TX-O2   Device (Oxygen Therapy) BIPAP   $ Is the patient on High Flow Oxygen? Yes   Oxygen Concentration (%) 50   SpO2 96 %   Pulse Oximetry Type Continuous   $ Pulse Oximetry - Multiple Charge Pulse Oximetry - Multiple   Pulse 88   Resp 15   Aerosol Therapy   $ Aerosol Therapy Charges Aerosol Treatment  (brovana)   Daily Review of Necessity (SVN) completed   Respiratory Treatment Status (SVN) given   Treatment Route (SVN) mask;in-line   Patient Position HOB elevated   Post Treatment Assessment (SVN) breath sounds unchanged;vital signs unchanged   Signs of Intolerance (SVN) none   Preset CPAP/BiPAP Settings   Mode Of Delivery BiPAP S/T   $ CPAP/BiPAP Daily Charge 1   CPAP/BIPAP charged w/in last 24 h YES   $ Initial CPAP/BiPAP Setup? No   $ Is patient using? Yes   Size of Mask Medium/Large   Sized Appropriately? Yes   Equipment Type V60   Airway Device Type medium full face mask   Humidifier not applicable   Ipap 14   EPAP (cm H2O) 7   Pressure Support (cm H2O) 7   Set Rate (Breaths/Min) 10   ITime (sec) 1   Rise Time (sec) 3   Patient CPAP/BiPAP Settings   IPAP Rise Time (sec) 3   RR Total (Breaths/Min) 18   Tidal Volume (mL) 849   VE Minute Ventilation (L/min) 16 L/min   Peak Inspiratory Pressure (cm H2O) 15   TiTOT (%) 42   Total Leak (L/Min) 10   Patient Trigger - ST Mode Only (%) 97   CPAP/BiPAP Backup Settings   IPAP Backup 14 cmH2O   EPAP Backup 7 cmH2O   Backup Rate 10 breaths per minute (bpm)   FIO2 Backup 50 %   ITIME Backup 1 seconds   Rise Time Backup 3 seconds   CPAP/BiPAP Alarms   High Pressure (cm H2O) 40   Low Pressure (cm H2O) 5   Minute Ventilation (L/Min) 3   High RR (breaths/min) 45   Low RR (breaths/min) 8   Apnea (Sec) 20

## 2024-12-30 PROBLEM — E66.01 MORBID OBESITY: Status: ACTIVE | Noted: 2024-12-30

## 2024-12-30 PROBLEM — E87.1 HYPONATREMIA: Status: ACTIVE | Noted: 2024-12-30

## 2024-12-30 PROBLEM — I50.33 ACUTE ON CHRONIC HEART FAILURE WITH PRESERVED EJECTION FRACTION: Status: ACTIVE | Noted: 2022-11-09

## 2024-12-30 LAB
ALLENS TEST: ABNORMAL
ALLENS TEST: ABNORMAL
ANION GAP SERPL CALC-SCNC: 5 MMOL/L (ref 8–16)
AV INDEX (PROSTH): 0.58
AV MEAN GRADIENT: 12 MMHG
AV PEAK GRADIENT: 21.2 MMHG
AV VALVE AREA BY VELOCITY RATIO: 2.2 CM²
AV VALVE AREA: 2.4 CM²
AV VELOCITY RATIO: 0.52
BASOPHILS # BLD AUTO: 0.02 K/UL (ref 0–0.2)
BASOPHILS NFR BLD: 0.2 % (ref 0–1.9)
BSA FOR ECHO PROCEDURE: 2.63 M2
BUN SERPL-MCNC: 16 MG/DL (ref 6–20)
CALCIUM SERPL-MCNC: 8.8 MG/DL (ref 8.7–10.5)
CHLORIDE SERPL-SCNC: 91 MMOL/L (ref 95–110)
CO2 SERPL-SCNC: 42 MMOL/L (ref 23–29)
CREAT SERPL-MCNC: 1 MG/DL (ref 0.5–1.4)
CV ECHO LV RWT: 0.61 CM
DELSYS: ABNORMAL
DELSYS: ABNORMAL
DIFFERENTIAL METHOD BLD: ABNORMAL
DOP CALC AO PEAK VEL: 2.3 M/S
DOP CALC AO VTI: 45.9 CM
DOP CALC LVOT AREA: 4.2 CM2
DOP CALC LVOT DIAMETER: 2.3 CM
DOP CALC LVOT PEAK VEL: 1.2 M/S
DOP CALC LVOT STROKE VOLUME: 110 CM3
DOP CALC MV VTI: 29.3 CM
DOP CALCLVOT PEAK VEL VTI: 26.5 CM
E WAVE DECELERATION TIME: 222 MSEC
E/A RATIO: 1
E/E' RATIO: 9.75 M/S
ECHO LV POSTERIOR WALL: 1.4 CM (ref 0.6–1.1)
EOSINOPHIL # BLD AUTO: 0 K/UL (ref 0–0.5)
EOSINOPHIL NFR BLD: 0 % (ref 0–8)
EP: 7
ERYTHROCYTE [DISTWIDTH] IN BLOOD BY AUTOMATED COUNT: 14.2 % (ref 11.5–14.5)
ERYTHROCYTE [SEDIMENTATION RATE] IN BLOOD BY WESTERGREN METHOD: 0 MM/H
EST. GFR  (NO RACE VARIABLE): >60 ML/MIN/1.73 M^2
FIO2: 40
FLOW: 5
FRACTIONAL SHORTENING: 34.8 % (ref 28–44)
GLUCOSE SERPL-MCNC: 97 MG/DL (ref 70–110)
HCO3 UR-SCNC: 45.2 MMOL/L (ref 24–28)
HCO3 UR-SCNC: 47.7 MMOL/L (ref 24–28)
HCT VFR BLD AUTO: 51.2 % (ref 40–54)
HGB BLD-MCNC: 15.7 G/DL (ref 14–18)
IMM GRANULOCYTES # BLD AUTO: 0.02 K/UL (ref 0–0.04)
IMM GRANULOCYTES NFR BLD AUTO: 0.2 % (ref 0–0.5)
INTERVENTRICULAR SEPTUM: 1.5 CM (ref 0.6–1.1)
IP: 12
IVC DIAMETER: 2.5 CM
LEFT INTERNAL DIMENSION IN SYSTOLE: 3 CM (ref 2.1–4)
LEFT VENTRICLE DIASTOLIC VOLUME INDEX: 38.94 ML/M2
LEFT VENTRICLE DIASTOLIC VOLUME: 97.34 ML
LEFT VENTRICLE MASS INDEX: 108.2 G/M2
LEFT VENTRICLE SYSTOLIC VOLUME INDEX: 14 ML/M2
LEFT VENTRICLE SYSTOLIC VOLUME: 35 ML
LEFT VENTRICULAR INTERNAL DIMENSION IN DIASTOLE: 4.6 CM (ref 3.5–6)
LEFT VENTRICULAR MASS: 270.6 G
LV LATERAL E/E' RATIO: 7.09 M/S
LV SEPTAL E/E' RATIO: 15.6 M/S
LVED V (TEICH): 97.34 ML
LVES V (TEICH): 35 ML
LVOT MG: 3 MMHG
LVOT MV: 0.83 CM/S
LYMPHOCYTES # BLD AUTO: 1.3 K/UL (ref 1–4.8)
LYMPHOCYTES NFR BLD: 15.9 % (ref 18–48)
MAGNESIUM SERPL-MCNC: 1.8 MG/DL (ref 1.6–2.6)
MCH RBC QN AUTO: 32 PG (ref 27–31)
MCHC RBC AUTO-ENTMCNC: 30.7 G/DL (ref 32–36)
MCV RBC AUTO: 104 FL (ref 82–98)
MODE: ABNORMAL
MODE: ABNORMAL
MONOCYTES # BLD AUTO: 0.7 K/UL (ref 0.3–1)
MONOCYTES NFR BLD: 8.2 % (ref 4–15)
MV MEAN GRADIENT: 2 MMHG
MV PEAK A VEL: 0.78 M/S
MV PEAK E VEL: 0.78 M/S
MV PEAK GRADIENT: 4 MMHG
MV STENOSIS PRESSURE HALF TIME: 53 MS
MV VALVE AREA BY CONTINUITY EQUATION: 3.76 CM2
MV VALVE AREA P 1/2 METHOD: 4.15 CM2
NEUTROPHILS # BLD AUTO: 6.3 K/UL (ref 1.8–7.7)
NEUTROPHILS NFR BLD: 75.5 % (ref 38–73)
NRBC BLD-RTO: 0 /100 WBC
OHS CV RV/LV RATIO: 0.93 CM
PCO2 BLDA: 83.9 MMHG (ref 35–45)
PCO2 BLDA: 95.9 MMHG (ref 35–45)
PH SMN: 7.28 [PH] (ref 7.35–7.45)
PH SMN: 7.36 [PH] (ref 7.35–7.45)
PHOSPHATE SERPL-MCNC: 4.5 MG/DL (ref 2.7–4.5)
PISA TR MAX VEL: 3.29 M/S
PLATELET # BLD AUTO: 234 K/UL (ref 150–450)
PMV BLD AUTO: 10.3 FL (ref 9.2–12.9)
PO2 BLDA: 44 MMHG (ref 40–60)
PO2 BLDA: 79 MMHG (ref 80–100)
POC BE: 18 MMOL/L
POC BE: 22 MMOL/L
POC SATURATED O2: 69 % (ref 95–100)
POC SATURATED O2: 94 % (ref 95–100)
POC TCO2: 48 MMOL/L (ref 24–29)
POC TCO2: >50 MMOL/L (ref 23–27)
POTASSIUM SERPL-SCNC: 4.2 MMOL/L (ref 3.5–5.1)
PV MV: 0.66 M/S
PV PEAK GRADIENT: 3 MMHG
PV PEAK VELOCITY: 0.93 M/S
RBC # BLD AUTO: 4.91 M/UL (ref 4.6–6.2)
RIGHT VENTRICLE DIASTOLIC BASEL DIMENSION: 4.3 CM
RIGHT VENTRICULAR END-DIASTOLIC DIMENSION: 4.3 CM
RV TISSUE DOPPLER FREE WALL SYSTOLIC VELOCITY 1 (APICAL 4 CHAMBER VIEW): 11.3 CM/S
SAMPLE: ABNORMAL
SAMPLE: ABNORMAL
SINUS: 3.2 CM
SITE: ABNORMAL
SITE: ABNORMAL
SODIUM SERPL-SCNC: 138 MMOL/L (ref 136–145)
TDI LATERAL: 0.11 M/S
TDI SEPTAL: 0.05 M/S
TDI: 0.08 M/S
TR MAX PG: 43 MMHG
TRICUSPID ANNULAR PLANE SYSTOLIC EXCURSION: 2.14 CM
VANCOMYCIN TROUGH SERPL-MCNC: 17.2 UG/ML
WBC # BLD AUTO: 8.41 K/UL (ref 3.9–12.7)
Z-SCORE OF LEFT VENTRICULAR DIMENSION IN END DIASTOLE: -11.08
Z-SCORE OF LEFT VENTRICULAR DIMENSION IN END SYSTOLE: -8.01

## 2024-12-30 PROCEDURE — 99900031 HC PATIENT EDUCATION (STAT)

## 2024-12-30 PROCEDURE — 27000207 HC ISOLATION

## 2024-12-30 PROCEDURE — 25000003 PHARM REV CODE 250: Performed by: STUDENT IN AN ORGANIZED HEALTH CARE EDUCATION/TRAINING PROGRAM

## 2024-12-30 PROCEDURE — 63600175 PHARM REV CODE 636 W HCPCS: Performed by: INTERNAL MEDICINE

## 2024-12-30 PROCEDURE — 25000003 PHARM REV CODE 250: Performed by: HOSPITALIST

## 2024-12-30 PROCEDURE — 20000000 HC ICU ROOM

## 2024-12-30 PROCEDURE — 82803 BLOOD GASES ANY COMBINATION: CPT

## 2024-12-30 PROCEDURE — 94640 AIRWAY INHALATION TREATMENT: CPT

## 2024-12-30 PROCEDURE — 83735 ASSAY OF MAGNESIUM: CPT | Performed by: STUDENT IN AN ORGANIZED HEALTH CARE EDUCATION/TRAINING PROGRAM

## 2024-12-30 PROCEDURE — 27000221 HC OXYGEN, UP TO 24 HOURS

## 2024-12-30 PROCEDURE — 94660 CPAP INITIATION&MGMT: CPT

## 2024-12-30 PROCEDURE — 63600175 PHARM REV CODE 636 W HCPCS: Performed by: HOSPITALIST

## 2024-12-30 PROCEDURE — 94761 N-INVAS EAR/PLS OXIMETRY MLT: CPT

## 2024-12-30 PROCEDURE — 36600 WITHDRAWAL OF ARTERIAL BLOOD: CPT

## 2024-12-30 PROCEDURE — 99900035 HC TECH TIME PER 15 MIN (STAT)

## 2024-12-30 PROCEDURE — 84100 ASSAY OF PHOSPHORUS: CPT | Performed by: STUDENT IN AN ORGANIZED HEALTH CARE EDUCATION/TRAINING PROGRAM

## 2024-12-30 PROCEDURE — 80202 ASSAY OF VANCOMYCIN: CPT | Performed by: STUDENT IN AN ORGANIZED HEALTH CARE EDUCATION/TRAINING PROGRAM

## 2024-12-30 PROCEDURE — 25000242 PHARM REV CODE 250 ALT 637 W/ HCPCS: Performed by: STUDENT IN AN ORGANIZED HEALTH CARE EDUCATION/TRAINING PROGRAM

## 2024-12-30 PROCEDURE — 63600175 PHARM REV CODE 636 W HCPCS: Performed by: STUDENT IN AN ORGANIZED HEALTH CARE EDUCATION/TRAINING PROGRAM

## 2024-12-30 PROCEDURE — 80048 BASIC METABOLIC PNL TOTAL CA: CPT | Performed by: STUDENT IN AN ORGANIZED HEALTH CARE EDUCATION/TRAINING PROGRAM

## 2024-12-30 PROCEDURE — 85025 COMPLETE CBC W/AUTO DIFF WBC: CPT | Performed by: INTERNAL MEDICINE

## 2024-12-30 RX ORDER — ACETAMINOPHEN 325 MG/1
650 TABLET ORAL EVERY 6 HOURS PRN
Status: DISCONTINUED | OUTPATIENT
Start: 2024-12-30 | End: 2025-01-02 | Stop reason: HOSPADM

## 2024-12-30 RX ORDER — TRAMADOL HYDROCHLORIDE 50 MG/1
50 TABLET ORAL EVERY 6 HOURS PRN
Status: DISCONTINUED | OUTPATIENT
Start: 2024-12-31 | End: 2025-01-02 | Stop reason: HOSPADM

## 2024-12-30 RX ADMIN — IPRATROPIUM BROMIDE AND ALBUTEROL SULFATE 3 ML: .5; 3 SOLUTION RESPIRATORY (INHALATION) at 07:12

## 2024-12-30 RX ADMIN — KETOROLAC TROMETHAMINE 15 MG: 30 INJECTION, SOLUTION INTRAMUSCULAR; INTRAVENOUS at 04:12

## 2024-12-30 RX ADMIN — ARFORMOTEROL TARTRATE 15 MCG: 15 SOLUTION RESPIRATORY (INHALATION) at 06:12

## 2024-12-30 RX ADMIN — ENOXAPARIN SODIUM 40 MG: 40 INJECTION SUBCUTANEOUS at 08:12

## 2024-12-30 RX ADMIN — HYDROCHLOROTHIAZIDE 25 MG: 25 TABLET ORAL at 08:12

## 2024-12-30 RX ADMIN — MUPIROCIN 1 G: 20 OINTMENT TOPICAL at 09:12

## 2024-12-30 RX ADMIN — LOSARTAN POTASSIUM 50 MG: 50 TABLET, FILM COATED ORAL at 08:12

## 2024-12-30 RX ADMIN — PIPERACILLIN SODIUM AND TAZOBACTAM SODIUM 3.38 G: 3; .375 INJECTION, POWDER, FOR SOLUTION INTRAVENOUS at 02:12

## 2024-12-30 RX ADMIN — GABAPENTIN 300 MG: 300 CAPSULE ORAL at 08:12

## 2024-12-30 RX ADMIN — IPRATROPIUM BROMIDE AND ALBUTEROL SULFATE 3 ML: .5; 3 SOLUTION RESPIRATORY (INHALATION) at 06:12

## 2024-12-30 RX ADMIN — METOPROLOL TARTRATE 25 MG: 25 TABLET, FILM COATED ORAL at 08:12

## 2024-12-30 RX ADMIN — MONTELUKAST 10 MG: 10 TABLET, FILM COATED ORAL at 08:12

## 2024-12-30 RX ADMIN — TRAMADOL HYDROCHLORIDE 50 MG: 50 TABLET, COATED ORAL at 11:12

## 2024-12-30 RX ADMIN — ARFORMOTEROL TARTRATE 15 MCG: 15 SOLUTION RESPIRATORY (INHALATION) at 07:12

## 2024-12-30 RX ADMIN — BUDESONIDE INHALATION 1 MG: 0.5 SUSPENSION RESPIRATORY (INHALATION) at 06:12

## 2024-12-30 RX ADMIN — BUDESONIDE INHALATION 1 MG: 0.5 SUSPENSION RESPIRATORY (INHALATION) at 07:12

## 2024-12-30 RX ADMIN — ACETAMINOPHEN 650 MG: 325 TABLET ORAL at 05:12

## 2024-12-30 RX ADMIN — IPRATROPIUM BROMIDE AND ALBUTEROL SULFATE 3 ML: .5; 3 SOLUTION RESPIRATORY (INHALATION) at 01:12

## 2024-12-30 RX ADMIN — VANCOMYCIN HYDROCHLORIDE 2000 MG: 500 INJECTION, POWDER, LYOPHILIZED, FOR SOLUTION INTRAVENOUS at 08:12

## 2024-12-30 RX ADMIN — PANTOPRAZOLE SODIUM 40 MG: 40 TABLET, DELAYED RELEASE ORAL at 05:12

## 2024-12-30 RX ADMIN — MUPIROCIN 1 G: 20 OINTMENT TOPICAL at 08:12

## 2024-12-30 RX ADMIN — FUROSEMIDE 40 MG: 10 INJECTION, SOLUTION INTRAMUSCULAR; INTRAVENOUS at 08:12

## 2024-12-30 RX ADMIN — LISINOPRIL 20 MG: 20 TABLET ORAL at 08:12

## 2024-12-30 RX ADMIN — VANCOMYCIN HYDROCHLORIDE 1500 MG: 1.5 INJECTION, POWDER, LYOPHILIZED, FOR SOLUTION INTRAVENOUS at 09:12

## 2024-12-30 RX ADMIN — BUPROPION HYDROCHLORIDE 150 MG: 150 TABLET, EXTENDED RELEASE ORAL at 08:12

## 2024-12-30 NOTE — CARE UPDATE
12/30/24 0946   Patient Assessment/Suction   Level of Consciousness (AVPU) alert   Respiratory Effort Normal;Unlabored   PRE-TX-O2   Device (Oxygen Therapy) BIPAP   Oxygen Concentration (%) 50   SpO2 (!) 92 %   Pulse Oximetry Type Continuous   Pulse 74   Preset CPAP/BiPAP Settings   Mode Of Delivery BiPAP S/T   CPAP/BIPAP charged w/in last 24 h YES   $ Initial CPAP/BiPAP Setup? No   $ Is patient using? Yes   Size of Mask Medium/Large   Sized Appropriately? Yes   Equipment Type V60   Airway Device Type medium full face mask   Humidifier not applicable   CPAP (cm H2O) 14   Ipap 7   Set Rate (Breaths/Min) 10   ITime (sec) 1   Rise Time (sec) 3   Patient CPAP/BiPAP Settings   Timed Inspiration (Sec) 1   IPAP Rise Time (sec) 3   RR Total (Breaths/Min) 22   Tidal Volume (mL) 372   VE Minute Ventilation (L/min) 8.2 L/min   Peak Inspiratory Pressure (cm H2O) 14   TiTOT (%) 22   Total Leak (L/Min) 10   Patient Trigger - ST Mode Only (%) 100   CPAP/BiPAP Alarms   High Pressure (cm H2O) 40   Low Pressure (cm H2O) 5   Minute Ventilation (L/Min) 3   High RR (breaths/min) 45   Low RR (breaths/min) 8   Apnea (Sec) 20   Education   $ Education 15 min;BiPAP     Patient agreed to wear Bipap until lunch time.

## 2024-12-30 NOTE — CARE UPDATE
12/30/24 0640   Patient Assessment/Suction   Level of Consciousness (AVPU) alert   Respiratory Effort Normal;Unlabored   Expansion/Accessory Muscles/Retractions no use of accessory muscles;no retractions   All Lung Fields Breath Sounds clear;diminished   Rhythm/Pattern, Respiratory depth regular;pattern regular   Cough Frequency infrequent   Cough Type no productive sputum   PRE-TX-O2   Device (Oxygen Therapy) room air  (nasal cannula [id number] pt nose)   $ Is the patient on Low Flow Oxygen? Yes   Flow (L/min) (Oxygen Therapy) 5   SpO2 (!) 90 %   Pulse Oximetry Type Continuous   $ Pulse Oximetry - Multiple Charge Pulse Oximetry - Multiple   Pulse 81   Resp 18   Aerosol Therapy   $ Aerosol Therapy Charges Aerosol Treatment  (duoneb)   Daily Review of Necessity (SVN) completed   Respiratory Treatment Status (SVN) given   Treatment Route (SVN) mask;oxygen   Patient Position HOB elevated   Post Treatment Assessment (SVN) breath sounds unchanged   Signs of Intolerance (SVN) none   Breath Sounds Post-Respiratory Treatment   Throughout All Fields Post-Treatment All Fields   Throughout All Fields Post-Treatment no change   Post-treatment Heart Rate (beats/min) 82   Post-treatment Resp Rate (breaths/min) 18   Preset CPAP/BiPAP Settings   Mode Of Delivery standby   $ CPAP/BiPAP Daily Charge 1   CPAP/BIPAP charged w/in last 24 h YES   $ Initial CPAP/BiPAP Setup? No   $ Is patient using? No/refused   Reason patient is not wearing? Patient refused   Size of Mask Medium/Large   Sized Appropriately? Yes   Equipment Type V60   CPAP (cm H2O) 14   Ipap 7   Set Rate (Breaths/Min) 10   ITime (sec) 1   Rise Time (sec) 3   Education   $ Education Bronchodilator;BiPAP;30 min

## 2024-12-30 NOTE — NURSING
"Pt. seen for Wound Care Consult.      Pt. reported to me that 2 days prior to admit that "I stepped on a nail and  no it's not the first time".   Cleansed Rt. Plantar foot wound gently with NS and gauze.  Patted dry with gauze. Applied double layered Xeroform gauze then dry gauze.  Wrapped with roll gauze and secured with tape.  Applied ACE Bandage and wicho. Well.         Pt. has scattered areas of multiple small scabs over all of body.  Reports that he is allergic to mosquitos and that they were biting him. Pt. Reports that he has had the rash around the abdomen for 1-2 months.  Reports that it itches almost all the time.                                                   "

## 2024-12-30 NOTE — CONSULTS
[facility]  Adult Nutrition  Education Short Note      Nutrition Education    Previous education: yes    Diet at home: Low salt    Written information provided and explained on  fluid restriction: 1200 mL and 2 gram sodium diet diet.     Discussed with: patient and significant other    Educational Need? yes    Barriers: readiness to learn    Interventions: Fluid modified diet and Sodium modified diet    Patient and/or family comprehend instructions: yes    Outcome: Verbalizes understanding     Thanks for the consult!    Clarice Elliott RD 12/30/2024 3:21 PM

## 2024-12-30 NOTE — PROGRESS NOTES
Pharmacokinetic Assessment Follow Up: IV Vancomycin    Vancomycin serum concentration assessment(s):    The trough level was drawn correctly and can be used to guide therapy at this time. The measurement is above the desired definitive target range of 10 to 15 mcg/mL.    Vancomycin Regimen Plan:    Change regimen to Vancomycin 1500 mg IV every 12 hours with next serum trough concentration measured at 2000 prior to 3rd dose on 12/31    Drug levels (last 3 results):  Recent Labs   Lab Result Units 12/30/24  0753   Vancomycin-Trough ug/mL 17.2       Pharmacy will continue to follow and monitor vancomycin.    Please contact pharmacy at extension [phone] for questions regarding this assessment.    Thank you for the consult,   Elie Scales       Patient brief summary:  Renard Jimenez is a 59 y.o. male initiated on antimicrobial therapy with IV Vancomycin for treatment of skin & soft tissue infection    The patient's current regimen is vancomycin 1500 mg Q12H    Drug Allergies:   Review of patient's allergies indicates:   Allergen Reactions    Dog dander Hives    House dust Hives    Maple flavor Other (See Comments)     Throat swelling      Pollen extracts Hives       Actual Body Weight:   139.2 kg    Renal Function:   Estimated Creatinine Clearance: 109.8 mL/min (based on SCr of 1 mg/dL).,     Dialysis Method (if applicable):  N/A    CBC (last 72 hours):  Recent Labs   Lab Result Units 12/28/24 1805 12/29/24 0324 12/30/24  0453   WBC K/uL 8.87  --  8.41   Hemoglobin g/dL 15.9  --  15.7   Hemoglobin A1C %  --  6.2  --    Hematocrit % 51.2  --  51.2   Platelets K/uL 213  --  234   Gran % % 79.7*  --  75.5*   Lymph % % 12.4*  --  15.9*   Mono % % 7.4  --  8.2   Eosinophil % % 0.0  --  0.0   Basophil % % 0.3  --  0.2   Differential Method  Automated  --  Automated       Metabolic Panel (last 72 hours):  Recent Labs   Lab Result Units 12/28/24 1805 12/29/24 0324 12/30/24  0453   Sodium mmol/L 132*  --  138   Potassium  mmol/L 5.1  --  4.2   Chloride mmol/L 93*  --  91*   CO2 mmol/L 32*  --  42*   Glucose mg/dL 105  --  97   BUN mg/dL 12  --  16   Creatinine mg/dL 0.8  --  1.0   Albumin g/dL 3.6  --   --    Total Bilirubin mg/dL 1.4*  --   --    Alkaline Phosphatase U/L 88  --   --    AST U/L 26  --   --    ALT U/L 40  --   --    Magnesium mg/dL  --  1.8 1.8   Phosphorus mg/dL  --  3.8 4.5       Vancomycin Administrations:  vancomycin given in the last 96 hours                     vancomycin (VANCOCIN) 2,000 mg in 0.9% NaCl 500 mL IVPB (mg) 2,000 mg New Bag 12/30/24 0840      Restarted 12/29/24 2151     2,000 mg New Bag  2103     2,000 mg New Bag  0900    vancomycin (VANCOCIN) 2,000 mg in 0.9% NaCl 500 mL IVPB (mg) 2,000 mg New Bag 12/28/24 2024                    Microbiologic Results:  Microbiology Results (last 7 days)       Procedure Component Value Units Date/Time    Aerobic culture [[id number]]  (Abnormal) Collected: 12/29/24 1410    Order Status: Completed Specimen: Wound from Foot, Right Updated: 12/30/24 0746     Aerobic Bacterial Culture STAPHYLOCOCCUS AUREUS  Moderate  For susceptibility see order #[id number]      Aerobic culture [[id number]]  (Abnormal) Collected: 12/28/24 1906    Order Status: Completed Specimen: Abscess from Foot, Right Updated: 12/30/24 0739     Aerobic Bacterial Culture STAPHYLOCOCCUS AUREUS  Many  Susceptibility pending      Blood culture #1 **CANNOT BE ORDERED STAT** [[id number]] Collected: 12/28/24 1845    Order Status: Completed Specimen: Blood Updated: 12/29/24 1517     Blood Culture, Routine No Growth to date    Blood culture #2 **CANNOT BE ORDERED STAT** [[id number]] Collected: 12/28/24 1850    Order Status: Completed Specimen: Blood Updated: 12/29/24 1517     Blood Culture, Routine No Growth to date    Culture, Anaerobe [[id number]] Collected: 12/29/24 1410    Order Status: Sent Specimen: Wound from Foot, Right Updated: 12/29/24 1437    Influenza A & B by Molecular [[id number]] Collected:  12/28/24 1832    Order Status: Completed Specimen: Nasopharyngeal Swab Updated: 12/28/24 1932     Influenza A, Molecular Negative     Influenza B, Molecular Negative     Flu A & B Source Nasal swab

## 2024-12-30 NOTE — PLAN OF CARE
Problem: Skin Injury Risk Increased  Goal: Skin Health and Integrity  Outcome: Progressing     Problem: Adult Inpatient Plan of Care  Goal: Plan of Care Review  Outcome: Progressing  Goal: Patient-Specific Goal (Individualized)  Outcome: Progressing  Goal: Absence of Hospital-Acquired Illness or Injury  Outcome: Progressing  Goal: Optimal Comfort and Wellbeing  Outcome: Progressing  Goal: Readiness for Transition of Care  Outcome: Progressing     Problem: Bariatric Environmental Safety  Goal: Safety Maintained with Care  Outcome: Progressing     Problem: COPD (Chronic Obstructive Pulmonary Disease)  Goal: Optimal Chronic Illness Coping  Outcome: Progressing  Goal: Optimal Level of Functional [place]  Outcome: Progressing  Goal: Absence of Infection Signs and Symptoms  Outcome: Progressing  Goal: Improved Oral Intake  Outcome: Progressing  Goal: Effective Oxygenation and Ventilation  Outcome: Progressing     Problem: Wound  Goal: Optimal Coping  Outcome: Progressing  Goal: Optimal Functional Ability  Outcome: Progressing  Goal: Absence of Infection Signs and Symptoms  Outcome: Progressing  Goal: Improved Oral Intake  Outcome: Progressing  Goal: Optimal Pain Control and Function  Outcome: Progressing  Goal: Skin Health and Integrity  Outcome: Progressing  Goal: Optimal Wound Healing  Outcome: Progressing     Problem: Infection  Goal: Absence of Infection Signs and Symptoms  Outcome: Progressing

## 2024-12-31 LAB
ANION GAP SERPL CALC-SCNC: 6 MMOL/L (ref 8–16)
BACTERIA SPEC AEROBE CULT: ABNORMAL
BACTERIA SPEC AEROBE CULT: ABNORMAL
BASOPHILS # BLD AUTO: 0.02 K/UL (ref 0–0.2)
BASOPHILS NFR BLD: 0.2 % (ref 0–1.9)
BUN SERPL-MCNC: 19 MG/DL (ref 6–20)
CALCIUM SERPL-MCNC: 8.9 MG/DL (ref 8.7–10.5)
CHLORIDE SERPL-SCNC: 89 MMOL/L (ref 95–110)
CO2 SERPL-SCNC: 42 MMOL/L (ref 23–29)
CREAT SERPL-MCNC: 0.9 MG/DL (ref 0.5–1.4)
DIFFERENTIAL METHOD BLD: ABNORMAL
EOSINOPHIL # BLD AUTO: 0.3 K/UL (ref 0–0.5)
EOSINOPHIL NFR BLD: 3.4 % (ref 0–8)
ERYTHROCYTE [DISTWIDTH] IN BLOOD BY AUTOMATED COUNT: 14 % (ref 11.5–14.5)
EST. GFR  (NO RACE VARIABLE): >60 ML/MIN/1.73 M^2
GLUCOSE SERPL-MCNC: 92 MG/DL (ref 70–110)
HCT VFR BLD AUTO: 49.2 % (ref 40–54)
HGB BLD-MCNC: 15.3 G/DL (ref 14–18)
IMM GRANULOCYTES # BLD AUTO: 0.03 K/UL (ref 0–0.04)
IMM GRANULOCYTES NFR BLD AUTO: 0.3 % (ref 0–0.5)
LYMPHOCYTES # BLD AUTO: 1.5 K/UL (ref 1–4.8)
LYMPHOCYTES NFR BLD: 16.2 % (ref 18–48)
MAGNESIUM SERPL-MCNC: 1.6 MG/DL (ref 1.6–2.6)
MCH RBC QN AUTO: 32.5 PG (ref 27–31)
MCHC RBC AUTO-ENTMCNC: 31.1 G/DL (ref 32–36)
MCV RBC AUTO: 105 FL (ref 82–98)
MONOCYTES # BLD AUTO: 0.7 K/UL (ref 0.3–1)
MONOCYTES NFR BLD: 7.8 % (ref 4–15)
NEUTROPHILS # BLD AUTO: 6.5 K/UL (ref 1.8–7.7)
NEUTROPHILS NFR BLD: 72.1 % (ref 38–73)
NRBC BLD-RTO: 0 /100 WBC
OHS QRS DURATION: 108 MS
OHS QTC CALCULATION: 471 MS
PHOSPHATE SERPL-MCNC: 3.9 MG/DL (ref 2.7–4.5)
PLATELET # BLD AUTO: 245 K/UL (ref 150–450)
PMV BLD AUTO: 10.2 FL (ref 9.2–12.9)
POTASSIUM SERPL-SCNC: 4.1 MMOL/L (ref 3.5–5.1)
RBC # BLD AUTO: 4.71 M/UL (ref 4.6–6.2)
SODIUM SERPL-SCNC: 137 MMOL/L (ref 136–145)
VANCOMYCIN TROUGH SERPL-MCNC: 17.5 UG/ML
WBC # BLD AUTO: 9.01 K/UL (ref 3.9–12.7)

## 2024-12-31 PROCEDURE — 63600175 PHARM REV CODE 636 W HCPCS: Performed by: STUDENT IN AN ORGANIZED HEALTH CARE EDUCATION/TRAINING PROGRAM

## 2024-12-31 PROCEDURE — 80048 BASIC METABOLIC PNL TOTAL CA: CPT | Performed by: STUDENT IN AN ORGANIZED HEALTH CARE EDUCATION/TRAINING PROGRAM

## 2024-12-31 PROCEDURE — 94640 AIRWAY INHALATION TREATMENT: CPT

## 2024-12-31 PROCEDURE — 25000003 PHARM REV CODE 250: Performed by: HOSPITALIST

## 2024-12-31 PROCEDURE — 27000207 HC ISOLATION

## 2024-12-31 PROCEDURE — 99900031 HC PATIENT EDUCATION (STAT)

## 2024-12-31 PROCEDURE — 94660 CPAP INITIATION&MGMT: CPT

## 2024-12-31 PROCEDURE — 27000221 HC OXYGEN, UP TO 24 HOURS

## 2024-12-31 PROCEDURE — 85025 COMPLETE CBC W/AUTO DIFF WBC: CPT | Performed by: HOSPITALIST

## 2024-12-31 PROCEDURE — 99900035 HC TECH TIME PER 15 MIN (STAT)

## 2024-12-31 PROCEDURE — 84100 ASSAY OF PHOSPHORUS: CPT | Performed by: STUDENT IN AN ORGANIZED HEALTH CARE EDUCATION/TRAINING PROGRAM

## 2024-12-31 PROCEDURE — 25000003 PHARM REV CODE 250: Performed by: STUDENT IN AN ORGANIZED HEALTH CARE EDUCATION/TRAINING PROGRAM

## 2024-12-31 PROCEDURE — 63600175 PHARM REV CODE 636 W HCPCS: Performed by: HOSPITALIST

## 2024-12-31 PROCEDURE — 20000000 HC ICU ROOM

## 2024-12-31 PROCEDURE — 25000242 PHARM REV CODE 250 ALT 637 W/ HCPCS: Performed by: STUDENT IN AN ORGANIZED HEALTH CARE EDUCATION/TRAINING PROGRAM

## 2024-12-31 PROCEDURE — 80202 ASSAY OF VANCOMYCIN: CPT | Performed by: HOSPITALIST

## 2024-12-31 PROCEDURE — 83735 ASSAY OF MAGNESIUM: CPT | Performed by: STUDENT IN AN ORGANIZED HEALTH CARE EDUCATION/TRAINING PROGRAM

## 2024-12-31 PROCEDURE — 94761 N-INVAS EAR/PLS OXIMETRY MLT: CPT

## 2024-12-31 RX ORDER — LANOLIN ALCOHOL/MO/W.PET/CERES
800 CREAM (GRAM) TOPICAL
Status: DISCONTINUED | OUTPATIENT
Start: 2024-12-31 | End: 2025-01-02 | Stop reason: HOSPADM

## 2024-12-31 RX ORDER — CALCIUM GLUCONATE 20 MG/ML
2 INJECTION, SOLUTION INTRAVENOUS
Status: DISCONTINUED | OUTPATIENT
Start: 2024-12-31 | End: 2025-01-02 | Stop reason: HOSPADM

## 2024-12-31 RX ORDER — SODIUM,POTASSIUM PHOSPHATES 280-250MG
2 POWDER IN PACKET (EA) ORAL
Status: DISCONTINUED | OUTPATIENT
Start: 2024-12-31 | End: 2025-01-02 | Stop reason: HOSPADM

## 2024-12-31 RX ORDER — POTASSIUM CHLORIDE 20 MEQ/1
20 TABLET, EXTENDED RELEASE ORAL
Status: DISCONTINUED | OUTPATIENT
Start: 2024-12-31 | End: 2025-01-02 | Stop reason: HOSPADM

## 2024-12-31 RX ORDER — CALCIUM GLUCONATE 20 MG/ML
1 INJECTION, SOLUTION INTRAVENOUS
Status: DISCONTINUED | OUTPATIENT
Start: 2024-12-31 | End: 2025-01-02 | Stop reason: HOSPADM

## 2024-12-31 RX ORDER — MAGNESIUM SULFATE HEPTAHYDRATE 40 MG/ML
2 INJECTION, SOLUTION INTRAVENOUS
Status: DISCONTINUED | OUTPATIENT
Start: 2024-12-31 | End: 2025-01-02 | Stop reason: HOSPADM

## 2024-12-31 RX ORDER — SODIUM CHLORIDE 9 MG/ML
INJECTION, SOLUTION INTRAVENOUS CONTINUOUS
Status: DISCONTINUED | OUTPATIENT
Start: 2024-12-31 | End: 2024-12-31

## 2024-12-31 RX ORDER — MICONAZOLE NITRATE 2 G/100G
POWDER TOPICAL 2 TIMES DAILY
Status: DISCONTINUED | OUTPATIENT
Start: 2024-12-31 | End: 2025-01-02 | Stop reason: HOSPADM

## 2024-12-31 RX ORDER — MAGNESIUM SULFATE HEPTAHYDRATE 40 MG/ML
4 INJECTION, SOLUTION INTRAVENOUS
Status: DISCONTINUED | OUTPATIENT
Start: 2024-12-31 | End: 2025-01-02 | Stop reason: HOSPADM

## 2024-12-31 RX ORDER — CALCIUM GLUCONATE 20 MG/ML
3 INJECTION, SOLUTION INTRAVENOUS
Status: DISCONTINUED | OUTPATIENT
Start: 2024-12-31 | End: 2025-01-02 | Stop reason: HOSPADM

## 2024-12-31 RX ADMIN — VANCOMYCIN HYDROCHLORIDE 1500 MG: 1.5 INJECTION, POWDER, LYOPHILIZED, FOR SOLUTION INTRAVENOUS at 08:12

## 2024-12-31 RX ADMIN — LISINOPRIL 20 MG: 20 TABLET ORAL at 08:12

## 2024-12-31 RX ADMIN — IPRATROPIUM BROMIDE AND ALBUTEROL SULFATE 3 ML: .5; 3 SOLUTION RESPIRATORY (INHALATION) at 01:12

## 2024-12-31 RX ADMIN — GABAPENTIN 300 MG: 300 CAPSULE ORAL at 08:12

## 2024-12-31 RX ADMIN — IPRATROPIUM BROMIDE AND ALBUTEROL SULFATE 3 ML: .5; 3 SOLUTION RESPIRATORY (INHALATION) at 07:12

## 2024-12-31 RX ADMIN — SODIUM CHLORIDE: 9 INJECTION, SOLUTION INTRAVENOUS at 02:12

## 2024-12-31 RX ADMIN — Medication 800 MG: at 09:12

## 2024-12-31 RX ADMIN — TRAMADOL HYDROCHLORIDE 50 MG: 50 TABLET, COATED ORAL at 02:12

## 2024-12-31 RX ADMIN — MUPIROCIN 1 G: 20 OINTMENT TOPICAL at 09:12

## 2024-12-31 RX ADMIN — METOPROLOL TARTRATE 25 MG: 25 TABLET, FILM COATED ORAL at 08:12

## 2024-12-31 RX ADMIN — METOPROLOL TARTRATE 25 MG: 25 TABLET, FILM COATED ORAL at 09:12

## 2024-12-31 RX ADMIN — HYDROCHLOROTHIAZIDE 25 MG: 25 TABLET ORAL at 08:12

## 2024-12-31 RX ADMIN — GABAPENTIN 300 MG: 300 CAPSULE ORAL at 09:12

## 2024-12-31 RX ADMIN — MONTELUKAST 10 MG: 10 TABLET, FILM COATED ORAL at 08:12

## 2024-12-31 RX ADMIN — ENOXAPARIN SODIUM 40 MG: 40 INJECTION SUBCUTANEOUS at 08:12

## 2024-12-31 RX ADMIN — Medication 800 MG: at 10:12

## 2024-12-31 RX ADMIN — VANCOMYCIN HYDROCHLORIDE 1500 MG: 1.5 INJECTION, POWDER, LYOPHILIZED, FOR SOLUTION INTRAVENOUS at 09:12

## 2024-12-31 RX ADMIN — BUPROPION HYDROCHLORIDE 150 MG: 150 TABLET, EXTENDED RELEASE ORAL at 08:12

## 2024-12-31 RX ADMIN — TRAMADOL HYDROCHLORIDE 50 MG: 50 TABLET, COATED ORAL at 08:12

## 2024-12-31 RX ADMIN — LOSARTAN POTASSIUM 50 MG: 50 TABLET, FILM COATED ORAL at 08:12

## 2024-12-31 RX ADMIN — ENOXAPARIN SODIUM 40 MG: 40 INJECTION SUBCUTANEOUS at 09:12

## 2024-12-31 RX ADMIN — TRAMADOL HYDROCHLORIDE 50 MG: 50 TABLET, COATED ORAL at 09:12

## 2024-12-31 RX ADMIN — IPRATROPIUM BROMIDE AND ALBUTEROL SULFATE 3 ML: .5; 3 SOLUTION RESPIRATORY (INHALATION) at 06:12

## 2024-12-31 RX ADMIN — BUDESONIDE INHALATION 1 MG: 0.5 SUSPENSION RESPIRATORY (INHALATION) at 07:12

## 2024-12-31 RX ADMIN — ARFORMOTEROL TARTRATE 15 MCG: 15 SOLUTION RESPIRATORY (INHALATION) at 07:12

## 2024-12-31 RX ADMIN — PANTOPRAZOLE SODIUM 40 MG: 40 TABLET, DELAYED RELEASE ORAL at 05:12

## 2024-12-31 RX ADMIN — BUDESONIDE INHALATION 1 MG: 0.5 SUSPENSION RESPIRATORY (INHALATION) at 06:12

## 2024-12-31 RX ADMIN — MUPIROCIN 1 G: 20 OINTMENT TOPICAL at 08:12

## 2024-12-31 RX ADMIN — MICONAZOLE NITRATE: 20 POWDER TOPICAL at 09:12

## 2024-12-31 RX ADMIN — ARFORMOTEROL TARTRATE 15 MCG: 15 SOLUTION RESPIRATORY (INHALATION) at 06:12

## 2024-12-31 NOTE — PLAN OF CARE
Problem: Skin Injury Risk Increased  Goal: Skin Health and Integrity  Outcome: Progressing     Problem: Adult Inpatient Plan of Care  Goal: Plan of Care Review  Outcome: Progressing  Goal: Absence of Hospital-Acquired Illness or Injury  Outcome: Progressing  Goal: Optimal Comfort and Wellbeing  Outcome: Progressing  Goal: Readiness for Transition of Care  Outcome: Progressing     Problem: COPD (Chronic Obstructive Pulmonary Disease)  Goal: Optimal Chronic Illness Coping  Outcome: Progressing  Goal: Optimal Level of Functional [place]  Outcome: Progressing  Goal: Absence of Infection Signs and Symptoms  Outcome: Progressing  Goal: Improved Oral Intake  Outcome: Progressing  Goal: Effective Oxygenation and Ventilation  Outcome: Progressing

## 2024-12-31 NOTE — ASSESSMENT & PLAN NOTE
Body mass index is 44.67 kg/m². Morbid obesity complicates all aspects of disease management from diagnostic modalities to treatment. Weight loss encouraged and health benefits explained to patient.

## 2024-12-31 NOTE — ASSESSMENT & PLAN NOTE
Patient's blood pressure range in the last 24 hours was: BP  Min: 110/57  Max: 176/97.The patient's inpatient anti-hypertensive regimen is listed below:  Current Antihypertensives  , Daily, Oral  , Daily, Oral  losartan tablet 50 mg, Daily, Oral  metoprolol tartrate (LOPRESSOR) tablet 25 mg, 2 times daily, Oral  lisinopriL tablet 20 mg, Daily, Oral  hydroCHLOROthiazide tablet 25 mg, Daily, Oral    Plan  - BP is controlled, no changes needed to their regimen

## 2024-12-31 NOTE — SUBJECTIVE & OBJECTIVE
Interval History:  ABG's show continued resp acidosis.  Placed back on BiPAP this morning.  Continues on abx for foot.    Review of Systems   Constitutional:  Negative for fever.   Cardiovascular:  Negative for chest pain.   Gastrointestinal:  Negative for abdominal pain.     Objective:     Vital Signs (Most Recent):  Temp: 98.9 °F (37.2 °C) (12/30/24 1901)  Pulse: 72 (12/30/24 1919)  Resp: (!) 25 (12/30/24 1919)  BP: 123/70 (12/30/24 1901)  SpO2: 97 % (12/30/24 1919) Vital Signs (24h Range):  Temp:  [97.4 °F (36.3 °C)-98.9 °F (37.2 °C)] 98.9 °F (37.2 °C)  Pulse:  [] 72  Resp:  [14-56] 25  SpO2:  [76 %-100 %] 97 %  BP: (110-176)/(56-97) 123/70     Weight: (!) 139.2 kg (306 lb 14.1 oz)  Body mass index is 44.67 kg/m².    Intake/Output Summary (Last 24 hours) at 12/30/2024 2005  Last data filed at 12/30/2024 1730  Gross per 24 hour   Intake 1687.9 ml   Output 3345 ml   Net -1657.1 ml         Physical Exam  Vitals reviewed.   Constitutional:       General: He is not in acute distress.     Appearance: He is not diaphoretic.   HENT:      Mouth/Throat:      Mouth: Mucous membranes are moist.   Eyes:      General: No scleral icterus.        Right eye: No discharge.         Left eye: No discharge.   Neck:      Vascular: No JVD.   Cardiovascular:      Rate and Rhythm: Normal rate and regular rhythm.   Pulmonary:      Effort: Pulmonary effort is normal.      Breath sounds: Normal breath sounds. Decreased air movement present.      Comments: BiPAP applied  Abdominal:      General: Bowel sounds are normal. There is no distension.      Palpations: Abdomen is soft.      Tenderness: There is no abdominal tenderness.   Skin:     General: Skin is warm.      Findings: No rash.   Neurological:      Mental Status: He is alert.             Significant Labs: All pertinent labs within the past 24 hours have been reviewed.    Significant Imaging: I have reviewed all pertinent imaging results/findings within the past 24 hours.

## 2024-12-31 NOTE — ASSESSMENT & PLAN NOTE
Patient's COPD is with exacerbation noted by continued dyspnea currently.  Patient is currently off COPD Pathway. Continue scheduled inhalers  , Duonebs PRN  and monitor respiratory status closely.

## 2024-12-31 NOTE — ASSESSMENT & PLAN NOTE
Patient with Hypercapnic and Hypoxic Respiratory failure which is Acute on chronic.  he is on home oxygen at 1.5 LPM.   Currently, using BiPAP.  .   Signs/symptoms of respiratory failure include- increased work of breathing and respiratory distress. Contributing diagnoses includes - CHF and COPD Labs and images were reviewed. Patient Has recent ABG, which has been reviewed.

## 2024-12-31 NOTE — ASSESSMENT & PLAN NOTE
Patient presented with respiratory failure, edema in lower extremities.  Urinary output of 2.9L after 40mg lasix with improvement of dyspnea. Currently has minimal fluid in lower extremities.  Suspect exacerbation of heart failure in the setting of cellulitis.      Getting lasix . Will continue with IV 40 mg daily   Continue GDMT  Monitor urine output  Telemetry  Repeat ECHO = 50 to 55% EF  Cardio MD cx if needed

## 2024-12-31 NOTE — ASSESSMENT & PLAN NOTE
Hyponatremia is likely due to Heart Failure. The patient's most recent sodium results are listed below.  Recent Labs     12/28/24  1805 12/30/24  0453   * 138     Plan  - Monitor sodium Daily.   - Patient hyponatremia is improving

## 2024-12-31 NOTE — PROGRESS NOTES
[facility] Medicine  Progress Note    Patient Name: Renard Jimenez  MRN: [id number]  Patient Class: IP- Inpatient   Admission Date: 12/28/2024  Length of Stay: 2 days  Attending Physician: Ronnell Kate MD  Primary Care Provider: Juancho Ortiz MD        Subjective     Principal Problem:Acute on chronic respiratory failure with hypoxia and hypercapnia        HPI:  59 year old male with comorbid conditions of COPD on 1.5 lpm home oxygen, HFpEF, HLD, hypertension, h/o MI presented to [facility] due to shortness of breath.  Per EMS was saturating 64% on 1.5lpm nasal cannula, was placed on additional oxygen and brought to ED.  Per wife patient was also complaining of right foot pain after stepping on a nail 1 week prior.  Denies chest pain, fevers, nausea/vomiting, diarrhea.   On evaluation patient has crackles bilaterally, edema in extremities. BNP is 676. CXR revealed pulmonary vascular congestion.  Was given 40mg IV lasix with over 2 liter output.  Right foot noted to be erythematous with purulent drainage, U/S extremity did not reveal fluid collection.  X-ray foot revealed 1.0cm metallic foreign body within plantar soft tissue.  Started on vancomycin and zosyn.  Podiatry notified.  Patient was started on nicardipine infusion for blood pressure reaching > .    Blood gas revealed hypercapnia for which patient was started on biPAP.  Also given IV solumedrol, magnesium. Patient was transferred to [facility] for continuous bipap requirement.      Overview/Hospital Course:  59 year old male with comorbid conditions of COPD on 1.5 lpm home oxygen, HFpEF, HLD, hypertension, h/o MI presented to [facility] due to shortness of breath.  Per EMS was saturating 64% on 1.5lpm nasal cannula, was placed on additional oxygen and brought to ED.  Per wife patient was also complaining of right foot pain after stepping on a nail 1 week prior.  Denies chest pain, fevers, nausea/vomiting, diarrhea.   On evaluation  patient has crackles bilaterally, edema in extremities. BNP is 676. CXR revealed pulmonary vascular congestion.  Was given 40mg IV lasix with over 2 liter output.  Right foot noted to be erythematous with purulent drainage, U/S extremity did not reveal fluid collection.  X-ray foot revealed 1.0cm metallic foreign body within plantar soft tissue.  Started on vancomycin and zosyn.  Podiatry notified.  Patient was started on nicardipine infusion for blood pressure reaching > .    Blood gas revealed hypercapnia for which patient was started on biPAP.  Also given IV solumedrol, magnesium. Patient was transferred to [facility] for continuous bipap requirement.      Overnight admission and pls see H and P . BIPAP was able to wean     Interval History:  ABG's show continued resp acidosis.  Placed back on BiPAP this morning.  Continues on abx for foot.    Review of Systems   Constitutional:  Negative for fever.   Cardiovascular:  Negative for chest pain.   Gastrointestinal:  Negative for abdominal pain.     Objective:     Vital Signs (Most Recent):  Temp: 98.9 °F (37.2 °C) (12/30/24 1901)  Pulse: 72 (12/30/24 1919)  Resp: (!) 25 (12/30/24 1919)  BP: 123/70 (12/30/24 1901)  SpO2: 97 % (12/30/24 1919) Vital Signs (24h Range):  Temp:  [97.4 °F (36.3 °C)-98.9 °F (37.2 °C)] 98.9 °F (37.2 °C)  Pulse:  [] 72  Resp:  [14-56] 25  SpO2:  [76 %-100 %] 97 %  BP: (110-176)/(56-97) 123/70     Weight: (!) 139.2 kg (306 lb 14.1 oz)  Body mass index is 44.67 kg/m².    Intake/Output Summary (Last 24 hours) at 12/30/2024 2005  Last data filed at 12/30/2024 1730  Gross per 24 hour   Intake 1687.9 ml   Output 3345 ml   Net -1657.1 ml         Physical Exam  Vitals reviewed.   Constitutional:       General: He is not in acute distress.     Appearance: He is not diaphoretic.   HENT:      Mouth/Throat:      Mouth: Mucous membranes are moist.   Eyes:      General: No scleral icterus.        Right eye: No discharge.         Left eye: No  discharge.   Neck:      Vascular: No JVD.   Cardiovascular:      Rate and Rhythm: Normal rate and regular rhythm.   Pulmonary:      Effort: Pulmonary effort is normal.      Breath sounds: Normal breath sounds. Decreased air movement present.      Comments: BiPAP applied  Abdominal:      General: Bowel sounds are normal. There is no distension.      Palpations: Abdomen is soft.      Tenderness: There is no abdominal tenderness.   Skin:     General: Skin is warm.      Findings: No rash.   Neurological:      Mental Status: He is alert.             Significant Labs: All pertinent labs within the past 24 hours have been reviewed.    Significant Imaging: I have reviewed all pertinent imaging results/findings within the past 24 hours.    Assessment and Plan     * Acute on chronic respiratory failure with hypoxia and hypercapnia  Patient with Hypercapnic and Hypoxic Respiratory failure which is Acute on chronic.  he is on home oxygen at 1.5 LPM.   Currently, using BiPAP.  .   Signs/symptoms of respiratory failure include- increased work of breathing and respiratory distress. Contributing diagnoses includes - CHF and COPD Labs and images were reviewed. Patient Has recent ABG, which has been reviewed.     Hyponatremia  Hyponatremia is likely due to Heart Failure. The patient's most recent sodium results are listed below.  Recent Labs     12/28/24  1805 12/30/24  0453   * 138     Plan  - Monitor sodium Daily.   - Patient hyponatremia is improving    Morbid obesity  Body mass index is 44.67 kg/m². Morbid obesity complicates all aspects of disease management from diagnostic modalities to treatment. Weight loss encouraged and health benefits explained to patient.         Right foot infection  S. aureus growing.  Continue current abx.  Monitor for improvement.  Appreciate podiatry's help.      Puncture wound of right foot with foreign body  Podiatry managing.  Removal of metal foreign body done.      Acute on chronic heart  "failure with preserved ejection fraction  Patient presented with respiratory failure, edema in lower extremities.  Urinary output of 2.9L after 40mg lasix with improvement of dyspnea. Currently has minimal fluid in lower extremities.  Suspect exacerbation of heart failure in the setting of cellulitis.      Getting lasix . Will continue with IV 40 mg daily   Continue GDMT  Monitor urine output  Telemetry  Repeat ECHO = 50 to 55% EF  Cardio MD cx if needed      Mixed hyperlipidemia  Continue statin       Essential hypertension  Patient's blood pressure range in the last 24 hours was: BP  Min: 110/57  Max: 176/97.The patient's inpatient anti-hypertensive regimen is listed below:  Current Antihypertensives  , Daily, Oral  , Daily, Oral  losartan tablet 50 mg, Daily, Oral  metoprolol tartrate (LOPRESSOR) tablet 25 mg, 2 times daily, Oral  lisinopriL tablet 20 mg, Daily, Oral  hydroCHLOROthiazide tablet 25 mg, Daily, Oral    Plan  - BP is controlled, no changes needed to their regimen    Chronic obstructive pulmonary disease with acute exacerbation  Patient's COPD is with exacerbation noted by continued dyspnea currently.  Patient is currently off COPD Pathway. Continue scheduled inhalers  , Duonebs PRN  and monitor respiratory status closely.       VTE Risk Mitigation (From admission, onward)           Ordered     enoxaparin injection 40 mg  Every 12 hours        Note to Pharmacy: Ht: 5' 9.5" (1.765 m)  Wt: (!) 140.9 kg (310 lb 10.1 oz)  Estimated Creatinine Clearance: 138.2 mL/min (based on SCr of 0.8 mg/dL).  Body mass index is 45.21 kg/m².    12/29/24 0024     IP VTE HIGH RISK PATIENT  Once         12/29/24 0013     Place sequential compression device  Until discontinued         12/29/24 0013                    Discharge Planning   TANIA: 1/2/2025     Code Status: Full Code   Medical Readiness for Discharge Date:   Discharge Plan A: Home with family        \        Ronnell Kate MD  Department of Hospital Medicine "   [facility]

## 2024-12-31 NOTE — CARE UPDATE
12/31/24 0653   Patient Assessment/Suction   Level of Consciousness (AVPU) alert   Respiratory Effort Normal;Unlabored   Expansion/Accessory Muscles/Retractions no use of accessory muscles;no retractions;expansion symmetric   All Lung Fields Breath Sounds diminished   Rhythm/Pattern, Respiratory unlabored;pattern regular;depth regular   Cough Frequency infrequent   Cough Type congested   PRE-TX-O2   Device (Oxygen Therapy) nasal cannula with humidification   $ Is the patient on Low Flow Oxygen? Yes   Flow (L/min) (Oxygen Therapy) 2   Oxygen Concentration (%) 28   SpO2 (!) 91 %   Pulse Oximetry Type Continuous   $ Pulse Oximetry - Multiple Charge Pulse Oximetry - Multiple   Pulse 76   Resp (!) 22   Aerosol Therapy   $ Aerosol Therapy Charges Aerosol Treatment   Daily Review of Necessity (SVN) completed   Respiratory Treatment Status (SVN) given   Treatment Route (SVN) oxygen;mask   Patient Position HOB elevated   Post Treatment Assessment (SVN) breath sounds unchanged   Signs of Intolerance (SVN) none   Ready to Wean/Extubation Screen   FIO2<=50 (chart decimal) 0.28   Preset CPAP/BiPAP Settings   Mode Of Delivery standby   CPAP/BIPAP charged w/in last 24 h NO

## 2024-12-31 NOTE — ASSESSMENT & PLAN NOTE
S. aureus growing.  Continue current abx.  Monitor for improvement.  Appreciate podiatry's help.

## 2025-01-01 LAB
ANION GAP SERPL CALC-SCNC: 5 MMOL/L (ref 8–16)
BACTERIA SPEC ANAEROBE CULT: NORMAL
BASOPHILS # BLD AUTO: 0.03 K/UL (ref 0–0.2)
BASOPHILS NFR BLD: 0.4 % (ref 0–1.9)
BUN SERPL-MCNC: 15 MG/DL (ref 6–20)
CALCIUM SERPL-MCNC: 9.2 MG/DL (ref 8.7–10.5)
CHLORIDE SERPL-SCNC: 88 MMOL/L (ref 95–110)
CO2 SERPL-SCNC: 39 MMOL/L (ref 23–29)
CREAT SERPL-MCNC: 0.9 MG/DL (ref 0.5–1.4)
DIFFERENTIAL METHOD BLD: ABNORMAL
EOSINOPHIL # BLD AUTO: 0 K/UL (ref 0–0.5)
EOSINOPHIL NFR BLD: 0 % (ref 0–8)
ERYTHROCYTE [DISTWIDTH] IN BLOOD BY AUTOMATED COUNT: 14.1 % (ref 11.5–14.5)
EST. GFR  (NO RACE VARIABLE): >60 ML/MIN/1.73 M^2
GLUCOSE SERPL-MCNC: 103 MG/DL (ref 70–110)
HCT VFR BLD AUTO: 48.1 % (ref 40–54)
HGB BLD-MCNC: 14.9 G/DL (ref 14–18)
IMM GRANULOCYTES # BLD AUTO: 0.04 K/UL (ref 0–0.04)
IMM GRANULOCYTES NFR BLD AUTO: 0.5 % (ref 0–0.5)
LYMPHOCYTES # BLD AUTO: 1.3 K/UL (ref 1–4.8)
LYMPHOCYTES NFR BLD: 15.6 % (ref 18–48)
MAGNESIUM SERPL-MCNC: 1.8 MG/DL (ref 1.6–2.6)
MCH RBC QN AUTO: 31.7 PG (ref 27–31)
MCHC RBC AUTO-ENTMCNC: 31 G/DL (ref 32–36)
MCV RBC AUTO: 102 FL (ref 82–98)
MONOCYTES # BLD AUTO: 0.7 K/UL (ref 0.3–1)
MONOCYTES NFR BLD: 8.8 % (ref 4–15)
NEUTROPHILS # BLD AUTO: 6.2 K/UL (ref 1.8–7.7)
NEUTROPHILS NFR BLD: 74.7 % (ref 38–73)
NRBC BLD-RTO: 0 /100 WBC
PLATELET # BLD AUTO: 220 K/UL (ref 150–450)
PMV BLD AUTO: 10.2 FL (ref 9.2–12.9)
POTASSIUM SERPL-SCNC: 4.7 MMOL/L (ref 3.5–5.1)
RBC # BLD AUTO: 4.7 M/UL (ref 4.6–6.2)
SODIUM SERPL-SCNC: 132 MMOL/L (ref 136–145)
WBC # BLD AUTO: 8.25 K/UL (ref 3.9–12.7)

## 2025-01-01 PROCEDURE — 25000003 PHARM REV CODE 250: Performed by: STUDENT IN AN ORGANIZED HEALTH CARE EDUCATION/TRAINING PROGRAM

## 2025-01-01 PROCEDURE — 97161 PT EVAL LOW COMPLEX 20 MIN: CPT

## 2025-01-01 PROCEDURE — 85025 COMPLETE CBC W/AUTO DIFF WBC: CPT | Performed by: STUDENT IN AN ORGANIZED HEALTH CARE EDUCATION/TRAINING PROGRAM

## 2025-01-01 PROCEDURE — 27000207 HC ISOLATION

## 2025-01-01 PROCEDURE — 83735 ASSAY OF MAGNESIUM: CPT | Performed by: STUDENT IN AN ORGANIZED HEALTH CARE EDUCATION/TRAINING PROGRAM

## 2025-01-01 PROCEDURE — 94761 N-INVAS EAR/PLS OXIMETRY MLT: CPT

## 2025-01-01 PROCEDURE — 63600175 PHARM REV CODE 636 W HCPCS: Performed by: STUDENT IN AN ORGANIZED HEALTH CARE EDUCATION/TRAINING PROGRAM

## 2025-01-01 PROCEDURE — 80048 BASIC METABOLIC PNL TOTAL CA: CPT | Performed by: STUDENT IN AN ORGANIZED HEALTH CARE EDUCATION/TRAINING PROGRAM

## 2025-01-01 PROCEDURE — 99900035 HC TECH TIME PER 15 MIN (STAT)

## 2025-01-01 PROCEDURE — 25000003 PHARM REV CODE 250: Performed by: HOSPITALIST

## 2025-01-01 PROCEDURE — 97116 GAIT TRAINING THERAPY: CPT

## 2025-01-01 PROCEDURE — 27000221 HC OXYGEN, UP TO 24 HOURS

## 2025-01-01 PROCEDURE — 12000002 HC ACUTE/MED SURGE SEMI-PRIVATE ROOM

## 2025-01-01 PROCEDURE — 25000242 PHARM REV CODE 250 ALT 637 W/ HCPCS: Performed by: STUDENT IN AN ORGANIZED HEALTH CARE EDUCATION/TRAINING PROGRAM

## 2025-01-01 PROCEDURE — 94640 AIRWAY INHALATION TREATMENT: CPT

## 2025-01-01 PROCEDURE — 99900031 HC PATIENT EDUCATION (STAT)

## 2025-01-01 RX ADMIN — VANCOMYCIN HYDROCHLORIDE 1250 MG: 1.25 INJECTION, POWDER, LYOPHILIZED, FOR SOLUTION INTRAVENOUS at 08:01

## 2025-01-01 RX ADMIN — ENOXAPARIN SODIUM 40 MG: 40 INJECTION SUBCUTANEOUS at 09:01

## 2025-01-01 RX ADMIN — IPRATROPIUM BROMIDE AND ALBUTEROL SULFATE 3 ML: .5; 3 SOLUTION RESPIRATORY (INHALATION) at 01:01

## 2025-01-01 RX ADMIN — BUDESONIDE INHALATION 1 MG: 0.5 SUSPENSION RESPIRATORY (INHALATION) at 07:01

## 2025-01-01 RX ADMIN — BUPROPION HYDROCHLORIDE 150 MG: 150 TABLET, EXTENDED RELEASE ORAL at 08:01

## 2025-01-01 RX ADMIN — GABAPENTIN 300 MG: 300 CAPSULE ORAL at 09:01

## 2025-01-01 RX ADMIN — METOPROLOL TARTRATE 25 MG: 25 TABLET, FILM COATED ORAL at 08:01

## 2025-01-01 RX ADMIN — GABAPENTIN 300 MG: 300 CAPSULE ORAL at 08:01

## 2025-01-01 RX ADMIN — ACETAMINOPHEN 650 MG: 325 TABLET ORAL at 02:01

## 2025-01-01 RX ADMIN — MUPIROCIN 1 G: 20 OINTMENT TOPICAL at 08:01

## 2025-01-01 RX ADMIN — ARFORMOTEROL TARTRATE 15 MCG: 15 SOLUTION RESPIRATORY (INHALATION) at 07:01

## 2025-01-01 RX ADMIN — IPRATROPIUM BROMIDE AND ALBUTEROL SULFATE 3 ML: .5; 3 SOLUTION RESPIRATORY (INHALATION) at 07:01

## 2025-01-01 RX ADMIN — TRAMADOL HYDROCHLORIDE 50 MG: 50 TABLET, COATED ORAL at 10:01

## 2025-01-01 RX ADMIN — Medication 800 MG: at 05:01

## 2025-01-01 RX ADMIN — LISINOPRIL 20 MG: 20 TABLET ORAL at 08:01

## 2025-01-01 RX ADMIN — METOPROLOL TARTRATE 25 MG: 25 TABLET, FILM COATED ORAL at 09:01

## 2025-01-01 RX ADMIN — LOSARTAN POTASSIUM 50 MG: 50 TABLET, FILM COATED ORAL at 08:01

## 2025-01-01 RX ADMIN — Medication 800 MG: at 08:01

## 2025-01-01 RX ADMIN — ENOXAPARIN SODIUM 40 MG: 40 INJECTION SUBCUTANEOUS at 08:01

## 2025-01-01 RX ADMIN — MICONAZOLE NITRATE: 20 POWDER TOPICAL at 08:01

## 2025-01-01 RX ADMIN — PANTOPRAZOLE SODIUM 40 MG: 40 TABLET, DELAYED RELEASE ORAL at 05:01

## 2025-01-01 RX ADMIN — MICONAZOLE NITRATE: 20 POWDER TOPICAL at 10:01

## 2025-01-01 RX ADMIN — MUPIROCIN 1 G: 20 OINTMENT TOPICAL at 10:01

## 2025-01-01 RX ADMIN — VANCOMYCIN HYDROCHLORIDE 1250 MG: 1.25 INJECTION, POWDER, LYOPHILIZED, FOR SOLUTION INTRAVENOUS at 09:01

## 2025-01-01 RX ADMIN — MONTELUKAST 10 MG: 10 TABLET, FILM COATED ORAL at 08:01

## 2025-01-01 NOTE — ASSESSMENT & PLAN NOTE
Patient's blood pressure range in the last 24 hours was: BP  Min: 122/68  Max: 196/95.The patient's inpatient anti-hypertensive regimen is listed below:  Current Antihypertensives  , Daily, Oral  , Daily, Oral  losartan tablet 50 mg, Daily, Oral  metoprolol tartrate (LOPRESSOR) tablet 25 mg, 2 times daily, Oral  lisinopriL tablet 20 mg, Daily, Oral    Plan  - BP is controlled, no changes needed to their regimen

## 2025-01-01 NOTE — ASSESSMENT & PLAN NOTE
Hyponatremia is likely due to Heart Failure. The patient's most recent sodium results are listed below.  Recent Labs     12/30/24  0453 12/31/24  0400    137       Plan  - Monitor sodium Daily.   - Patient hyponatremia is improving

## 2025-01-01 NOTE — ASSESSMENT & PLAN NOTE
Patient presented with respiratory failure, edema in lower extremities.  Urinary output of 2.9L after 40mg lasix with improvement of dyspnea. Currently has minimal fluid in lower extremities.  Suspect exacerbation of heart failure in the setting of cellulitis.      Received IV Lasix.  Has a high serum bicarbonate concentration, so I stopped the Lasix.  Continue GDMT  Telemetry  Repeat ECHO done = 50 to 55% EF

## 2025-01-01 NOTE — SUBJECTIVE & OBJECTIVE
Interval History:  able to stay off of BiPAP.  Arterial pH is within good range.  The S.aureus in the foot is oxacillin resistant.    Review of Systems   Constitutional:  Negative for fever.   Cardiovascular:  Negative for chest pain.   Gastrointestinal:  Negative for abdominal pain.     Objective:     Vital Signs (Most Recent):  Temp: 97.8 °F (36.6 °C) (12/31/24 1515)  Pulse: 70 (12/31/24 1913)  Resp: (!) 29 (12/31/24 1913)  BP: (!) 129/91 (12/31/24 1800)  SpO2: 95 % (12/31/24 1913) Vital Signs (24h Range):  Temp:  [96.8 °F (36 °C)-98.7 °F (37.1 °C)] 97.8 °F (36.6 °C)  Pulse:  [65-83] 70  Resp:  [18-42] 29  SpO2:  [87 %-98 %] 95 %  BP: (122-196)/(60-95) 129/91     Weight: (!) 139.4 kg (307 lb 5.1 oz)  Body mass index is 44.73 kg/m².    Intake/Output Summary (Last 24 hours) at 12/31/2024 1936  Last data filed at 12/31/2024 1750  Gross per 24 hour   Intake 2118.91 ml   Output 2050 ml   Net 68.91 ml         Physical Exam  Vitals reviewed.   Constitutional:       General: He is not in acute distress.     Appearance: He is not diaphoretic.      Interventions: Nasal cannula in place.   HENT:      Mouth/Throat:      Mouth: Mucous membranes are moist.   Eyes:      General: No scleral icterus.        Right eye: No discharge.         Left eye: No discharge.   Neck:      Vascular: No JVD.   Cardiovascular:      Rate and Rhythm: Normal rate and regular rhythm.   Pulmonary:      Effort: Pulmonary effort is normal.      Breath sounds: Normal breath sounds. Decreased air movement present.   Abdominal:      General: Bowel sounds are normal. There is no distension.      Palpations: Abdomen is soft.      Tenderness: There is no abdominal tenderness.   Skin:     General: Skin is warm.      Findings: No rash.   Neurological:      Mental Status: He is alert.             Significant Labs: All pertinent labs within the past 24 hours have been reviewed.    Significant Imaging: I have reviewed all pertinent imaging results/findings within  the past 24 hours.

## 2025-01-01 NOTE — PROGRESS NOTES
Pharmacokinetic Assessment Follow Up: IV Vancomycin    Patient brief summary:  Renard Jimenez is a 59 y.o. male initiated on antimicrobial therapy with IV Vancomycin for treatment of Skin & Soft Tissue infection    The patient's current regimen is Vancomycin 1500 mg IV every 12 hours.       Actual Body Weight = (!) 139.4 kg (307 lb 5.1 oz).    Renal Function:   Estimated Creatinine Clearance: 122.1 mL/min (based on SCr of 0.9 mg/dL).      Vancomycin serum concentration assessment(s):    The trough level was drawn correctly and can be used to guide therapy at this time. The measurement is above the desired definitive target range of 10 to 15 mcg/mL.    Vancomycin Regimen Plan:    Change regimen to Vancomycin 1250 mg IV every 12 hours with next serum trough concentration measured at 0800 on 01/02    Drug levels (last 3 results):  Recent Labs   Lab Result Units 12/30/24  0753 12/31/24 2005   Vancomycin-Trough ug/mL 17.2 17.5       Pharmacy will continue to follow and monitor vancomycin.    Please contact pharmacy at extension [phone] for questions regarding this assessment.    Thank you for the consult,   Shasta Scales

## 2025-01-01 NOTE — SUBJECTIVE & OBJECTIVE
Interval History:  Patient was seen and examined at bedside this morning.  He is doing much better today.  Breathing is much improved.  Currently on 3 L O2, baseline 1.5 L at home.  Remains on vancomycin.  Hemodynamically stable.  We will consult PT/OT.  Feli to transfer from ICU.    Review of Systems   Constitutional:  Negative for fever.   Cardiovascular:  Negative for chest pain.   Gastrointestinal:  Negative for abdominal pain.     Objective:     Vital Signs (Most Recent):  Temp: 97.8 °F (36.6 °C) (01/01/25 1101)  Pulse: 68 (01/01/25 1100)  Resp: (!) 21 (01/01/25 1100)  BP: 121/67 (01/01/25 1100)  SpO2: (!) 91 % (01/01/25 1100) Vital Signs (24h Range):  Temp:  [97.1 °F (36.2 °C)-97.8 °F (36.6 °C)] 97.8 °F (36.6 °C)  Pulse:  [68-82] 68  Resp:  [18-34] 21  SpO2:  [89 %-97 %] 91 %  BP: (121-181)/(65-91) 121/67     Weight: (!) 139.4 kg (307 lb 5.1 oz)  Body mass index is 44.73 kg/m².    Intake/Output Summary (Last 24 hours) at 1/1/2025 1233  Last data filed at 1/1/2025 1100  Gross per 24 hour   Intake 3110.93 ml   Output 3725 ml   Net -614.07 ml         Physical Exam  Vitals reviewed.   Constitutional:       General: He is not in acute distress.     Appearance: He is not diaphoretic.      Interventions: Nasal cannula in place.   HENT:      Mouth/Throat:      Mouth: Mucous membranes are moist.   Eyes:      General: No scleral icterus.        Right eye: No discharge.         Left eye: No discharge.   Neck:      Vascular: No JVD.   Cardiovascular:      Rate and Rhythm: Normal rate and regular rhythm.   Pulmonary:      Effort: Pulmonary effort is normal.      Breath sounds: Normal breath sounds. Decreased air movement present.   Abdominal:      General: Bowel sounds are normal. There is no distension.      Palpations: Abdomen is soft.      Tenderness: There is no abdominal tenderness.   Skin:     General: Skin is warm.      Findings: No rash.   Neurological:      Mental Status: He is alert.             Significant Labs:  All pertinent labs within the past 24 hours have been reviewed.  CBC:   Recent Labs   Lab 12/31/24  0400 01/01/25  0400   WBC 9.01 8.25   HGB 15.3 14.9   HCT 49.2 48.1    220     CMP:   Recent Labs   Lab 12/31/24  0400 01/01/25  0400    132*   K 4.1 4.7   CL 89* 88*   CO2 42* 39*   GLU 92 103   BUN 19 15   CREATININE 0.9 0.9   CALCIUM 8.9 9.2   ANIONGAP 6* 5*       Significant Imaging: I have reviewed all pertinent imaging results/findings within the past 24 hours.  Imaging Results              US Soft Tissue, Lower Extremity, Right (Final result)  Result time 12/28/24 19:19:53      Final result by Kevin Johnsno DO (12/28/24 19:19:53)                   Impression:      No fluid collection.      Electronically signed by: Kevin Johnson  Date:    12/28/2024  Time:    19:19               Narrative:    EXAMINATION:  US SOFT TISSUE, LOWER EXTREMITY, RIGHT    CLINICAL HISTORY:  foot infection;    TECHNIQUE:  Focused ultrasound of the right foot soft tissues was performed in the area of palpable abnormality at the head of the 4th metatarsal.    COMPARISON:  None    FINDINGS:  There is mild edema within the soft tissues.  There is no focal fluid collection or other sonographic abnormality.                                       X-Ray Foot Complete Right (Final result)  Result time 12/28/24 19:04:36      Final result by Kevin Johnson DO (12/28/24 19:04:36)                   Impression:      1.0 cm linear metallic foreign body within the plantar soft tissues.      Electronically signed by: Kevin Johnson  Date:    12/28/2024  Time:    19:04               Narrative:    EXAMINATION:  XR FOOT COMPLETE 3 VIEW RIGHT    CLINICAL HISTORY:  . Pain in right foot    TECHNIQUE:  AP, lateral, and oblique views of the right foot were performed.    COMPARISON:  None    FINDINGS:  There is a 1.0 cm linear metallic foreign body within the plantar soft tissues between the 3rd and 4th metatarsal heads.  Several additional  smaller densities are also noted in the soft tissues, which may represent dystrophic calcifications or additional foreign bodies.  There is no acute fracture or dislocation.  Alignment is normal.  Joint spaces are preserved.                                       X-Ray Chest 1 View (Final result)  Result time 12/28/24 18:35:57      Final result by Kevin Johnson DO (12/28/24 18:35:57)                   Impression:      Cardiomegaly with pulmonary vascular congestion and mild perihilar edema.      Electronically signed by: Kevin Johnson  Date:    12/28/2024  Time:    18:35               Narrative:    EXAMINATION:  XR CHEST 1 VIEW    CLINICAL HISTORY:  Dyspnea, unspecified    TECHNIQUE:  Single frontal view of the chest was performed.    COMPARISON:  02/03/2023.    FINDINGS:  The lungs are well expanded.  There is pulmonary vascular digestion and there is perihilar interstitial edema.  There is no new focal consolidation.  The pleural spaces are clear.  The cardiac silhouette is enlarged.  There are calcifications of the aortic arch.  Osseous structures are intact

## 2025-01-01 NOTE — PROGRESS NOTES
[facility] Medicine  Progress Note    Patient Name: Renard Jimenez  MRN: [id number]  Patient Class: IP- Inpatient   Admission Date: 12/28/2024  Length of Stay: 3 days  Attending Physician: Ronnell Kate MD  Primary Care Provider: Juancho Ortiz MD        Subjective     Principal Problem:Acute on chronic respiratory failure with hypoxia and hypercapnia        HPI:  59 year old male with comorbid conditions of COPD on 1.5 lpm home oxygen, HFpEF, HLD, hypertension, h/o MI presented to [facility] due to shortness of breath.  Per EMS was saturating 64% on 1.5lpm nasal cannula, was placed on additional oxygen and brought to ED.  Per wife patient was also complaining of right foot pain after stepping on a nail 1 week prior.  Denies chest pain, fevers, nausea/vomiting, diarrhea.   On evaluation patient has crackles bilaterally, edema in extremities. BNP is 676. CXR revealed pulmonary vascular congestion.  Was given 40mg IV lasix with over 2 liter output.  Right foot noted to be erythematous with purulent drainage, U/S extremity did not reveal fluid collection.  X-ray foot revealed 1.0cm metallic foreign body within plantar soft tissue.  Started on vancomycin and zosyn.  Podiatry notified.  Patient was started on nicardipine infusion for blood pressure reaching > .    Blood gas revealed hypercapnia for which patient was started on biPAP.  Also given IV solumedrol, magnesium. Patient was transferred to [facility] for continuous bipap requirement.      Overview/Hospital Course:  59 year old male with comorbid conditions of COPD on 1.5 lpm home oxygen, HFpEF, HLD, hypertension, h/o MI presented to [facility] due to shortness of breath.  Per EMS was saturating 64% on 1.5lpm nasal cannula, was placed on additional oxygen and brought to ED.  Per wife patient was also complaining of right foot pain after stepping on a nail 1 week prior.  Denies chest pain, fevers, nausea/vomiting, diarrhea.   On evaluation  patient has crackles bilaterally, edema in extremities. BNP is 676. CXR revealed pulmonary vascular congestion.  Was given 40mg IV lasix with over 2 liter output.  Right foot noted to be erythematous with purulent drainage, U/S extremity did not reveal fluid collection.  X-ray foot revealed 1.0cm metallic foreign body within plantar soft tissue.  Started on vancomycin and zosyn.  Podiatry consulted with us and performed bedside removal of the foreign body under moderate sedation.  Patient was started on nicardipine infusion for blood pressure reaching > .  He was weaned off of that when the BP improved.  Blood gas revealed hypercapnia for which patient was started on biPAP.  Also given IV solumedrol, magnesium. Patient was transferred to [facility] for continuous bipap requirement.  We were able to wean him from BiPAP.  ABG's began to show an appropriate rise in serum bicarb to help normalize the pH.  The foot grew MRSA.  He was continued on vancomycin.    Interval History:  able to stay off of BiPAP.  Arterial pH is within good range.  The S.aureus in the foot is oxacillin resistant.    Review of Systems   Constitutional:  Negative for fever.   Cardiovascular:  Negative for chest pain.   Gastrointestinal:  Negative for abdominal pain.     Objective:     Vital Signs (Most Recent):  Temp: 97.8 °F (36.6 °C) (12/31/24 1515)  Pulse: 70 (12/31/24 1913)  Resp: (!) 29 (12/31/24 1913)  BP: (!) 129/91 (12/31/24 1800)  SpO2: 95 % (12/31/24 1913) Vital Signs (24h Range):  Temp:  [96.8 °F (36 °C)-98.7 °F (37.1 °C)] 97.8 °F (36.6 °C)  Pulse:  [65-83] 70  Resp:  [18-42] 29  SpO2:  [87 %-98 %] 95 %  BP: (122-196)/(60-95) 129/91     Weight: (!) 139.4 kg (307 lb 5.1 oz)  Body mass index is 44.73 kg/m².    Intake/Output Summary (Last 24 hours) at 12/31/2024 1936  Last data filed at 12/31/2024 1750  Gross per 24 hour   Intake 2118.91 ml   Output 2050 ml   Net 68.91 ml         Physical Exam  Vitals reviewed.   Constitutional:        General: He is not in acute distress.     Appearance: He is not diaphoretic.      Interventions: Nasal cannula in place.   HENT:      Mouth/Throat:      Mouth: Mucous membranes are moist.   Eyes:      General: No scleral icterus.        Right eye: No discharge.         Left eye: No discharge.   Neck:      Vascular: No JVD.   Cardiovascular:      Rate and Rhythm: Normal rate and regular rhythm.   Pulmonary:      Effort: Pulmonary effort is normal.      Breath sounds: Normal breath sounds. Decreased air movement present.   Abdominal:      General: Bowel sounds are normal. There is no distension.      Palpations: Abdomen is soft.      Tenderness: There is no abdominal tenderness.   Skin:     General: Skin is warm.      Findings: No rash.   Neurological:      Mental Status: He is alert.             Significant Labs: All pertinent labs within the past 24 hours have been reviewed.    Significant Imaging: I have reviewed all pertinent imaging results/findings within the past 24 hours.    Assessment and Plan     * Acute on chronic respiratory failure with hypoxia and hypercapnia  Patient with Hypercapnic and Hypoxic Respiratory failure which is Acute on chronic.  he is on home oxygen at 1.5 LPM.   Currently, we have him on low flow nasal cannula.  Weaned off the NIPPV.    .   Signs/symptoms of respiratory failure include- increased work of breathing and respiratory distress. Contributing diagnoses includes - CHF and COPD Labs and images were reviewed. Patient Has recent ABG, which has been reviewed.  His serum bicarbonate is appropriately elevated for the degree of hypercapnia, and, thus, the pH is now normal.  I considered running NS to help increase bicarbonate excretion, but he may develop a metabolic acidosis if that were to happen.    Hyponatremia  Hyponatremia is likely due to Heart Failure. The patient's most recent sodium results are listed below.  Recent Labs     12/30/24  0453 12/31/24  0400    137  "      Plan  - Monitor sodium Daily.   - Patient hyponatremia is improving    Morbid obesity  Body mass index is 44.73 kg/m². Morbid obesity complicates all aspects of disease management from diagnostic modalities to treatment. Weight loss encouraged and health benefits explained to patient.         Right foot infection  MRSA growing.  Continue current abx.  Monitor for improvement.  Appreciate podiatry's help.      Puncture wound of right foot with foreign body  Podiatry managing.  Removal of metal foreign body done.      Acute on chronic heart failure with preserved ejection fraction  Patient presented with respiratory failure, edema in lower extremities.  Urinary output of 2.9L after 40mg lasix with improvement of dyspnea. Currently has minimal fluid in lower extremities.  Suspect exacerbation of heart failure in the setting of cellulitis.      Received IV Lasix.  Has a high serum bicarbonate concentration, so I stopped the Lasix.  Continue GDMT  Telemetry  Repeat ECHO done = 50 to 55% EF    Mixed hyperlipidemia  Continue statin       Essential hypertension  Patient's blood pressure range in the last 24 hours was: BP  Min: 122/68  Max: 196/95.The patient's inpatient anti-hypertensive regimen is listed below:  Current Antihypertensives  , Daily, Oral  , Daily, Oral  losartan tablet 50 mg, Daily, Oral  metoprolol tartrate (LOPRESSOR) tablet 25 mg, 2 times daily, Oral  lisinopriL tablet 20 mg, Daily, Oral    Plan  - BP is controlled, no changes needed to their regimen    Chronic obstructive pulmonary disease with acute exacerbation  Patient's COPD is with exacerbation noted by continued dyspnea currently.  Patient is currently off COPD Pathway. Continue scheduled inhalers  , Duonebs PRN  and monitor respiratory status closely.       VTE Risk Mitigation (From admission, onward)           Ordered     enoxaparin injection 40 mg  Every 12 hours        Note to Pharmacy: Ht: 5' 9.5" (1.765 m)  Wt: (!) 140.9 kg (310 lb 10.1 " oz)  Estimated Creatinine Clearance: 138.2 mL/min (based on SCr of 0.8 mg/dL).  Body mass index is 45.21 kg/m².    12/29/24 0024     IP VTE HIGH RISK PATIENT  Once         12/29/24 0013     Place sequential compression device  Until discontinued         12/29/24 0013                    Discharge Planning   TANIA: 1/2/2025     Code Status: Full Code   Medical Readiness for Discharge Date:   Discharge Plan A: Home with family              Ronnell Kate MD  Department of Hospital Medicine   [facility]

## 2025-01-01 NOTE — ASSESSMENT & PLAN NOTE
Patient with Hypercapnic and Hypoxic Respiratory failure which is Acute on chronic.  he is on home oxygen at 1.5 LPM.   Currently, we have him on low flow nasal cannula.  Weaned off the NIPPV.    .   Signs/symptoms of respiratory failure include- increased work of breathing and respiratory distress. Contributing diagnoses includes - CHF and COPD Labs and images were reviewed. Patient Has recent ABG, which has been reviewed.  His serum bicarbonate is appropriately elevated for the degree of hypercapnia, and, thus, the pH is now normal.  I considered running NS to help increase bicarbonate excretion, but he may develop a metabolic acidosis if that were to happen.

## 2025-01-01 NOTE — PT/OT/SLP PROGRESS
Occupational Therapy      Patient Name:  Renard Jimenez   MRN:  [id number]    Patient not seen today secondary to  (Therapist Unavailable.). Will follow-up 1/2/2025.    1/1/2025

## 2025-01-01 NOTE — PROGRESS NOTES
[facility]  Progress Note    Patient Name: Renard Jimenez  MRN: [id number]  Patient Class: IP- Inpatient   Admission Date: 12/28/2024  Length of Stay: 4 days  Attending Physician: Rudy Suarez DO  Primary Care Provider: Juancho Ortiz MD        Subjective     Principal Problem:Acute on chronic respiratory failure with hypoxia and hypercapnia        HPI:  59 year old male with comorbid conditions of COPD on 1.5 lpm home oxygen, HFpEF, HLD, hypertension, h/o MI presented to [facility] due to shortness of breath.  Per EMS was saturating 64% on 1.5lpm nasal cannula, was placed on additional oxygen and brought to ED.  Per wife patient was also complaining of right foot pain after stepping on a nail 1 week prior.  Denies chest pain, fevers, nausea/vomiting, diarrhea.   On evaluation patient has crackles bilaterally, edema in extremities. BNP is 676. CXR revealed pulmonary vascular congestion.  Was given 40mg IV lasix with over 2 liter output.  Right foot noted to be erythematous with purulent drainage, U/S extremity did not reveal fluid collection.  X-ray foot revealed 1.0cm metallic foreign body within plantar soft tissue.  Started on vancomycin and zosyn.  Podiatry notified.  Patient was started on nicardipine infusion for blood pressure reaching > .    Blood gas revealed hypercapnia for which patient was started on biPAP.  Also given IV solumedrol, magnesium. Patient was transferred to [facility] for continuous bipap requirement.      Overview/Hospital Course:  59 year old male with comorbid conditions of COPD on 1.5 lpm home oxygen, HFpEF, HLD, hypertension, h/o MI presented to [facility] due to shortness of breath.  Per EMS was saturating 64% on 1.5lpm nasal cannula, was placed on additional oxygen and brought to ED.  Per wife patient was also complaining of right foot pain after stepping on a nail 1 week prior.  Denies chest pain, fevers, nausea/vomiting, diarrhea.   On evaluation  patient has crackles bilaterally, edema in extremities. BNP is 676. CXR revealed pulmonary vascular congestion.  Was given 40mg IV lasix with over 2 liter output.  Right foot noted to be erythematous with purulent drainage, U/S extremity did not reveal fluid collection.  X-ray foot revealed 1.0cm metallic foreign body within plantar soft tissue.  Started on vancomycin and zosyn.  Podiatry consulted with us and performed bedside removal of the foreign body under moderate sedation.  Patient was started on nicardipine infusion for blood pressure reaching > .  He was weaned off of that when the BP improved.  Blood gas revealed hypercapnia for which patient was started on biPAP.  Also given IV solumedrol, magnesium. Patient was transferred to [facility] for continuous bipap requirement.  We were able to wean him from BiPAP.  ABG's began to show an appropriate rise in serum bicarb to help normalize the pH.  The foot grew MRSA.  He was continued on vancomycin.    Interval History:  Patient was seen and examined at bedside this morning.  He is doing much better today.  Breathing is much improved.  Currently on 3 L O2, baseline 1.5 L at home.  Remains on vancomycin.  Hemodynamically stable.  We will consult PT/OT.  Feli to transfer from ICU.    Review of Systems   Constitutional:  Negative for fever.   Cardiovascular:  Negative for chest pain.   Gastrointestinal:  Negative for abdominal pain.     Objective:     Vital Signs (Most Recent):  Temp: 97.8 °F (36.6 °C) (01/01/25 1101)  Pulse: 68 (01/01/25 1100)  Resp: (!) 21 (01/01/25 1100)  BP: 121/67 (01/01/25 1100)  SpO2: (!) 91 % (01/01/25 1100) Vital Signs (24h Range):  Temp:  [97.1 °F (36.2 °C)-97.8 °F (36.6 °C)] 97.8 °F (36.6 °C)  Pulse:  [68-82] 68  Resp:  [18-34] 21  SpO2:  [89 %-97 %] 91 %  BP: (121-181)/(65-91) 121/67     Weight: (!) 139.4 kg (307 lb 5.1 oz)  Body mass index is 44.73 kg/m².    Intake/Output Summary (Last 24 hours) at 1/1/2025 1233  Last data filed at  1/1/2025 1100  Gross per 24 hour   Intake 3110.93 ml   Output 3725 ml   Net -614.07 ml         Physical Exam  Vitals reviewed.   Constitutional:       General: He is not in acute distress.     Appearance: He is not diaphoretic.      Interventions: Nasal cannula in place.   HENT:      Mouth/Throat:      Mouth: Mucous membranes are moist.   Eyes:      General: No scleral icterus.        Right eye: No discharge.         Left eye: No discharge.   Neck:      Vascular: No JVD.   Cardiovascular:      Rate and Rhythm: Normal rate and regular rhythm.   Pulmonary:      Effort: Pulmonary effort is normal.      Breath sounds: Normal breath sounds. Decreased air movement present.   Abdominal:      General: Bowel sounds are normal. There is no distension.      Palpations: Abdomen is soft.      Tenderness: There is no abdominal tenderness.   Skin:     General: Skin is warm.      Findings: No rash.   Neurological:      Mental Status: He is alert.             Significant Labs: All pertinent labs within the past 24 hours have been reviewed.  CBC:   Recent Labs   Lab 12/31/24  0400 01/01/25  0400   WBC 9.01 8.25   HGB 15.3 14.9   HCT 49.2 48.1    220     CMP:   Recent Labs   Lab 12/31/24  0400 01/01/25  0400    132*   K 4.1 4.7   CL 89* 88*   CO2 42* 39*   GLU 92 103   BUN 19 15   CREATININE 0.9 0.9   CALCIUM 8.9 9.2   ANIONGAP 6* 5*       Significant Imaging: I have reviewed all pertinent imaging results/findings within the past 24 hours.  Imaging Results              US Soft Tissue, Lower Extremity, Right (Final result)  Result time 12/28/24 19:19:53      Final result by Kevin Johnson DO (12/28/24 19:19:53)                   Impression:      No fluid collection.      Electronically signed by: Kevin Johnson  Date:    12/28/2024  Time:    19:19               Narrative:    EXAMINATION:  US SOFT TISSUE, LOWER EXTREMITY, RIGHT    CLINICAL HISTORY:  foot infection;    TECHNIQUE:  Focused ultrasound of the right foot soft  tissues was performed in the area of palpable abnormality at the head of the 4th metatarsal.    COMPARISON:  None    FINDINGS:  There is mild edema within the soft tissues.  There is no focal fluid collection or other sonographic abnormality.                                       X-Ray Foot Complete Right (Final result)  Result time 12/28/24 19:04:36      Final result by Kevin Johnson DO (12/28/24 19:04:36)                   Impression:      1.0 cm linear metallic foreign body within the plantar soft tissues.      Electronically signed by: Kevin Johnson  Date:    12/28/2024  Time:    19:04               Narrative:    EXAMINATION:  XR FOOT COMPLETE 3 VIEW RIGHT    CLINICAL HISTORY:  . Pain in right foot    TECHNIQUE:  AP, lateral, and oblique views of the right foot were performed.    COMPARISON:  None    FINDINGS:  There is a 1.0 cm linear metallic foreign body within the plantar soft tissues between the 3rd and 4th metatarsal heads.  Several additional smaller densities are also noted in the soft tissues, which may represent dystrophic calcifications or additional foreign bodies.  There is no acute fracture or dislocation.  Alignment is normal.  Joint spaces are preserved.                                       X-Ray Chest 1 View (Final result)  Result time 12/28/24 18:35:57      Final result by Kevin Johnson DO (12/28/24 18:35:57)                   Impression:      Cardiomegaly with pulmonary vascular congestion and mild perihilar edema.      Electronically signed by: Kevin Johnson  Date:    12/28/2024  Time:    18:35               Narrative:    EXAMINATION:  XR CHEST 1 VIEW    CLINICAL HISTORY:  Dyspnea, unspecified    TECHNIQUE:  Single frontal view of the chest was performed.    COMPARISON:  02/03/2023.    FINDINGS:  The lungs are well expanded.  There is pulmonary vascular digestion and there is perihilar interstitial edema.  There is no new focal consolidation.  The pleural spaces are clear.  The  cardiac silhouette is enlarged.  There are calcifications of the aortic arch.  Osseous structures are intact                                        Assessment and Plan     * Acute on chronic respiratory failure with hypoxia and hypercapnia  Patient with Hypercapnic and Hypoxic Respiratory failure which is Acute on chronic.  he is on home oxygen at 1.5 LPM.   Currently, we have him on low flow nasal cannula.  Weaned off the NIPPV.    .   Signs/symptoms of respiratory failure include- increased work of breathing and respiratory distress. Contributing diagnoses includes - CHF and COPD Labs and images were reviewed. Patient Has recent ABG, which has been reviewed.     Breathing much improved   Currently on 3 L O2 via nasal cannula   Hemodynamically stable   Okay to transfer from ICU    Hyponatremia  Hyponatremia is likely due to Heart Failure. The patient's most recent sodium results are listed below.  Recent Labs     12/30/24  0453 12/31/24  0400    137       Plan  - Monitor sodium Daily.   - Patient hyponatremia is improving    Morbid obesity  Body mass index is 44.73 kg/m². Morbid obesity complicates all aspects of disease management from diagnostic modalities to treatment. Weight loss encouraged and health benefits explained to patient.         Right foot infection  MRSA growing.  Continue current abx.  Monitor for improvement.  Appreciate podiatry's help.  Continue vancomycin      Puncture wound of right foot with foreign body  Podiatry managing.  Removal of metal foreign body done.      Acute on chronic heart failure with preserved ejection fraction  Patient presented with respiratory failure, edema in lower extremities.  Urinary output of 2.9L after 40mg lasix with improvement of dyspnea. Currently has minimal fluid in lower extremities.  Suspect exacerbation of heart failure in the setting of cellulitis.      Received IV Lasix.  Has a high serum bicarbonate concentration, so I stopped the Lasix.  Continue  "GDMT  Telemetry  Repeat ECHO done = 50 to 55% EF  Breathing much improved    Mixed hyperlipidemia  Continue statin       Essential hypertension  Patient's blood pressure range in the last 24 hours was: BP  Min: 122/68  Max: 196/95.The patient's inpatient anti-hypertensive regimen is listed below:  Current Antihypertensives  , Daily, Oral  , Daily, Oral  losartan tablet 50 mg, Daily, Oral  metoprolol tartrate (LOPRESSOR) tablet 25 mg, 2 times daily, Oral  lisinopriL tablet 20 mg, Daily, Oral    Plan  - BP is controlled, no changes needed to their regimen    Chronic obstructive pulmonary disease with acute exacerbation  Patient's COPD is with exacerbation noted by continued dyspnea currently.  Patient is currently off COPD Pathway. Continue scheduled inhalers  , Duonebs PRN  and monitor respiratory status closely.       VTE Risk Mitigation (From admission, onward)           Ordered     enoxaparin injection 40 mg  Every 12 hours        Note to Pharmacy: Ht: 5' 9.5" (1.765 m)  Wt: (!) 140.9 kg (310 lb 10.1 oz)  Estimated Creatinine Clearance: 138.2 mL/min (based on SCr of 0.8 mg/dL).  Body mass index is 45.21 kg/m².    12/29/24 0024     IP VTE HIGH RISK PATIENT  Once         12/29/24 0013     Place sequential compression device  Until discontinued         12/29/24 0013                    Discharge Planning   TANIA: 1/5/2025     Code Status: Full Code   Medical Readiness for Discharge Date:   Discharge Plan A: Home with family            Critical care time spent on the evaluation and treatment of severe organ dysfunction, review of pertinent labs and imaging studies, discussions with consulting providers and discussions with patient/family: 35 minutes.    Please place Justification for DME        Rudy Suarez DO  Department of Hospital Medicine   [facility]    "

## 2025-01-01 NOTE — ASSESSMENT & PLAN NOTE
Patient presented with respiratory failure, edema in lower extremities.  Urinary output of 2.9L after 40mg lasix with improvement of dyspnea. Currently has minimal fluid in lower extremities.  Suspect exacerbation of heart failure in the setting of cellulitis.      Received IV Lasix.  Has a high serum bicarbonate concentration, so I stopped the Lasix.  Continue GDMT  Telemetry  Repeat ECHO done = 50 to 55% EF  Breathing much improved

## 2025-01-01 NOTE — ASSESSMENT & PLAN NOTE
Patient with Hypercapnic and Hypoxic Respiratory failure which is Acute on chronic.  he is on home oxygen at 1.5 LPM.   Currently, we have him on low flow nasal cannula.  Weaned off the NIPPV.    .   Signs/symptoms of respiratory failure include- increased work of breathing and respiratory distress. Contributing diagnoses includes - CHF and COPD Labs and images were reviewed. Patient Has recent ABG, which has been reviewed.     Breathing much improved   Currently on 3 L O2 via nasal cannula   Hemodynamically stable   Okay to transfer from ICU

## 2025-01-01 NOTE — PLAN OF CARE
Goals to be met by: 25     Patient will increase functional independence with mobility by performin. Supine to sit with Supervision  2. Sit to stand transfer with Supervision  3. Bed to chair transfer with Supervision using Rolling Walker vs Rollator  4. Gait  x 300 feet with Supervision using Rolling Walker vs Rollator

## 2025-01-01 NOTE — NURSING
SHIFT NOTE:  Patient AAOx4.  O2 sat 90-95% on 3 L NC with humidification. Pt tachypneic with RR 20-29.  SR with HR 70's-80's.  Tmax 97.8 orally.  UOP 2450 ml via urinal. Urine clear/yellow in color.    PRN   Magnesium 800 mg (2nd dose) administered @ 2116 for Mg 1.6 on 12/31.    Magnesium 800 mg administered @ 0534 for Mg 1.8.  Magnesium 800 mg (2nd dose) scheduled @ 0900 to replace Mg 1.8.    Patient received CHG bath. Linen changed. Tubing changed, labeled, and capped as needed. Bed alarm set. Bed in lowest position. Call light within reach.

## 2025-01-01 NOTE — ASSESSMENT & PLAN NOTE
Body mass index is 44.73 kg/m². Morbid obesity complicates all aspects of disease management from diagnostic modalities to treatment. Weight loss encouraged and health benefits explained to patient.

## 2025-01-01 NOTE — ASSESSMENT & PLAN NOTE
MRSA growing.  Continue current abx.  Monitor for improvement.  Appreciate podiatry's help.  Continue vancomycin

## 2025-01-01 NOTE — CARE UPDATE
12/31/24 1910   Patient Assessment/Suction   Level of Consciousness (AVPU) alert   Respiratory Effort Normal;Unlabored   Expansion/Accessory Muscles/Retractions no use of accessory muscles   All Lung Fields Breath Sounds equal bilaterally;diminished   Rhythm/Pattern, Respiratory unlabored   Cough Frequency infrequent   Skin Integrity   $ Wound Care Tech Time 15 min   Area Observed Left;Right;Behind ear   Skin Appearance without discoloration   PRE-TX-O2   Device (Oxygen Therapy) nasal cannula with humidification   Flow (L/min) (Oxygen Therapy) 3   SpO2 (!) 92 %   Pulse Oximetry Type Continuous   $ Pulse Oximetry - Multiple Charge Pulse Oximetry - Multiple   Pulse 71   Resp 18   Aerosol Therapy   $ Aerosol Therapy Charges Aerosol Treatment  (Duoneb)   Daily Review of Necessity (SVN) completed   Respiratory Treatment Status (SVN) given   Treatment Route (SVN) mask   Patient Position HOB elevated   Post Treatment Assessment (SVN) breath sounds unchanged   Signs of Intolerance (SVN) none   Breath Sounds Post-Respiratory Treatment   Throughout All Fields Post-Treatment All Fields   Throughout All Fields Post-Treatment no change   Post-treatment Heart Rate (beats/min) 71   Post-treatment Resp Rate (breaths/min) 18   Education   $ Education BiPAP;Bronchodilator;15 min

## 2025-01-01 NOTE — PT/OT/SLP EVAL
Physical Therapy Evaluation    Patient Name:  Renard Jimenez   MRN:  [id number]    Recommendations:     Discharge Recommendations: Low Intensity Therapy   Discharge Equipment Recommendations: rollator (vs rolling walker if pt unable to pay co-pay on rollator)   Barriers to discharge:  medical needs    Assessment:     Renard Jimenez is a 59 y.o. male admitted with a medical diagnosis of Acute on chronic respiratory failure with hypoxia and hypercapnia.  He presents with the following impairments/functional limitations: gait instability, decreased safety awareness, impaired cardiopulmonary response to activity, orthopedic precautions.  Pt agreeable to PT session, but was very impulsive/demonstrated poor safety awareness and was not open to safety instructions regarding use of RW. PT feels pt would benefit from use of a rollator at home.  Renard's mobility limitation cannot be sufficiently resolved by the use of a cane. His functional mobility deficit can be sufficiently resolved with the use of a Rollator. Patient's mobility limitation significantly impairs their ability to participate in one of more activities of daily living.  The use of a Rollator will significantly improve the patient's ability to participate in MRADLS and the patient will use it on regular basis in the home.   If, however, pt unable to afford co-pay on rollator he would benefit from use of rolling walker instead.      Rehab Prognosis: Fair and Poor; patient would benefit from acute skilled PT services to address these deficits and reach maximum level of function.    Recent Surgery: * No surgery found *      Plan:     During this hospitalization, patient to be seen 5 x/week to address the identified rehab impairments via gait training, therapeutic activities, therapeutic exercises and progress toward the following goals:    Plan of Care Expires:  01/29/25    Subjective     Chief Complaint: R foot discomfort during last 75 feet of gait  training  Patient/Family Comments/goals: home with wife to assist  Pain/Comfort:  Pain Rating 1:  (not rated)  Location - Side 1: Right  Location 1: foot  Pain Addressed 1: Reposition, Distraction, Cessation of Activity, Nurse notified    Patients cultural, spiritual, Religion conflicts given the current situation:      Living Environment:  Pt lives with his wife in a trailer.  Prior to admission, patients level of function was MI with cane and O2.  Equipment used at home: cane, straight, nebulizer, oxygen.  DME owned (not currently used): none.  Upon discharge, patient will have assistance from his wife.    Objective:     Communicated with NIMCO Foss prior to and after session.  Patient found HOB elevated with blood pressure cuff, oxygen, peripheral IV, telemetry, pulse ox (continuous)  upon PT entry to room.    General Precautions: Standard, hearing impaired, fall, respiratory  Orthopedic Precautions:RLE weight bearing as tolerated   Braces: N/A  Respiratory Status: Nasal cannula, flow 4 L/min(used 1.5Lpm at home prior to admit)    Exams:  Cognitive Exam:  Patient is oriented to Person, Place, Time, and Situation  RLE ROM: WFL  RLE Strength: NT  LLE ROM: WFL  LLE Strength: NT    Functional Mobility:  Bed Mobility:     Scooting: modified independence  Supine to Sit: modified independence  Transfers:     Sit to Stand:  stand by assistance with rolling walker  Gait: x 300' with rolling walker and CGA for safety and PT verbally cueing pt to keep his weight on his R heel and remain inside RW base, but pt ignored PT/was non-compliant with safety instructions. PT unable to obtain a accurate SPO2 reading due to pt frustration and wanting to eat/shooing PT out of the room(waveform not indicating accurate reading).      AM-PAC 6 CLICK MOBILITY  Total Score:21       Treatment & Education:  Pt was educated on the following: call light use, importance of OOB activity and functional mobility to negate the negative effects of  prolonged bed rest during this hospitalization, safe transfers/ambulation and discharge planning recommendations/options.      Patient left sitting edge of bed with all lines intact, call button in reach, RN notified, and pt eating lunch with tray table in front of him .    GOALS:   Multidisciplinary Problems       Physical Therapy Goals          Problem: Physical Therapy    Goal Priority Disciplines Outcome Interventions   Physical Therapy Goal     PT, PT/OT     Description: Goals to be met by: 25     Patient will increase functional independence with mobility by performin. Supine to sit with Supervision  2. Sit to stand transfer with Supervision  3. Bed to chair transfer with Supervision using Rolling Walker vs Rollator  4. Gait  x 300 feet with Supervision using Rolling Walker vs Rollator                              History:     Past Medical History:   Diagnosis Date    CHF (congestive heart failure)     COPD (chronic obstructive pulmonary disease)     HLD (hyperlipidemia)     Hypertension     MI, old        Past Surgical History:   Procedure Laterality Date    CAROTID ENDARTERECTOMY Left 2022    Procedure: ENDARTERECTOMY-CAROTID;  Surgeon: ELIDA Guadalupe II, MD;  Location: [address];  Service: Vascular;  Laterality: Left;    LAPAROSCOPIC CHOLECYSTECTOMY N/A 2022    Procedure: CHOLECYSTECTOMY, LAPAROSCOPIC;  Surgeon: Neville Hutchinson MD;  Location: [facility];  Service: General;  Laterality: N/A;    TONSILLECTOMY         Time Tracking:     PT Received On: 25  PT Start Time: 1217     PT Stop Time: 1237  PT Total Time (min): 20 min     Billable Minutes: Evaluation 10 and Gait Training 10      2025

## 2025-01-02 VITALS
OXYGEN SATURATION: 93 % | DIASTOLIC BLOOD PRESSURE: 56 MMHG | RESPIRATION RATE: 18 BRPM | HEIGHT: 70 IN | WEIGHT: 307.31 LBS | TEMPERATURE: 99 F | BODY MASS INDEX: 43.99 KG/M2 | HEART RATE: 80 BPM | SYSTOLIC BLOOD PRESSURE: 119 MMHG

## 2025-01-02 PROBLEM — E87.1 HYPONATREMIA: Status: RESOLVED | Noted: 2024-12-30 | Resolved: 2025-01-02

## 2025-01-02 PROBLEM — J96.22 ACUTE ON CHRONIC RESPIRATORY FAILURE WITH HYPOXIA AND HYPERCAPNIA: Status: RESOLVED | Noted: 2024-12-29 | Resolved: 2025-01-02

## 2025-01-02 PROBLEM — J44.9 COPD (CHRONIC OBSTRUCTIVE PULMONARY DISEASE): Status: ACTIVE | Noted: 2025-01-02

## 2025-01-02 PROBLEM — J96.21 ACUTE ON CHRONIC RESPIRATORY FAILURE WITH HYPOXIA AND HYPERCAPNIA: Status: RESOLVED | Noted: 2024-12-29 | Resolved: 2025-01-02

## 2025-01-02 PROBLEM — I50.33 ACUTE ON CHRONIC HEART FAILURE WITH PRESERVED EJECTION FRACTION: Status: RESOLVED | Noted: 2022-11-09 | Resolved: 2025-01-02

## 2025-01-02 LAB
ANION GAP SERPL CALC-SCNC: 6 MMOL/L (ref 8–16)
BASOPHILS # BLD AUTO: 0.03 K/UL (ref 0–0.2)
BASOPHILS NFR BLD: 0.4 % (ref 0–1.9)
BUN SERPL-MCNC: 21 MG/DL (ref 6–20)
CALCIUM SERPL-MCNC: 9.4 MG/DL (ref 8.7–10.5)
CHLORIDE SERPL-SCNC: 91 MMOL/L (ref 95–110)
CO2 SERPL-SCNC: 39 MMOL/L (ref 23–29)
CREAT SERPL-MCNC: 1 MG/DL (ref 0.5–1.4)
CRP SERPL-MCNC: 1.5 MG/DL
DIFFERENTIAL METHOD BLD: ABNORMAL
EOSINOPHIL # BLD AUTO: 0 K/UL (ref 0–0.5)
EOSINOPHIL NFR BLD: 0 % (ref 0–8)
ERYTHROCYTE [DISTWIDTH] IN BLOOD BY AUTOMATED COUNT: 14 % (ref 11.5–14.5)
EST. GFR  (NO RACE VARIABLE): >60 ML/MIN/1.73 M^2
GLUCOSE SERPL-MCNC: 83 MG/DL (ref 70–110)
HCT VFR BLD AUTO: 51.1 % (ref 40–54)
HGB BLD-MCNC: 15.6 G/DL (ref 14–18)
IMM GRANULOCYTES # BLD AUTO: 0.02 K/UL (ref 0–0.04)
IMM GRANULOCYTES NFR BLD AUTO: 0.3 % (ref 0–0.5)
LYMPHOCYTES # BLD AUTO: 1.1 K/UL (ref 1–4.8)
LYMPHOCYTES NFR BLD: 14.6 % (ref 18–48)
MAGNESIUM SERPL-MCNC: 2.2 MG/DL (ref 1.6–2.6)
MCH RBC QN AUTO: 31.7 PG (ref 27–31)
MCHC RBC AUTO-ENTMCNC: 30.5 G/DL (ref 32–36)
MCV RBC AUTO: 104 FL (ref 82–98)
MONOCYTES # BLD AUTO: 0.7 K/UL (ref 0.3–1)
MONOCYTES NFR BLD: 9.3 % (ref 4–15)
NEUTROPHILS # BLD AUTO: 5.6 K/UL (ref 1.8–7.7)
NEUTROPHILS NFR BLD: 75.4 % (ref 38–73)
NRBC BLD-RTO: 0 /100 WBC
PHOSPHATE SERPL-MCNC: 4.6 MG/DL (ref 2.7–4.5)
PLATELET # BLD AUTO: 232 K/UL (ref 150–450)
PMV BLD AUTO: 10 FL (ref 9.2–12.9)
POTASSIUM SERPL-SCNC: 4.7 MMOL/L (ref 3.5–5.1)
POTASSIUM SERPL-SCNC: 5.4 MMOL/L (ref 3.5–5.1)
RBC # BLD AUTO: 4.92 M/UL (ref 4.6–6.2)
SODIUM SERPL-SCNC: 136 MMOL/L (ref 136–145)
VANCOMYCIN TROUGH SERPL-MCNC: 16.9 UG/ML
WBC # BLD AUTO: 7.45 K/UL (ref 3.9–12.7)

## 2025-01-02 PROCEDURE — 86140 C-REACTIVE PROTEIN: CPT | Performed by: HOSPITALIST

## 2025-01-02 PROCEDURE — 94761 N-INVAS EAR/PLS OXIMETRY MLT: CPT

## 2025-01-02 PROCEDURE — 80202 ASSAY OF VANCOMYCIN: CPT | Performed by: STUDENT IN AN ORGANIZED HEALTH CARE EDUCATION/TRAINING PROGRAM

## 2025-01-02 PROCEDURE — 84132 ASSAY OF SERUM POTASSIUM: CPT | Performed by: HOSPITALIST

## 2025-01-02 PROCEDURE — 25000003 PHARM REV CODE 250: Performed by: STUDENT IN AN ORGANIZED HEALTH CARE EDUCATION/TRAINING PROGRAM

## 2025-01-02 PROCEDURE — 36415 COLL VENOUS BLD VENIPUNCTURE: CPT | Performed by: HOSPITALIST

## 2025-01-02 PROCEDURE — 94640 AIRWAY INHALATION TREATMENT: CPT

## 2025-01-02 PROCEDURE — 63600175 PHARM REV CODE 636 W HCPCS: Performed by: STUDENT IN AN ORGANIZED HEALTH CARE EDUCATION/TRAINING PROGRAM

## 2025-01-02 PROCEDURE — 27000221 HC OXYGEN, UP TO 24 HOURS

## 2025-01-02 PROCEDURE — 85025 COMPLETE CBC W/AUTO DIFF WBC: CPT | Performed by: STUDENT IN AN ORGANIZED HEALTH CARE EDUCATION/TRAINING PROGRAM

## 2025-01-02 PROCEDURE — 84100 ASSAY OF PHOSPHORUS: CPT | Performed by: STUDENT IN AN ORGANIZED HEALTH CARE EDUCATION/TRAINING PROGRAM

## 2025-01-02 PROCEDURE — 25000003 PHARM REV CODE 250: Performed by: HOSPITALIST

## 2025-01-02 PROCEDURE — 36415 COLL VENOUS BLD VENIPUNCTURE: CPT | Performed by: STUDENT IN AN ORGANIZED HEALTH CARE EDUCATION/TRAINING PROGRAM

## 2025-01-02 PROCEDURE — 80048 BASIC METABOLIC PNL TOTAL CA: CPT | Performed by: STUDENT IN AN ORGANIZED HEALTH CARE EDUCATION/TRAINING PROGRAM

## 2025-01-02 PROCEDURE — 83735 ASSAY OF MAGNESIUM: CPT | Performed by: STUDENT IN AN ORGANIZED HEALTH CARE EDUCATION/TRAINING PROGRAM

## 2025-01-02 PROCEDURE — 25000242 PHARM REV CODE 250 ALT 637 W/ HCPCS: Performed by: STUDENT IN AN ORGANIZED HEALTH CARE EDUCATION/TRAINING PROGRAM

## 2025-01-02 RX ORDER — CLINDAMYCIN HYDROCHLORIDE 300 MG/1
300 CAPSULE ORAL EVERY 6 HOURS
Qty: 32 CAPSULE | Refills: 0 | Status: SHIPPED | OUTPATIENT
Start: 2025-01-02 | End: 2025-01-10

## 2025-01-02 RX ORDER — TRAMADOL HYDROCHLORIDE 50 MG/1
50 TABLET ORAL EVERY 6 HOURS PRN
Qty: 15 TABLET | Refills: 0 | Status: SHIPPED | OUTPATIENT
Start: 2025-01-02

## 2025-01-02 RX ADMIN — BUPROPION HYDROCHLORIDE 150 MG: 150 TABLET, EXTENDED RELEASE ORAL at 08:01

## 2025-01-02 RX ADMIN — METOPROLOL TARTRATE 25 MG: 25 TABLET, FILM COATED ORAL at 08:01

## 2025-01-02 RX ADMIN — BUDESONIDE INHALATION 1 MG: 0.5 SUSPENSION RESPIRATORY (INHALATION) at 06:01

## 2025-01-02 RX ADMIN — SODIUM ZIRCONIUM CYCLOSILICATE 5 G: 5 POWDER, FOR SUSPENSION ORAL at 09:01

## 2025-01-02 RX ADMIN — ENOXAPARIN SODIUM 40 MG: 40 INJECTION SUBCUTANEOUS at 08:01

## 2025-01-02 RX ADMIN — TRAMADOL HYDROCHLORIDE 50 MG: 50 TABLET, COATED ORAL at 11:01

## 2025-01-02 RX ADMIN — ARFORMOTEROL TARTRATE 15 MCG: 15 SOLUTION RESPIRATORY (INHALATION) at 06:01

## 2025-01-02 RX ADMIN — PANTOPRAZOLE SODIUM 40 MG: 40 TABLET, DELAYED RELEASE ORAL at 05:01

## 2025-01-02 RX ADMIN — VANCOMYCIN HYDROCHLORIDE 1250 MG: 1.25 INJECTION, POWDER, LYOPHILIZED, FOR SOLUTION INTRAVENOUS at 09:01

## 2025-01-02 RX ADMIN — LOSARTAN POTASSIUM 50 MG: 50 TABLET, FILM COATED ORAL at 08:01

## 2025-01-02 RX ADMIN — IPRATROPIUM BROMIDE AND ALBUTEROL SULFATE 3 ML: .5; 3 SOLUTION RESPIRATORY (INHALATION) at 06:01

## 2025-01-02 RX ADMIN — IPRATROPIUM BROMIDE AND ALBUTEROL SULFATE 3 ML: .5; 3 SOLUTION RESPIRATORY (INHALATION) at 01:01

## 2025-01-02 RX ADMIN — MONTELUKAST 10 MG: 10 TABLET, FILM COATED ORAL at 08:01

## 2025-01-02 RX ADMIN — GABAPENTIN 300 MG: 300 CAPSULE ORAL at 08:01

## 2025-01-02 RX ADMIN — MICONAZOLE NITRATE: 20 POWDER TOPICAL at 08:01

## 2025-01-02 RX ADMIN — MUPIROCIN 1 G: 20 OINTMENT TOPICAL at 08:01

## 2025-01-02 RX ADMIN — TRAMADOL HYDROCHLORIDE 50 MG: 50 TABLET, COATED ORAL at 05:01

## 2025-01-02 NOTE — NURSING
Pt refusing to have bed alarm on. PT refusing to have staff assist to bathroom. Yelling and cursing about how he's been doing this. Tried to explain to pt that If he is to fall it will only prolong his stay in the hospital.

## 2025-01-02 NOTE — PLAN OF CARE
Scheduled TCC with PCP 1/10/25 0900 and f/u with podiatry 1/7/25 0900 01/02/25 9931   Post-Acute Status   Hospital Resources/Appts/Education Provided Appointments scheduled and added to AVS

## 2025-01-02 NOTE — CONSULTS
Wound care consult completed by Nicci Chase. Wound care team to continue to follow up as needed throughout hospital stay.

## 2025-01-02 NOTE — PROGRESS NOTES
Pharmacokinetic Assessment Follow Up: IV Vancomycin    Vancomycin serum concentration assessment(s):    The trough level was drawn correctly and can be used to guide therapy at this time. The measurement is within the desired definitive target range of 15 to 20 mcg/mL.    Vancomycin Regimen Plan:    Continue regimen to Vancomycin 1250 mg IV every 12 hours with next serum trough concentration measured at 0800 prior to 3rd dose on 1/3/25    Drug levels (last 3 results):  Recent Labs   Lab Result Units 12/31/24 2005 01/02/25  0749   Vancomycin-Trough ug/mL 17.5 16.9       Pharmacy will continue to follow and monitor vancomycin.    Please contact pharmacy at extension [phone] for questions regarding this assessment.    Thank you for the consult,   Balwinder Buckley       Patient brief summary:  Renard Jimenez is a 59 y.o. male initiated on antimicrobial therapy with IV Vancomycin for treatment of skin & soft tissue infection (confirmed MRSA in wound)    The patient's current regimen is Vancomycin 1250 mg Q12    Drug Allergies:   Review of patient's allergies indicates:   Allergen Reactions    Dog dander Hives    House dust Hives    Maple flavor Other (See Comments)     Throat swelling      Pollen extracts Hives       Actual Body Weight:   139.4 kg    Renal Function:   Estimated Creatinine Clearance: 109.9 mL/min (based on SCr of 1 mg/dL).,     Dialysis Method (if applicable):  N/A    CBC (last 72 hours):  Recent Labs   Lab Result Units 12/31/24 0400 01/01/25 0400 01/02/25  0316   WBC K/uL 9.01 8.25 7.45   Hemoglobin g/dL 15.3 14.9 15.6   Hematocrit % 49.2 48.1 51.1   Platelets K/uL 245 220 232   Gran % % 72.1 74.7* 75.4*   Lymph % % 16.2* 15.6* 14.6*   Mono % % 7.8 8.8 9.3   Eosinophil % % 3.4 0.0 0.0   Basophil % % 0.2 0.4 0.4   Differential Method  Automated Automated Automated       Metabolic Panel (last 72 hours):  Recent Labs   Lab Result Units 12/31/24 0400 01/01/25 0400 01/02/25  0316   Sodium mmol/L 137 132*  136   Potassium mmol/L 4.1 4.7 5.4*   Chloride mmol/L 89* 88* 91*   CO2 mmol/L 42* 39* 39*   Glucose mg/dL 92 103 83   BUN mg/dL 19 15 21*   Creatinine mg/dL 0.9 0.9 1.0   Magnesium mg/dL 1.6 1.8 2.2   Phosphorus mg/dL 3.9  --  4.6*       Vancomycin Administrations:  vancomycin given in the last 96 hours                     vancomycin 1,250 mg in 0.9% NaCl 250 mL IVPB (admixture device) (mg) 1,250 mg New Bag 01/01/25 2159     1,250 mg New Bag  0855    vancomycin 1,500 mg in D5W 250 mL IVPB (admixture device) (mg) 1,500 mg New Bag 12/31/24 2124     1,500 mg New Bag  0819     1,500 mg New Bag 12/30/24 2156    vancomycin (VANCOCIN) 2,000 mg in 0.9% NaCl 500 mL IVPB (mg) 2,000 mg New Bag 12/30/24 0840      Restarted 12/29/24 2151     2,000 mg New Bag  2103     2,000 mg New Bag  0900                    Microbiologic Results:  Microbiology Results (last 7 days)       Procedure Component Value Units Date/Time    Blood culture #2 **CANNOT BE ORDERED STAT** [[id number]] Collected: 12/28/24 1850    Order Status: Completed Specimen: Blood Updated: 01/01/25 1032     Blood Culture, Routine No Growth to date      No Growth to date      No Growth to date      No Growth to date    Blood culture #1 **CANNOT BE ORDERED STAT** [[id number]] Collected: 12/28/24 1845    Order Status: Completed Specimen: Blood Updated: 01/01/25 1032     Blood Culture, Routine No Growth to date      No Growth to date      No Growth to date      No Growth to date    Culture, Anaerobe [[id number]] Collected: 12/29/24 1410    Order Status: Completed Specimen: Wound from Foot, Right Updated: 01/01/25 0919     Anaerobic Culture No anaerobes isolated    Aerobic culture [[id number]]  (Abnormal) Collected: 12/29/24 1410    Order Status: Completed Specimen: Wound from Foot, Right Updated: 12/31/24 0704     Aerobic Bacterial Culture METHICILLIN RESISTANT STAPHYLOCOCCUS AUREUS  Moderate  For susceptibility see order #[id number]      Aerobic culture [[id number]]   (Abnormal)  (Susceptibility) Collected: 12/28/24 1906    Order Status: Completed Specimen: Abscess from Foot, Right Updated: 12/31/24 0703     Aerobic Bacterial Culture METHICILLIN RESISTANT STAPHYLOCOCCUS AUREUS  Many  Results called to and read back by:Valerie Reyes RN [facility] 12/31/2024    07:03 JBM      Influenza A & B by Molecular [[id number]] Collected: 12/28/24 1832    Order Status: Completed Specimen: Nasopharyngeal Swab Updated: 12/28/24 1932     Influenza A, Molecular Negative     Influenza B, Molecular Negative     Flu A & B Source Nasal swab

## 2025-01-02 NOTE — PT/OT/SLP PROGRESS
Occupational Therapy      Patient Name:  Renard Jimenez   MRN:  [id number]    Patient not seen today secondary to refusing assistance with ADLs.      1/2/2025

## 2025-01-02 NOTE — NURSING
AVS paperwork discussed all questions answered. Iv's removed. Pt wheeled via tech with oxygen to car.

## 2025-01-02 NOTE — PLAN OF CARE
Pt awake, alert, and oriented. 4L/Oxymask/NC. Ambulated with PT throughout the watson with walker. Pt states he usually has a cane. Good PO intake. 1.8L fluid restriction. MWF wound care completed. Transfer orders to med/surge in place. Continue to monitor and treat pain with PRN meds. Goal 4> (1-10). Pt educated on call light, fall precautions, plan of care. Pt verbalized understanding. Bed in low locked position, call light in reach, bed rails up x2, bed alarm active (zone 2).   Problem: Skin Injury Risk Increased  Goal: Skin Health and Integrity  Outcome: Progressing  Intervention: Optimize Skin Protection  Flowsheets (Taken 1/1/2025 1800)  Pressure Reduction Techniques: frequent weight shift encouraged  Skin Protection: transparent dressing maintained  Activity Management: Rolling - L1  Head of Bed (HOB) Positioning: HOB at 30-45 degrees  Intervention: Promote and Optimize Oral Intake  Flowsheets (Taken 1/1/2025 1800)  Oral Nutrition Promotion: physical activity promoted  Nutrition Interventions: diet adjusted     Problem: Adult Inpatient Plan of Care  Goal: Plan of Care Review  Outcome: Progressing  Flowsheets (Taken 1/1/2025 1800)  Plan of Care Reviewed With: patient  Goal: Patient-Specific Goal (Individualized)  Outcome: Progressing  Flowsheets (Taken 1/1/2025 1800)  Individualized Care Needs: fall precautions, pain management, wound care, decreasing 02 as tolerated  Anxieties, Fears or Concerns: c/o pain  Patient/Family-Specific Goals (Include Timeframe): Pt to sit up in chair for 3 hours by 1/3/2025 1100  Goal: Absence of Hospital-Acquired Illness or Injury  Outcome: Progressing  Intervention: Identify and Manage Fall Risk  Flowsheets (Taken 1/1/2025 1800)  Safety Promotion/Fall Prevention:   assistive device/personal item within reach   bed alarm set   Fall Risk signage in place   Fall Risk reviewed with patient/family   instructed to call staff for mobility   pulse ox   medications reviewed   side rails  raised x 2  Intervention: Prevent Skin Injury  Flowsheets (Taken 1/1/2025 1800)  Body Position: position changed independently  Skin Protection: transparent dressing maintained  Device Skin Pressure Protection:   pressure points protected   skin-to-device areas padded   skin-to-skin areas padded   tubing/devices free from skin contact  Intervention: Prevent and Manage VTE (Venous Thromboembolism) Risk  Flowsheets (Taken 1/1/2025 1800)  VTE Prevention/Management: fluids promoted  Intervention: Prevent Infection  Flowsheets (Taken 1/1/2025 1800)  Infection Prevention: rest/sleep promoted  Goal: Optimal Comfort and Wellbeing  Outcome: Progressing  Intervention: Monitor Pain and Promote Comfort  Flowsheets (Taken 1/1/2025 1800)  Pain Management Interventions: pain management plan reviewed with patient/caregiver  Intervention: Provide Person-Centered Care  Flowsheets (Taken 1/1/2025 1800)  Trust Relationship/Rapport:   care explained   questions encouraged   reassurance provided   choices provided   emotional support provided   thoughts/feelings acknowledged   empathic listening provided   questions answered  Goal: Readiness for Transition of Care  Outcome: Progressing     Problem: COPD (Chronic Obstructive Pulmonary Disease)  Goal: Optimal Chronic Illness Coping  Outcome: Progressing  Intervention: Support and Optimize Psychosocial Response  Flowsheets (Taken 1/1/2025 1800)  Supportive Measures: self-care encouraged  Family/Support System Care:   self-care encouraged   support provided  Goal: Improved Oral Intake  Outcome: Progressing  Intervention: Promote and Optimize Oral Intake  Flowsheets (Taken 1/1/2025 1800)  Oral Nutrition Promotion: physical activity promoted  Nutrition Interventions: diet adjusted     Problem: Wound  Goal: Optimal Functional Ability  Outcome: Progressing  Intervention: Optimize Functional Ability  Flowsheets (Taken 1/1/2025 1800)  Activity Management: Rolling - L1  Goal: Absence of Infection  Signs and Symptoms  Outcome: Progressing  Intervention: Prevent or Manage Infection  Flowsheets (Taken 1/1/2025 1800)  Fever Reduction/Comfort Measures: humidification temperature decreased  Infection Management: aseptic technique maintained  Isolation Precautions:   contact   precautions maintained  Goal: Optimal Pain Control and Function  Outcome: Progressing  Intervention: Prevent or Manage Pain  Flowsheets (Taken 1/1/2025 1800)  Sleep/Rest Enhancement: relaxation techniques promoted  Pain Management Interventions: pain management plan reviewed with patient/caregiver  Goal: Skin Health and Integrity  Outcome: Progressing  Intervention: Optimize Skin Protection  Flowsheets (Taken 1/1/2025 1800)  Pressure Reduction Techniques: frequent weight shift encouraged  Skin Protection: transparent dressing maintained  Activity Management: Rolling - L1  Head of Bed (HOB) Positioning: HOB at 30-45 degrees  Goal: Optimal Wound Healing  Outcome: Progressing  Intervention: Promote Wound Healing  Flowsheets (Taken 1/1/2025 1800)  Sleep/Rest Enhancement: relaxation techniques promoted     Problem: Infection  Goal: Absence of Infection Signs and Symptoms  Outcome: Progressing  Intervention: Prevent or Manage Infection  Flowsheets (Taken 1/1/2025 1800)  Fever Reduction/Comfort Measures: humidification temperature decreased  Infection Management: aseptic technique maintained  Isolation Precautions:   contact   precautions maintained     Problem: Fall Injury Risk  Goal: Absence of Fall and Fall-Related Injury  Outcome: Progressing  Intervention: Identify and Manage Contributors  Flowsheets (Taken 1/1/2025 1800)  Self-Care Promotion: independence encouraged  Medication Review/Management: medications reviewed  Intervention: Promote Injury-Free Environment  Flowsheets (Taken 1/1/2025 1800)  Safety Promotion/Fall Prevention:   assistive device/personal item within reach   bed alarm set   Fall Risk signage in place   Fall Risk reviewed  with patient/family   instructed to call staff for mobility   pulse ox   medications reviewed   side rails raised x 2

## 2025-01-02 NOTE — DISCHARGE INSTRUCTIONS
Increase your flow of oxygen to 2 LPM at home.    Keep post-surgical shoe on your right foot when walking, and try to shift the weight from the forefoot to the heel.  Leave the dressing intact.  The foot surgeon will remove the sutures when he sees you in clinic.    When do I need to call the doctor?   You have a fever of 100.4°F (38°C) or higher or chills.  The area becomes more red, swollen, or painful.  The redness or swelling spreads up your leg or arm or to a larger area.

## 2025-01-02 NOTE — PT/OT/SLP PROGRESS
Physical Therapy      Patient Name:  Renard Jimenez   MRN:  [id number]    Patient not seen today secondary to Other (Comment) (RN held treatment today as pt agitated and was not cooperative with OT during attempt for evaluation this afternoon.). Will follow-up 1/3/25.

## 2025-01-02 NOTE — DISCHARGE SUMMARY
[facility] Medicine  Discharge Summary      Patient Name: Renard Jimenez  MRN: [id number]  PRISCILLA: [id number]  Patient Class: IP- Inpatient  Admission Date: 12/28/2024  Hospital Length of Stay: 5 days  Discharge Date and Time:  01/02/2025 4:05 PM  Attending Physician: Ronnell Kate MD   Discharging Provider: Ronnell Kate MD  Primary Care Provider: Juancho Ortiz MD    Primary Care Team: Networked reference to record PCT     HPI:   59 year old male with comorbid conditions of COPD on 1.5 lpm home oxygen, HFpEF, HLD, hypertension, h/o MI presented to [facility] due to shortness of breath.  Per EMS was saturating 64% on 1.5lpm nasal cannula, was placed on additional oxygen and brought to ED.  Per wife patient was also complaining of right foot pain after stepping on a nail 1 week prior.  Denies chest pain, fevers, nausea/vomiting, diarrhea.   On evaluation patient has crackles bilaterally, edema in extremities. BNP is 676. CXR revealed pulmonary vascular congestion.  Was given 40mg IV lasix with over 2 liter output.  Right foot noted to be erythematous with purulent drainage, U/S extremity did not reveal fluid collection.  X-ray foot revealed 1.0cm metallic foreign body within plantar soft tissue.  Started on vancomycin and zosyn.  Podiatry notified.  Patient was started on nicardipine infusion for blood pressure reaching > .    Blood gas revealed hypercapnia for which patient was started on biPAP.  Also given IV solumedrol, magnesium. Patient was transferred to [facility] for continuous bipap requirement.      * No surgery found *      Hospital Course:   59 year old male with comorbid conditions of COPD, chronic O2 supplementation, HFpEF, HLD, hypertension, and h/o MI presented to [facility] due to shortness of breath.  Per EMS, he had been  saturating 64% on 1.5lpm nasal cannula.  He was placed on additional oxygen and brought to ED.  Per wife patient had also been complaining of right foot  pain after stepping on a nail 1 week prior.  Denied chest pain, fevers, nausea/vomiting, diarrhea.     On evaluation patient had crackles bilaterally, edema in extremities. BNP was 676. CXR revealed pulmonary vascular congestion.  Was given 40mg IV lasix with over 2 liter output.  He was given three more doses of furosemide. Right foot noted to be erythematous with purulent drainage.  US extremity did not reveal fluid collection.  X-ray foot revealed 1.0cm metallic foreign body within plantar soft tissue.  Started on vancomycin and zosyn.  Podiatry consulted with us and performed bedside removal of the foreign body under moderate sedation (appeared to be tip of needle).  Patient was started on nicardipine infusion to control high BP.  He was weaned off of that when the BP improved.  Blood gas revealed hypercapnia for which patient was started on BiPAP.  Also given Solu Medrol and magnesium. Patient was transferred to [facility] for continuous bipap requirement.  We were able to wean him from BiPAP.  ABG's began to show an appropriate rise in serum bicarb to help normalize the pH.  It was apparent that patient had chronic hypercapnia.  The foot grew MRSA.  He was continued on vancomycin.  We worked on lowering his oxygen requirement from 4 LPM to 2, and our goal saturation was between 88 and 92.  Physical therapy recommended low-intensity therapy after discharge.  We discharged the patient home with prescription for clindamycin.  We instructed him to follow up with the podiatrist for removal of sutures.    Physical Exam  Vitals reviewed.   Constitutional:       General: He is not in acute distress.     Appearance: He is not diaphoretic.      Interventions: Nasal cannula in place.   Neck:      Vascular: No JVD.   Cardiovascular:      Rate and Rhythm: Normal rate and regular rhythm.   Pulmonary:      Effort: Pulmonary effort is normal.      Breath sounds: Normal breath sounds. Decreased air movement present.      Goals of  "Care Treatment Preferences:  Code Status: Full Code      SDOH Screening:  The patient was screened for utility difficulties, food insecurity, transport difficulties, housing insecurity, and interpersonal safety and there were no concerns identified this admission.     Consults:   Consults (From admission, onward)          Status Ordering Provider     Inpatient consult to   Once        Provider:  (Not yet assigned)    Acknowledged NARESH DOS SANTOS     Inpatient consult to Midline team  Once        Provider:  (Not yet assigned)    Completed NARESH DOS SANTOS     Inpatient consult to Midline team  Once        Provider:  (Not yet assigned)    Completed CAIN CHENG     Inpatient consult to Podiatry  Once        Provider:  Darci Oreilly DPM    Completed CAIN CHENG     Pharmacy to dose Vancomycin consult  Once        Provider:  (Not yet assigned)   Placed in "And" Linked Group    Acknowledged CAIN CHENG     Inpatient consult to Social Work/Case Management  Once        Provider:  (Not yet assigned)    Completed CAIN CHENG     Inpatient consult to Registered Dietitian/Nutritionist  Once        Provider:  (Not yet assigned)    Completed CAIN CHENG            Morbid obesity  Body mass index is 44.73 kg/m². Morbid obesity complicates all aspects of disease management from diagnostic modalities to treatment. Weight loss encouraged and health benefits explained to patient.         Right foot infection  MRSA growing.  Continue current abx.  Monitor for improvement.  Appreciate podiatry's help.  Continue vancomycin      Puncture wound of right foot with foreign body  Podiatry managing.  Removal of metal foreign body done.      Mixed hyperlipidemia  Continue statin       Essential hypertension  Patient's blood pressure range in the last 24 hours was: BP  Min: 122/68  Max: 196/95.The patient's inpatient anti-hypertensive regimen is listed below:  Current Antihypertensives  , Daily, Oral  , Daily, " "Oral  losartan tablet 50 mg, Daily, Oral  metoprolol tartrate (LOPRESSOR) tablet 25 mg, 2 times daily, Oral  lisinopriL tablet 20 mg, Daily, Oral    Plan  - BP is controlled, no changes needed to their regimen      Final Active Diagnoses:    Diagnosis Date Noted POA    Morbid obesity [E66.01] 12/30/2024 Yes    Puncture wound of right foot with foreign body [S91.341A] 12/29/2024 Yes    Right foot infection [L08.9] 12/29/2024 Yes    Mixed hyperlipidemia [E78.2] 11/09/2022 Yes     Chronic    Essential hypertension [I10] 10/08/2019 Yes     Chronic      Problems Resolved During this Admission:    Diagnosis Date Noted Date Resolved POA    PRINCIPAL PROBLEM:  Acute on chronic respiratory failure with hypoxia and hypercapnia [J96.21, J96.22] 12/29/2024 01/02/2025 Yes    Hyponatremia [E87.1] 12/30/2024 01/02/2025 Yes    Acute on chronic heart failure with preserved ejection fraction [I50.33] 11/09/2022 01/02/2025 Yes    Chronic obstructive pulmonary disease with acute exacerbation [J44.1] 12/29/2016 01/02/2025 Yes       Discharged Condition: good    Disposition: Home-Health Care [facility]    Follow Up:   Follow-up Information       Darci Oreilly DPM Follow up on 1/7/2025.    Specialty: Podiatry  Why: For suture removal 1/7/25 at 9:00 am  Contact information:  [address]  [address]  [place] [address]  [phone]               Juancho Ortiz MD Follow up on 1/10/2025.    Specialty: Internal Medicine  Why: Hospital follow up 1/10/25 at 9:00 am  Contact information:  [address] [address]  [phone]                           Patient Instructions:      WALKER FOR HOME USE     Order Specific Question Answer Comments   Type of Walker: Rollator with brakes and/or seat    With wheels? Yes    Height: 5' 9.5" (1.765 m)    Weight: 139.4 kg (307 lb 5.1 oz)    Length of need (1-99 months): 99    Does patient have medical equipment at home? cane, straight    Does patient have medical equipment at home? nebulizer  "   Does patient have medical equipment at home? oxygen    Please check all that apply: Patient is unable to safely ambulate without equipment.      Ambulatory referral/consult to Home Health   Standing Status: Future   Referral Priority: Routine Referral Type: Home Health   Referral Reason: Specialty Services Required   Requested Specialty: Home Health Services   Number of Visits Requested: 1     Diet Adult Regular     Order Specific Question Answer Comments   Na restriction, if any: 2gNa    Fluid restriction: Fluid - 1800mL      Activity as tolerated   Order Comments: See special instructions regarding your foot        Increase your flow of oxygen to 2 LPM at home.     Keep post-surgical shoe on your right foot when walking, and try to shift the weight from the forefoot to the heel.  Leave the dressing intact.  The foot surgeon will remove the sutures when he sees you in clinic.     When do I need to call the doctor?   You have a fever of 100.4°F (38°C) or higher or chills.  The area becomes more red, swollen, or painful.  The redness or swelling spreads up your leg or arm or to a larger area.        Medications:  Reconciled Home Medications:      Medication List        START taking these medications      clindamycin 300 MG capsule  Commonly known as: CLEOCIN  Take 1 capsule (300 mg total) by mouth every 6 (six) hours. for 8 days     traMADoL 50 mg tablet  Commonly known as: ULTRAM  Take 1 tablet (50 mg total) by mouth every 6 (six) hours as needed for Pain.            CHANGE how you take these medications      clopidogreL 75 mg tablet  Commonly known as: PLAVIX  Take 1 tablet (75 mg total) by mouth once daily.  What changed: when to take this            CONTINUE taking these medications      acetaminophen 500 MG tablet  Commonly known as: TYLENOL  Take 2 tablets (1,000 mg total) by mouth every 6 (six) hours as needed for Pain.     * albuterol 90 mcg/actuation inhaler  Commonly known as: PROVENTIL/VENTOLIN  HFA  Inhale 2 puffs into the lungs every 6 (six) hours as needed.     * albuterol 2.5 mg /3 mL (0.083 %) nebulizer solution  Commonly known as: PROVENTIL  Take 2.5 mg by nebulization every 4 (four) hours as needed.     aspirin 81 MG Chew  Take 1 tablet (81 mg total) by mouth once daily.     atorvastatin 80 MG tablet  Commonly known as: LIPITOR  Take 1 tablet (80 mg total) by mouth once daily.     budesonide-formoterol 160-4.5 mcg 160-4.5 mcg/actuation Hfaa  Commonly known as: SYMBICORT  Inhale 2 puffs into the lungs 2 (two) times daily.     buPROPion 150 MG TB24 tablet  Commonly known as: WELLBUTRIN XL  Take 150 mg by mouth once daily.     cholecalciferol (vitamin D3) 1,250 mcg (50,000 unit) capsule  Take 1 capsule by mouth every 7 days.     diclofenac 75 MG EC tablet  Commonly known as: VOLTAREN  Take 75 mg by mouth.     famotidine 40 MG tablet  Commonly known as: PEPCID  Take 40 mg by mouth daily as needed for Heartburn.     fluticasone furoate-vilanteroL 200-25 mcg/dose Dsdv diskus inhaler  Commonly known as: BREO  Breo Ellipta 200 mcg-25 mcg/dose powder for inhalation   Inhale by inhalation route for 30 days.     fluticasone propionate 50 mcg/actuation nasal spray  Commonly known as: FLONASE  1 spray by Each Nostril route once daily.     gabapentin 300 MG capsule  Commonly known as: NEURONTIN  Take 300 mg by mouth 2 (two) times daily.     hydroCHLOROthiazide 25 MG tablet  Commonly known as: HYDRODIURIL  Take 1 tablet by mouth once daily.     LIDOcaine-prilocaine cream  Commonly known as: EMLA  Apply 1 g topically daily as needed.     losartan 50 MG tablet  Commonly known as: COZAAR  Take 50 mg by mouth once daily.     metoprolol tartrate 25 MG tablet  Commonly known as: LOPRESSOR  Take 1 tablet (25 mg total) by mouth 2 (two) times daily.     montelukast 10 mg tablet  Commonly known as: SINGULAIR  Take 1 tablet by mouth once daily.     mupirocin 2 % ointment  Commonly known as: BACTROBAN  Apply topically 3  (three) times daily.     pantoprazole 40 MG tablet  Commonly known as: PROTONIX  Take 40 mg by mouth once daily.     TRELEGY ELLIPTA 200-62.5-25 mcg inhaler  Generic drug: fluticasone-umeclidin-vilanter  Inhale 1 puff into the lungs once daily.     valACYclovir 1000 MG tablet  Commonly known as: VALTREX  Take 1 tablet (1,000 mg total) by mouth every 12 (twelve) hours. for 7 days           * This list has 2 medication(s) that are the same as other medications prescribed for you. Read the directions carefully, and ask your doctor or other care provider to review them with you.                STOP taking these medications      lisinopriL-hydrochlorothiazide 20-25 mg Tab  Commonly known as: PRINZIDE,ZESTORETIC              Indwelling Lines/Drains at time of discharge:   Lines/Drains/Airways       None                   Time spent on the discharge of patient: 21 minutes        Ronnell Kate MD  Department of Hospital Medicine  [facility]

## 2025-01-02 NOTE — PLAN OF CARE
"CM present during SIBR. Discussed therapy recommendations and pt states "I got my wife". CM discussed benefits of HH to include RN/PT/OT/wound care. He states to call his wife. Discussed walker and pt adamantly refuses this. States he has his cane and will use this.  SC sent to  regarding conversation.       01/02/25 1008   Post-Acute Status   Post-Acute Authorization HME;Home Health   HME Status Patient declined/refused       "

## 2025-01-02 NOTE — PLAN OF CARE
Pt discharged home. All inpatient needs met.      Problem: Adult Inpatient Plan of Care  Goal: Plan of Care Review  Outcome: Met  Goal: Patient-Specific Goal (Individualized)  Outcome: Met  Goal: Absence of Hospital-Acquired Illness or Injury  Outcome: Met  Goal: Optimal Comfort and Wellbeing  Outcome: Met  Goal: Readiness for Transition of Care  Outcome: Met     Problem: Skin Injury Risk Increased  Goal: Skin Health and Integrity  Outcome: Met     Problem: COPD (Chronic Obstructive Pulmonary Disease)  Goal: Optimal Chronic Illness Coping  Outcome: Met  Goal: Improved Oral Intake  Outcome: Met

## 2025-01-02 NOTE — PLAN OF CARE
Patient cleared for discharge from case management standpoint.    Patient accepted by [facility] with start of care date 01/03/2024.  TN scheduled hospital follow ups and placed on AVS.       01/02/25 1631   Final Note   Assessment Type Final Discharge Note   Anticipated Discharge Disposition [facility]   Hospital Resources/Appts/Education Provided Provided patient/caregiver with written discharge plan information;Provided education on problems/symptoms using teachback;Appointments scheduled and added to AVS   Post-Acute Status   Post-Acute Authorization [facility] Status Set-up Complete/Auth obtained   Discharge Delays None known at this time

## 2025-01-02 NOTE — CARE UPDATE
01/01/25 1931   Patient Assessment/Suction   Level of Consciousness (AVPU) alert   Respiratory Effort Normal;Unlabored   Expansion/Accessory Muscles/Retractions no use of accessory muscles   All Lung Fields Breath Sounds equal bilaterally;diminished;wheezes, expiratory   Rhythm/Pattern, Respiratory unlabored   Cough Frequency frequent   Cough Type nonproductive   Skin Integrity   $ Wound Care Tech Time 15 min   Area Observed Left;Right;Behind ear;Bridge of nose   Skin Appearance without discoloration   Barrier used? Liquid Filled Cushion   PRE-TX-O2   Device (Oxygen Therapy) simple face mask   Flow (L/min) (Oxygen Therapy) 4   SpO2 95 %   Pulse Oximetry Type Continuous   $ Pulse Oximetry - Multiple Charge Pulse Oximetry - Multiple   Pulse 67   Resp 16   Aerosol Therapy   $ Aerosol Therapy Charges Aerosol Treatment  (Duoneb)   Daily Review of Necessity (SVN) completed   Respiratory Treatment Status (SVN) given   Treatment Route (SVN) mask   Patient Position sitting on edge of bed   Post Treatment Assessment (SVN) breath sounds unchanged   Signs of Intolerance (SVN) none   Breath Sounds Post-Respiratory Treatment   Throughout All Fields Post-Treatment All Fields   Throughout All Fields Post-Treatment no change   Post-treatment Heart Rate (beats/min) 73   Post-treatment Resp Rate (breaths/min) 20   Education   $ Education Bronchodilator;Oxygen;15 min

## 2025-01-02 NOTE — PLAN OF CARE
Per TN patient request this SW to contact spouse regarding home health services.  SW left voice message for spouse to return  call.  BRISEYDA sent patient information to [facility] via [email or web address] for home health services.       01/02/25 1512   Post-Acute Status   Post-Acute Authorization Home Health   Home Health Status Referrals Sent

## 2025-01-03 LAB
BACTERIA BLD CULT: NORMAL
BACTERIA BLD CULT: NORMAL
OHS QRS DURATION: 106 MS
OHS QTC CALCULATION: 461 MS

## 2025-02-10 ENCOUNTER — EXTERNAL HOME HEALTH (OUTPATIENT)
Dept: HOME HEALTH SERVICES | Facility: HOSPITAL | Age: 60
End: 2025-02-10
Payer: MEDICAID

## 2025-02-15 ENCOUNTER — DOCUMENT SCAN (OUTPATIENT)
Dept: HOME HEALTH SERVICES | Facility: HOSPITAL | Age: 60
End: 2025-02-15
Payer: MEDICAID

## 2025-03-19 ENCOUNTER — HOSPITAL ENCOUNTER (OUTPATIENT)
Dept: RADIOLOGY | Facility: HOSPITAL | Age: 60
Discharge: HOME | End: 2025-03-19
Attending: FAMILY MEDICINE
Payer: MEDICAID

## 2025-03-19 DIAGNOSIS — I10 ESSENTIAL HYPERTENSION, MALIGNANT: Primary | ICD-10-CM

## 2025-03-19 DIAGNOSIS — M54.50 LOW BACK PAIN, UNSPECIFIED: ICD-10-CM

## 2025-03-19 PROCEDURE — 72100 X-RAY EXAM L-S SPINE 2/3 VWS: CPT | Mod: TC,PO

## 2025-03-19 PROCEDURE — 72100 X-RAY EXAM L-S SPINE 2/3 VWS: CPT | Mod: 26,,, | Performed by: RADIOLOGY

## 2025-03-19 RX ORDER — NAPROXEN SODIUM 220 MG/1
81 TABLET, FILM COATED ORAL DAILY
Qty: 30 TABLET | Refills: 3 | OUTPATIENT
Start: 2025-03-19 | End: 2026-03-19

## 2025-04-07 ENCOUNTER — HOSPITAL ENCOUNTER (OUTPATIENT)
Facility: HOSPITAL | Age: 60
Discharge: HOME | End: 2025-04-08
Attending: EMERGENCY MEDICINE | Admitting: INTERNAL MEDICINE
Payer: MEDICAID

## 2025-04-07 DIAGNOSIS — N39.0 URINARY TRACT INFECTION WITHOUT HEMATURIA, SITE UNSPECIFIED: Primary | ICD-10-CM

## 2025-04-07 DIAGNOSIS — R53.1 GENERALIZED WEAKNESS: ICD-10-CM

## 2025-04-07 DIAGNOSIS — R06.02 SOB (SHORTNESS OF BREATH): ICD-10-CM

## 2025-04-07 DIAGNOSIS — R07.9 CHEST PAIN: ICD-10-CM

## 2025-04-07 DIAGNOSIS — I50.9 ACUTE ON CHRONIC CONGESTIVE HEART FAILURE, UNSPECIFIED HEART FAILURE TYPE: ICD-10-CM

## 2025-04-07 DIAGNOSIS — J44.9 CHRONIC OBSTRUCTIVE PULMONARY DISEASE, UNSPECIFIED COPD TYPE: ICD-10-CM

## 2025-04-07 LAB
ABSOLUTE EOSINOPHIL (SMH): 0 K/UL
ABSOLUTE MONOCYTE (SMH): 0.6 K/UL (ref 0.3–1)
ABSOLUTE NEUTROPHIL COUNT (SMH): 8.1 K/UL (ref 1.8–7.7)
ALBUMIN SERPL-MCNC: 3.8 G/DL (ref 3.5–5.2)
ALP SERPL-CCNC: 104 UNIT/L (ref 55–135)
ALT SERPL-CCNC: 20 UNIT/L (ref 10–44)
ANION GAP (SMH): 6 MMOL/L (ref 8–16)
AST SERPL-CCNC: 17 UNIT/L (ref 10–40)
BACTERIA #/AREA URNS AUTO: ABNORMAL /HPF
BASOPHILS # BLD AUTO: 0.03 K/UL
BASOPHILS NFR BLD AUTO: 0.3 %
BILIRUB SERPL-MCNC: 0.7 MG/DL (ref 0.1–1)
BILIRUB UR QL STRIP.AUTO: NEGATIVE
BNP SERPL-MCNC: 147 PG/ML
BUN SERPL-MCNC: 13 MG/DL (ref 6–20)
CALCIUM SERPL-MCNC: 9 MG/DL (ref 8.7–10.5)
CHLORIDE SERPL-SCNC: 100 MMOL/L (ref 95–110)
CLARITY UR: ABNORMAL
CO2 SERPL-SCNC: 31 MMOL/L (ref 23–29)
COLOR UR AUTO: YELLOW
CREAT SERPL-MCNC: 1 MG/DL (ref 0.5–1.4)
D DIMER PPP IA.FEU-MCNC: 1.43 MG/L FEU
ERYTHROCYTE [DISTWIDTH] IN BLOOD BY AUTOMATED COUNT: 13.2 % (ref 11.5–14.5)
GFR SERPLBLD CREATININE-BSD FMLA CKD-EPI: >60 ML/MIN/1.73/M2
GLUCOSE SERPL-MCNC: 157 MG/DL (ref 70–110)
GLUCOSE UR QL STRIP: NEGATIVE
HCT VFR BLD AUTO: 52.1 % (ref 40–54)
HGB BLD-MCNC: 16.6 GM/DL (ref 14–18)
HGB UR QL STRIP: ABNORMAL
IMM GRANULOCYTES # BLD AUTO: 0.03 K/UL (ref 0–0.04)
IMM GRANULOCYTES NFR BLD AUTO: 0.3 % (ref 0–0.5)
INFLUENZA A MOLECULAR (OHS): NEGATIVE
INFLUENZA B MOLECULAR (OHS): NEGATIVE
KETONES UR QL STRIP: NEGATIVE
LDH SERPL L TO P-CCNC: 1.05 MMOL/L (ref 0.5–2.2)
LEUKOCYTE ESTERASE UR QL STRIP: ABNORMAL
LIPASE SERPL-CCNC: 7 U/L (ref 4–60)
LYMPHOCYTES # BLD AUTO: 1.08 K/UL (ref 1–4.8)
MAGNESIUM SERPL-MCNC: 1.7 MG/DL (ref 1.6–2.6)
MCH RBC QN AUTO: 32.4 PG (ref 27–31)
MCHC RBC AUTO-ENTMCNC: 31.9 G/DL (ref 32–36)
MCV RBC AUTO: 102 FL (ref 82–98)
MICROSCOPIC COMMENT: ABNORMAL
NITRITE UR QL STRIP: NEGATIVE
NUCLEATED RBC (/100WBC) (SMH): 0 /100 WBC
PH UR STRIP: 7 [PH]
PLATELET # BLD AUTO: 179 K/UL (ref 150–450)
PMV BLD AUTO: 10.8 FL (ref 9.2–12.9)
POTASSIUM SERPL-SCNC: 4.4 MMOL/L (ref 3.5–5.1)
PROT SERPL-MCNC: 7.1 GM/DL (ref 6–8.4)
PROT UR QL STRIP: ABNORMAL
RBC # BLD AUTO: 5.13 M/UL (ref 4.6–6.2)
RBC #/AREA URNS AUTO: 16 /HPF
RELATIVE EOSINOPHIL (SMH): 0 % (ref 0–8)
RELATIVE LYMPHOCYTE (SMH): 11 % (ref 18–48)
RELATIVE MONOCYTE (SMH): 6.1 % (ref 4–15)
RELATIVE NEUTROPHIL (SMH): 82.3 % (ref 38–73)
SAMPLE: NORMAL
SARS-COV-2 RDRP RESP QL NAA+PROBE: NEGATIVE
SODIUM SERPL-SCNC: 137 MMOL/L (ref 136–145)
SP GR UR STRIP: 1.02
SQUAMOUS #/AREA URNS AUTO: 20 /HPF
TROPONIN HIGH SENSITIVE (SMH): 12.2 PG/ML
UROBILINOGEN UR STRIP-ACNC: ABNORMAL EU/DL
WBC # BLD AUTO: 9.86 K/UL (ref 3.9–12.7)
WBC #/AREA URNS AUTO: >100 /HPF
WBC CLUMPS UR QL AUTO: ABNORMAL

## 2025-04-07 PROCEDURE — 93010 ELECTROCARDIOGRAM REPORT: CPT | Mod: ,,, | Performed by: GENERAL PRACTICE

## 2025-04-07 PROCEDURE — 96375 TX/PRO/DX INJ NEW DRUG ADDON: CPT

## 2025-04-07 PROCEDURE — 83690 ASSAY OF LIPASE: CPT | Performed by: EMERGENCY MEDICINE

## 2025-04-07 PROCEDURE — 83735 ASSAY OF MAGNESIUM: CPT | Performed by: EMERGENCY MEDICINE

## 2025-04-07 PROCEDURE — 85379 FIBRIN DEGRADATION QUANT: CPT | Performed by: EMERGENCY MEDICINE

## 2025-04-07 PROCEDURE — 85025 COMPLETE CBC W/AUTO DIFF WBC: CPT | Performed by: EMERGENCY MEDICINE

## 2025-04-07 PROCEDURE — 94799 UNLISTED PULMONARY SVC/PX: CPT

## 2025-04-07 PROCEDURE — 94761 N-INVAS EAR/PLS OXIMETRY MLT: CPT

## 2025-04-07 PROCEDURE — 87040 BLOOD CULTURE FOR BACTERIA: CPT | Mod: 91 | Performed by: EMERGENCY MEDICINE

## 2025-04-07 PROCEDURE — U0002 COVID-19 LAB TEST NON-CDC: HCPCS | Performed by: EMERGENCY MEDICINE

## 2025-04-07 PROCEDURE — 81001 URINALYSIS AUTO W/SCOPE: CPT | Performed by: EMERGENCY MEDICINE

## 2025-04-07 PROCEDURE — 99900035 HC TECH TIME PER 15 MIN (STAT)

## 2025-04-07 PROCEDURE — 87086 URINE CULTURE/COLONY COUNT: CPT | Performed by: EMERGENCY MEDICINE

## 2025-04-07 PROCEDURE — 87502 INFLUENZA DNA AMP PROBE: CPT | Performed by: EMERGENCY MEDICINE

## 2025-04-07 PROCEDURE — 36415 COLL VENOUS BLD VENIPUNCTURE: CPT | Performed by: EMERGENCY MEDICINE

## 2025-04-07 PROCEDURE — 63600175 PHARM REV CODE 636 W HCPCS: Performed by: EMERGENCY MEDICINE

## 2025-04-07 PROCEDURE — 84484 ASSAY OF TROPONIN QUANT: CPT | Performed by: EMERGENCY MEDICINE

## 2025-04-07 PROCEDURE — 99406 BEHAV CHNG SMOKING 3-10 MIN: CPT

## 2025-04-07 PROCEDURE — 94640 AIRWAY INHALATION TREATMENT: CPT

## 2025-04-07 PROCEDURE — 27000221 HC OXYGEN, UP TO 24 HOURS

## 2025-04-07 PROCEDURE — 83880 ASSAY OF NATRIURETIC PEPTIDE: CPT | Performed by: EMERGENCY MEDICINE

## 2025-04-07 PROCEDURE — 93005 ELECTROCARDIOGRAM TRACING: CPT | Performed by: GENERAL PRACTICE

## 2025-04-07 PROCEDURE — 80053 COMPREHEN METABOLIC PANEL: CPT | Performed by: EMERGENCY MEDICINE

## 2025-04-07 PROCEDURE — 25000242 PHARM REV CODE 250 ALT 637 W/ HCPCS: Performed by: EMERGENCY MEDICINE

## 2025-04-07 PROCEDURE — 25500020 PHARM REV CODE 255: Performed by: EMERGENCY MEDICINE

## 2025-04-07 PROCEDURE — 99285 EMERGENCY DEPT VISIT HI MDM: CPT | Mod: 25

## 2025-04-07 RX ORDER — CEFTRIAXONE 1 G/1
1 INJECTION, POWDER, FOR SOLUTION INTRAMUSCULAR; INTRAVENOUS
Status: COMPLETED | OUTPATIENT
Start: 2025-04-07 | End: 2025-04-07

## 2025-04-07 RX ORDER — IPRATROPIUM BROMIDE AND ALBUTEROL SULFATE 2.5; .5 MG/3ML; MG/3ML
3 SOLUTION RESPIRATORY (INHALATION)
Status: COMPLETED | OUTPATIENT
Start: 2025-04-07 | End: 2025-04-07

## 2025-04-07 RX ORDER — FUROSEMIDE 10 MG/ML
20 INJECTION INTRAMUSCULAR; INTRAVENOUS
Status: COMPLETED | OUTPATIENT
Start: 2025-04-07 | End: 2025-04-07

## 2025-04-07 RX ADMIN — CEFTRIAXONE 1 G: 1 INJECTION, POWDER, FOR SOLUTION INTRAMUSCULAR; INTRAVENOUS at 10:04

## 2025-04-07 RX ADMIN — FUROSEMIDE 20 MG: 10 INJECTION, SOLUTION INTRAMUSCULAR; INTRAVENOUS at 09:04

## 2025-04-07 RX ADMIN — IOHEXOL 100 ML: 350 INJECTION, SOLUTION INTRAVENOUS at 10:04

## 2025-04-07 RX ADMIN — IPRATROPIUM BROMIDE AND ALBUTEROL SULFATE 3 ML: .5; 3 SOLUTION RESPIRATORY (INHALATION) at 07:04

## 2025-04-08 VITALS
WEIGHT: 280.19 LBS | RESPIRATION RATE: 20 BRPM | TEMPERATURE: 98 F | OXYGEN SATURATION: 94 % | DIASTOLIC BLOOD PRESSURE: 84 MMHG | HEIGHT: 69 IN | HEART RATE: 86 BPM | SYSTOLIC BLOOD PRESSURE: 165 MMHG | BODY MASS INDEX: 41.5 KG/M2

## 2025-04-08 PROBLEM — J44.1 COPD EXACERBATION: Status: ACTIVE | Noted: 2025-04-08

## 2025-04-08 PROBLEM — J44.1 COPD EXACERBATION: Status: RESOLVED | Noted: 2025-04-08 | Resolved: 2025-04-08

## 2025-04-08 PROBLEM — N39.0 URINARY TRACT INFECTION WITHOUT HEMATURIA: Status: ACTIVE | Noted: 2025-04-08

## 2025-04-08 LAB
ABSOLUTE EOSINOPHIL (SMH): 0 K/UL
ABSOLUTE MONOCYTE (SMH): 0.09 K/UL (ref 0.3–1)
ABSOLUTE NEUTROPHIL COUNT (SMH): 11.2 K/UL (ref 1.8–7.7)
ADENOVIRUS (SMH): NOT DETECTED
ALBUMIN SERPL-MCNC: 4.2 G/DL (ref 3.5–5.2)
ALP SERPL-CCNC: 103 UNIT/L (ref 55–135)
ALT SERPL-CCNC: 22 UNIT/L (ref 10–44)
ANION GAP (SMH): 7 MMOL/L (ref 8–16)
AST SERPL-CCNC: 16 UNIT/L (ref 10–40)
BASOPHILS # BLD AUTO: 0.03 K/UL
BASOPHILS NFR BLD AUTO: 0.2 %
BILIRUB SERPL-MCNC: 0.9 MG/DL (ref 0.1–1)
BORDETELLA PARAPERTUSSIS (IS1001) (SMH): NOT DETECTED
BORDETELLA PERTUSSIS (PTXP) (SMH): NOT DETECTED
BUN SERPL-MCNC: 13 MG/DL (ref 6–20)
CALCIUM SERPL-MCNC: 9.5 MG/DL (ref 8.7–10.5)
CHLAMYDIA PNEUMONIAE (SMH): NOT DETECTED
CHLORIDE SERPL-SCNC: 95 MMOL/L (ref 95–110)
CO2 SERPL-SCNC: 33 MMOL/L (ref 23–29)
CORONAVIRUS 229E, COMMON COLD VIRUS (SMH): NOT DETECTED
CORONAVIRUS HKU1, COMMON COLD VIRUS (SMH): NOT DETECTED
CORONAVIRUS NL63, COMMON COLD VIRUS (SMH): NOT DETECTED
CORONAVIRUS OC43, COMMON COLD VIRUS (SMH): NOT DETECTED
CREAT SERPL-MCNC: 1 MG/DL (ref 0.5–1.4)
ERYTHROCYTE [DISTWIDTH] IN BLOOD BY AUTOMATED COUNT: 13.4 % (ref 11.5–14.5)
FLUBV RNA NPH QL NAA+NON-PROBE: NOT DETECTED
GFR SERPLBLD CREATININE-BSD FMLA CKD-EPI: >60 ML/MIN/1.73/M2
GLUCOSE SERPL-MCNC: 148 MG/DL (ref 70–110)
HCT VFR BLD AUTO: 55.3 % (ref 40–54)
HGB BLD-MCNC: 17.6 GM/DL (ref 14–18)
HPIV1 RNA NPH QL NAA+NON-PROBE: NOT DETECTED
HPIV2 RNA NPH QL NAA+NON-PROBE: NOT DETECTED
HPIV3 RNA NPH QL NAA+NON-PROBE: NOT DETECTED
HPIV4 RNA NPH QL NAA+NON-PROBE: NOT DETECTED
HUMAN METAPNEUMOVIRUS (SMH): NOT DETECTED
IMM GRANULOCYTES # BLD AUTO: 0.06 K/UL (ref 0–0.04)
IMM GRANULOCYTES NFR BLD AUTO: 0.5 % (ref 0–0.5)
INFLUENZA A (SUBTYPES H1,H1-2009,H3) (SMH): NOT DETECTED
LYMPHOCYTES # BLD AUTO: 0.61 K/UL (ref 1–4.8)
MAGNESIUM SERPL-MCNC: 2.1 MG/DL (ref 1.6–2.6)
MCH RBC QN AUTO: 31.9 PG (ref 27–31)
MCHC RBC AUTO-ENTMCNC: 31.8 G/DL (ref 32–36)
MCV RBC AUTO: 100 FL (ref 82–98)
MYCOPLASMA PNEUMONIAE (SMH): NOT DETECTED
NUCLEATED RBC (/100WBC) (SMH): 0 /100 WBC
PLATELET # BLD AUTO: 194 K/UL (ref 150–450)
PMV BLD AUTO: 10.9 FL (ref 9.2–12.9)
POTASSIUM SERPL-SCNC: 4.2 MMOL/L (ref 3.5–5.1)
PROCALCITONIN SERPL-MCNC: 0.13 NG/ML
PROT SERPL-MCNC: 8.1 GM/DL (ref 6–8.4)
RBC # BLD AUTO: 5.52 M/UL (ref 4.6–6.2)
RELATIVE EOSINOPHIL (SMH): 0 % (ref 0–8)
RELATIVE LYMPHOCYTE (SMH): 5.1 % (ref 18–48)
RELATIVE MONOCYTE (SMH): 0.7 % (ref 4–15)
RELATIVE NEUTROPHIL (SMH): 93.5 % (ref 38–73)
RESPIRATORY INFECTION PANEL SOURCE (SMH): NORMAL
RSV RNA NPH QL NAA+NON-PROBE: NOT DETECTED
RV+EV RNA NPH QL NAA+NON-PROBE: NOT DETECTED
SARS-COV-2 RNA RESP QL NAA+PROBE: NOT DETECTED
SODIUM SERPL-SCNC: 135 MMOL/L (ref 136–145)
WBC # BLD AUTO: 12.01 K/UL (ref 3.9–12.7)

## 2025-04-08 PROCEDURE — G0378 HOSPITAL OBSERVATION PER HR: HCPCS

## 2025-04-08 PROCEDURE — 82040 ASSAY OF SERUM ALBUMIN: CPT | Performed by: INTERNAL MEDICINE

## 2025-04-08 PROCEDURE — 63600175 PHARM REV CODE 636 W HCPCS: Mod: JZ,TB | Performed by: EMERGENCY MEDICINE

## 2025-04-08 PROCEDURE — 96365 THER/PROPH/DIAG IV INF INIT: CPT

## 2025-04-08 PROCEDURE — 96375 TX/PRO/DX INJ NEW DRUG ADDON: CPT

## 2025-04-08 PROCEDURE — 25000003 PHARM REV CODE 250: Performed by: INTERNAL MEDICINE

## 2025-04-08 PROCEDURE — 25000242 PHARM REV CODE 250 ALT 637 W/ HCPCS: Performed by: INTERNAL MEDICINE

## 2025-04-08 PROCEDURE — 94761 N-INVAS EAR/PLS OXIMETRY MLT: CPT

## 2025-04-08 PROCEDURE — 84145 PROCALCITONIN (PCT): CPT | Performed by: INTERNAL MEDICINE

## 2025-04-08 PROCEDURE — 63600175 PHARM REV CODE 636 W HCPCS: Performed by: INTERNAL MEDICINE

## 2025-04-08 PROCEDURE — 96376 TX/PRO/DX INJ SAME DRUG ADON: CPT

## 2025-04-08 PROCEDURE — 83735 ASSAY OF MAGNESIUM: CPT | Performed by: INTERNAL MEDICINE

## 2025-04-08 PROCEDURE — 36415 COLL VENOUS BLD VENIPUNCTURE: CPT | Performed by: INTERNAL MEDICINE

## 2025-04-08 PROCEDURE — 85025 COMPLETE CBC W/AUTO DIFF WBC: CPT | Performed by: INTERNAL MEDICINE

## 2025-04-08 PROCEDURE — 0202U NFCT DS 22 TRGT SARS-COV-2: CPT | Performed by: INTERNAL MEDICINE

## 2025-04-08 PROCEDURE — 96366 THER/PROPH/DIAG IV INF ADDON: CPT

## 2025-04-08 PROCEDURE — 99900031 HC PATIENT EDUCATION (STAT)

## 2025-04-08 PROCEDURE — 96372 THER/PROPH/DIAG INJ SC/IM: CPT | Performed by: INTERNAL MEDICINE

## 2025-04-08 PROCEDURE — 94640 AIRWAY INHALATION TREATMENT: CPT | Mod: XB

## 2025-04-08 PROCEDURE — 27000221 HC OXYGEN, UP TO 24 HOURS

## 2025-04-08 RX ORDER — VALACYCLOVIR HYDROCHLORIDE 500 MG/1
1 TABLET, FILM COATED ORAL DAILY
COMMUNITY

## 2025-04-08 RX ORDER — IPRATROPIUM BROMIDE AND ALBUTEROL SULFATE 2.5; .5 MG/3ML; MG/3ML
3 SOLUTION RESPIRATORY (INHALATION)
Status: DISCONTINUED | OUTPATIENT
Start: 2025-04-08 | End: 2025-04-08 | Stop reason: HOSPADM

## 2025-04-08 RX ORDER — FAMOTIDINE 10 MG/ML
20 INJECTION, SOLUTION INTRAVENOUS 2 TIMES DAILY
Status: DISCONTINUED | OUTPATIENT
Start: 2025-04-08 | End: 2025-04-08 | Stop reason: HOSPADM

## 2025-04-08 RX ORDER — ALUMINUM HYDROXIDE, MAGNESIUM HYDROXIDE, AND SIMETHICONE 1200; 120; 1200 MG/30ML; MG/30ML; MG/30ML
30 SUSPENSION ORAL 4 TIMES DAILY PRN
Status: DISCONTINUED | OUTPATIENT
Start: 2025-04-08 | End: 2025-04-08 | Stop reason: HOSPADM

## 2025-04-08 RX ORDER — AMOXICILLIN 250 MG
1 CAPSULE ORAL 2 TIMES DAILY
Status: DISCONTINUED | OUTPATIENT
Start: 2025-04-08 | End: 2025-04-08 | Stop reason: HOSPADM

## 2025-04-08 RX ORDER — IBUPROFEN 200 MG
16 TABLET ORAL
Status: DISCONTINUED | OUTPATIENT
Start: 2025-04-08 | End: 2025-04-08 | Stop reason: HOSPADM

## 2025-04-08 RX ORDER — SODIUM,POTASSIUM PHOSPHATES 280-250MG
2 POWDER IN PACKET (EA) ORAL
Status: DISCONTINUED | OUTPATIENT
Start: 2025-04-08 | End: 2025-04-08 | Stop reason: HOSPADM

## 2025-04-08 RX ORDER — GABAPENTIN 300 MG/1
300 CAPSULE ORAL 2 TIMES DAILY
Status: DISCONTINUED | OUTPATIENT
Start: 2025-04-08 | End: 2025-04-08 | Stop reason: HOSPADM

## 2025-04-08 RX ORDER — NALOXONE HCL 0.4 MG/ML
0.02 VIAL (ML) INJECTION
Status: DISCONTINUED | OUTPATIENT
Start: 2025-04-08 | End: 2025-04-08 | Stop reason: HOSPADM

## 2025-04-08 RX ORDER — POTASSIUM CHLORIDE 20 MEQ/1
20 TABLET, EXTENDED RELEASE ORAL 2 TIMES DAILY
Status: DISCONTINUED | OUTPATIENT
Start: 2025-04-08 | End: 2025-04-08 | Stop reason: HOSPADM

## 2025-04-08 RX ORDER — METHYLPREDNISOLONE SOD SUCC 125 MG
125 VIAL (EA) INJECTION
Status: COMPLETED | OUTPATIENT
Start: 2025-04-08 | End: 2025-04-08

## 2025-04-08 RX ORDER — LANOLIN ALCOHOL/MO/W.PET/CERES
800 CREAM (GRAM) TOPICAL
Status: DISCONTINUED | OUTPATIENT
Start: 2025-04-08 | End: 2025-04-08 | Stop reason: HOSPADM

## 2025-04-08 RX ORDER — NAPROXEN SODIUM 220 MG/1
81 TABLET, FILM COATED ORAL DAILY
Status: DISCONTINUED | OUTPATIENT
Start: 2025-04-08 | End: 2025-04-08 | Stop reason: HOSPADM

## 2025-04-08 RX ORDER — IBUPROFEN 200 MG
24 TABLET ORAL
Status: DISCONTINUED | OUTPATIENT
Start: 2025-04-08 | End: 2025-04-08 | Stop reason: HOSPADM

## 2025-04-08 RX ORDER — LANOLIN ALCOHOL/MO/W.PET/CERES
400 CREAM (GRAM) TOPICAL 2 TIMES DAILY
Status: DISCONTINUED | OUTPATIENT
Start: 2025-04-08 | End: 2025-04-08 | Stop reason: HOSPADM

## 2025-04-08 RX ORDER — IPRATROPIUM BROMIDE AND ALBUTEROL SULFATE 2.5; .5 MG/3ML; MG/3ML
3 SOLUTION RESPIRATORY (INHALATION) 4 TIMES DAILY
Status: DISCONTINUED | OUTPATIENT
Start: 2025-04-08 | End: 2025-04-08

## 2025-04-08 RX ORDER — SODIUM CHLORIDE 9 MG/ML
INJECTION, SOLUTION INTRAVENOUS CONTINUOUS
Status: DISCONTINUED | OUTPATIENT
Start: 2025-04-08 | End: 2025-04-08

## 2025-04-08 RX ORDER — LOSARTAN POTASSIUM 50 MG/1
50 TABLET ORAL DAILY
Status: DISCONTINUED | OUTPATIENT
Start: 2025-04-08 | End: 2025-04-08 | Stop reason: HOSPADM

## 2025-04-08 RX ORDER — HYDRALAZINE HYDROCHLORIDE 25 MG/1
50 TABLET, FILM COATED ORAL 3 TIMES DAILY
Status: DISCONTINUED | OUTPATIENT
Start: 2025-04-08 | End: 2025-04-08 | Stop reason: HOSPADM

## 2025-04-08 RX ORDER — CEFDINIR 300 MG/1
300 CAPSULE ORAL 2 TIMES DAILY
Qty: 14 CAPSULE | Refills: 0 | Status: SHIPPED | OUTPATIENT
Start: 2025-04-08 | End: 2025-04-15

## 2025-04-08 RX ORDER — SODIUM CHLORIDE 0.9 % (FLUSH) 0.9 %
3 SYRINGE (ML) INJECTION EVERY 12 HOURS PRN
Status: DISCONTINUED | OUTPATIENT
Start: 2025-04-08 | End: 2025-04-08 | Stop reason: HOSPADM

## 2025-04-08 RX ORDER — ACETAMINOPHEN 325 MG/1
650 TABLET ORAL EVERY 4 HOURS PRN
Status: DISCONTINUED | OUTPATIENT
Start: 2025-04-08 | End: 2025-04-08 | Stop reason: HOSPADM

## 2025-04-08 RX ORDER — BUPROPION HYDROCHLORIDE 150 MG/1
150 TABLET ORAL DAILY
Status: DISCONTINUED | OUTPATIENT
Start: 2025-04-08 | End: 2025-04-08 | Stop reason: HOSPADM

## 2025-04-08 RX ORDER — MAGNESIUM SULFATE HEPTAHYDRATE 40 MG/ML
2 INJECTION, SOLUTION INTRAVENOUS ONCE
Status: COMPLETED | OUTPATIENT
Start: 2025-04-08 | End: 2025-04-08

## 2025-04-08 RX ORDER — FUROSEMIDE 10 MG/ML
20 INJECTION INTRAMUSCULAR; INTRAVENOUS EVERY 12 HOURS
Status: DISCONTINUED | OUTPATIENT
Start: 2025-04-08 | End: 2025-04-08

## 2025-04-08 RX ORDER — GUAIFENESIN 100 MG/5ML
400 LIQUID ORAL 4 TIMES DAILY
Status: DISCONTINUED | OUTPATIENT
Start: 2025-04-08 | End: 2025-04-08 | Stop reason: HOSPADM

## 2025-04-08 RX ORDER — ONDANSETRON HYDROCHLORIDE 2 MG/ML
4 INJECTION, SOLUTION INTRAVENOUS EVERY 6 HOURS PRN
Status: DISCONTINUED | OUTPATIENT
Start: 2025-04-08 | End: 2025-04-08 | Stop reason: HOSPADM

## 2025-04-08 RX ORDER — ALBUTEROL SULFATE 0.83 MG/ML
2.5 SOLUTION RESPIRATORY (INHALATION) EVERY 4 HOURS PRN
Status: DISCONTINUED | OUTPATIENT
Start: 2025-04-08 | End: 2025-04-08 | Stop reason: HOSPADM

## 2025-04-08 RX ORDER — GLUCAGON 1 MG
1 KIT INJECTION
Status: DISCONTINUED | OUTPATIENT
Start: 2025-04-08 | End: 2025-04-08 | Stop reason: HOSPADM

## 2025-04-08 RX ORDER — FUROSEMIDE 10 MG/ML
20 INJECTION INTRAMUSCULAR; INTRAVENOUS DAILY
Status: DISCONTINUED | OUTPATIENT
Start: 2025-04-08 | End: 2025-04-08 | Stop reason: HOSPADM

## 2025-04-08 RX ORDER — PREDNISONE 20 MG/1
TABLET ORAL
Qty: 15 TABLET | Refills: 0 | Status: SHIPPED | OUTPATIENT
Start: 2025-04-08 | End: 2025-04-20

## 2025-04-08 RX ORDER — CEFTRIAXONE 1 G/1
1 INJECTION, POWDER, FOR SOLUTION INTRAMUSCULAR; INTRAVENOUS
Status: DISCONTINUED | OUTPATIENT
Start: 2025-04-08 | End: 2025-04-08 | Stop reason: HOSPADM

## 2025-04-08 RX ORDER — TALC
6 POWDER (GRAM) TOPICAL NIGHTLY PRN
Status: DISCONTINUED | OUTPATIENT
Start: 2025-04-08 | End: 2025-04-08 | Stop reason: HOSPADM

## 2025-04-08 RX ORDER — CLOPIDOGREL BISULFATE 75 MG/1
75 TABLET ORAL NIGHTLY
Status: DISCONTINUED | OUTPATIENT
Start: 2025-04-08 | End: 2025-04-08 | Stop reason: HOSPADM

## 2025-04-08 RX ORDER — ACETAMINOPHEN 325 MG/1
650 TABLET ORAL EVERY 8 HOURS PRN
Status: DISCONTINUED | OUTPATIENT
Start: 2025-04-08 | End: 2025-04-08 | Stop reason: HOSPADM

## 2025-04-08 RX ORDER — ENOXAPARIN SODIUM 100 MG/ML
60 INJECTION SUBCUTANEOUS EVERY 24 HOURS
Status: DISCONTINUED | OUTPATIENT
Start: 2025-04-08 | End: 2025-04-08

## 2025-04-08 RX ORDER — ENOXAPARIN SODIUM 100 MG/ML
40 INJECTION SUBCUTANEOUS EVERY 12 HOURS
Status: DISCONTINUED | OUTPATIENT
Start: 2025-04-08 | End: 2025-04-08 | Stop reason: HOSPADM

## 2025-04-08 RX ADMIN — FAMOTIDINE 20 MG: 10 INJECTION, SOLUTION INTRAVENOUS at 01:04

## 2025-04-08 RX ADMIN — SENNOSIDES, DOCUSATE SODIUM 1 TABLET: 50; 8.6 TABLET, FILM COATED ORAL at 08:04

## 2025-04-08 RX ADMIN — BUPROPION HYDROCHLORIDE 150 MG: 150 TABLET, EXTENDED RELEASE ORAL at 08:04

## 2025-04-08 RX ADMIN — ASPIRIN 81 MG: 81 TABLET, CHEWABLE ORAL at 08:04

## 2025-04-08 RX ADMIN — HYDRALAZINE HYDROCHLORIDE 50 MG: 25 TABLET ORAL at 08:04

## 2025-04-08 RX ADMIN — FAMOTIDINE 20 MG: 10 INJECTION, SOLUTION INTRAVENOUS at 08:04

## 2025-04-08 RX ADMIN — ENOXAPARIN SODIUM 40 MG: 40 INJECTION SUBCUTANEOUS at 08:04

## 2025-04-08 RX ADMIN — LOSARTAN POTASSIUM 50 MG: 50 TABLET, FILM COATED ORAL at 08:04

## 2025-04-08 RX ADMIN — POTASSIUM CHLORIDE 20 MEQ: 1500 TABLET, EXTENDED RELEASE ORAL at 08:04

## 2025-04-08 RX ADMIN — GUAIFENESIN 400 MG: 200 SOLUTION ORAL at 03:04

## 2025-04-08 RX ADMIN — Medication 400 MG: at 08:04

## 2025-04-08 RX ADMIN — IPRATROPIUM BROMIDE AND ALBUTEROL SULFATE 3 ML: .5; 3 SOLUTION RESPIRATORY (INHALATION) at 06:04

## 2025-04-08 RX ADMIN — GUAIFENESIN 400 MG: 200 SOLUTION ORAL at 08:04

## 2025-04-08 RX ADMIN — IPRATROPIUM BROMIDE AND ALBUTEROL SULFATE 3 ML: .5; 3 SOLUTION RESPIRATORY (INHALATION) at 11:04

## 2025-04-08 RX ADMIN — FUROSEMIDE 20 MG: 10 INJECTION, SOLUTION INTRAMUSCULAR; INTRAVENOUS at 08:04

## 2025-04-08 RX ADMIN — METHYLPREDNISOLONE SODIUM SUCCINATE 125 MG: 125 INJECTION, POWDER, FOR SOLUTION INTRAMUSCULAR; INTRAVENOUS at 12:04

## 2025-04-08 RX ADMIN — METHYLPREDNISOLONE SODIUM SUCCINATE 40 MG: 40 INJECTION, POWDER, FOR SOLUTION INTRAMUSCULAR; INTRAVENOUS at 05:04

## 2025-04-08 RX ADMIN — GABAPENTIN 300 MG: 300 CAPSULE ORAL at 09:04

## 2025-04-08 RX ADMIN — MAGNESIUM SULFATE HEPTAHYDRATE 2 G: 40 INJECTION, SOLUTION INTRAVENOUS at 01:04

## 2025-04-08 NOTE — PLAN OF CARE
[facility]  Initial Discharge Assessment       Primary Care Provider: Juancho Ortiz MD    Admission Diagnosis: Urinary tract infection without hematuria, site unspecified [N39.0]    Admission Date: 4/7/2025  Expected Discharge Date: 4/8/2025    SW met with patient bedside. SW verified demographics, insurance, supports, and PCP.  Patient reported he lives in the home with spouse and spouse brings him to appointments. SW assessed patient's needs. Patient is able to complete ADLs independently. Patient verified at home DME: cane, oxygen, nebulizer.  Patient verified No HH, No Dialysis, Blood Thinners (Plavix), and Oxygen. Patient reported using 1.5 to 2 liters of oxygen at home.     Patient verified pharmacy of choice: [facility]  Patient confirmed spouse will be source of transportation at the time of discharge.       Transition of Care Barriers: None    Payor: MEDICAID / Plan: [place] / Product Type: Managed Medicaid /     Extended Emergency Contact Information  Primary Emergency Contact: Estephanie Jimenez  Address: [address] [place] [address] [id number] [place] [address] of [place]  Mobile Phone: [phone]  Relation: Spouse    Discharge Plan A: Home  Discharge Plan B: Home      [facility] - [place], [place] - [address] [address]  [address]  [place] [address]  Phone: [phone] Fax: [phone]    [facility] [address] [address]  Phone: [phone] Fax: [phone]      Initial Assessment (most recent)       Adult Discharge Assessment - 04/08/25 1037          Discharge Assessment    Assessment Type Discharge Planning Assessment     Confirmed/corrected address, phone number and insurance Yes     Confirmed Demographics Correct on Facesheet     Source of Information patient     Communicated TANIA with patient/caregiver Date not available/Unable to determine     Reason For Admission UTI     People in Home  spouse     Do you expect to return to your current living situation? Yes     Do you have help at home or someone to help you manage your care at home? No     Prior to hospitilization cognitive status: Unable to Assess     Current cognitive status: Alert/Oriented     Walking or Climbing Stairs Difficulty yes     Walking or Climbing Stairs ambulation difficulty, requires equipment     Mobility Management cane     Dressing/Bathing Difficulty no     Home Accessibility stairs to enter home     Number of Stairs, Main Entrance three     Home Layout Able to live on 1st floor     Equipment Currently Used at Home cane, straight     Readmission within 30 days? No     Patient currently being followed by outpatient case management? No     Do you currently have service(s) that help you manage your care at home? No     Do you take prescription medications? Yes     Do you have prescription coverage? Yes     Coverage Medicaid     Do you have any problems affording any of your prescribed medications? No     Is the patient taking medications as prescribed? yes     Who is going to help you get home at discharge? spouse     How do you get to doctors appointments? family or friend will provide     Are you on dialysis? No     Do you take coumadin? No   Plavix    Discharge Plan A Home     Discharge Plan B Home     DME Needed Upon Discharge  none     Discharge Plan discussed with: Patient     Transition of Care Barriers None

## 2025-04-08 NOTE — ASSESSMENT & PLAN NOTE
On home oxygen     On same level here now  1.5 - 2 liters     Has wheezing all over but no distress on the floor when I saw him     CTA chest was clear     I checked procalcitonin and it was NORMAL       PLAN    - copd pathway order set is in     - IV Steroids  Solumedrol 40 mg Q8  IV       - Duonebs QID     - albuterol Q4 PRN    - Robitussin 400 mg po QID     - IV lasix 20 mg Daily    ( I do not think this is CHF but might help a little since BNP was up some )      - continuous pulse oximetry if they can on that floor     NC Oxygen as needed

## 2025-04-08 NOTE — ASSESSMENT & PLAN NOTE
Patient's blood pressure range in the last 24 hours was: BP  Min: 161/74  Max: 189/85.The patient's inpatient anti-hypertensive regimen is listed below:  Current Antihypertensives  furosemide injection 20 mg, Daily, Intravenous    Plan  - BP is uncontrolled, will adjust as follows: BP High right now    - I will add something for HTN     Maybe Hydralazine PO TID  start 50 mg

## 2025-04-08 NOTE — HOSPITAL COURSE
During the hospitalization the patient was admitted to [facility] for evaluation and management of acute cystitis as well as a COPD exacerbation.  Urinalysis was remarkable for leukocyte esterase but negative for nitrites, he was started and given Rocephin.  He was also treated for COPD exacerbation, he was given IV steroids.  His imaging did not reveal anything significant but did confirm the possibility of acute cystitis.  He has remained on his baseline O2 of 1.5 L, no longer endorses shortness of breath, wheezing has resolved.  He was given a completion course of antibiotics as well as a tapered course of steroids on discharge.  He was seen and examined on the day of discharge was medically stable.

## 2025-04-08 NOTE — CARE UPDATE
04/08/25 0653   Patient Assessment/Suction   Level of Consciousness (AVPU) alert   Respiratory Effort Unlabored   Expansion/Accessory Muscles/Retractions no use of accessory muscles;no retractions;expansion symmetric   All Lung Fields Breath Sounds Anterior:;Lateral:;diminished;wheezes, expiratory   Rhythm/Pattern, Respiratory unlabored;shortness of breath   PRE-TX-O2   Device (Oxygen Therapy) nasal cannula   $ Is the patient on Low Flow Oxygen? Yes   Flow (L/min) (Oxygen Therapy) 2   SpO2 (!) 90 %   Pulse Oximetry Type Intermittent   $ Pulse Oximetry - Multiple Charge Pulse Oximetry - Multiple   Pulse 95   Resp 18   Aerosol Therapy   $ Aerosol Therapy Charges Aerosol Treatment   Respiratory Treatment Status (SVN) given   Treatment Route (SVN) mask;oxygen   Patient Position HOB elevated   Post Treatment Assessment (SVN) increased aeration   Signs of Intolerance (SVN) none   Breath Sounds Post-Respiratory Treatment   Post-treatment Heart Rate (beats/min) 90   Post-treatment Resp Rate (breaths/min) 18

## 2025-04-08 NOTE — PROGRESS NOTES
Pharmacist Renal Dose Adjustment Note    Renard Jimenez is a 59 y.o. male being treated with Enoxaparin.     Patient Data:    Vital Signs (Most Recent):  Temp: 98.7 °F (37.1 °C) (04/07/25 1933)  Pulse: 95 (04/08/25 0015)  Resp: 20 (04/08/25 0015)  BP: (!) 161/74 (04/07/25 2130)  SpO2: 95 % (04/08/25 0015) Vital Signs (72h Range):  Temp:  [98.7 °F (37.1 °C)]   Pulse:  []   Resp:  [20-23]   BP: (161-189)/(74-85)   SpO2:  [93 %-97 %]      Recent Labs   Lab 04/07/25 1945   CREATININE 1.0     Serum creatinine: 1 mg/dL 04/07/25 1945  Estimated creatinine clearance: 104.3 mL/min  Body mass index is 40.76 kg/m².    Enoxaparin 60 mg every 24 hours will be changed to Enoxaparin 40 mg every 12 hours per pharmacy protocol.     Pharmacist's Name: Shasta Scales  Pharmacist's Extension: [phone]

## 2025-04-08 NOTE — NURSING
Pt was very aggravated during assessment and medication review/administration. Wants to be left alone so he can sleep and was also unhappy that we had to correct the cardio leads and do vital signs

## 2025-04-08 NOTE — NURSING
Made DR Flores aware of patients bp, 187/102 on  arrival to unit , hr 92, will continue to monitor

## 2025-04-08 NOTE — CARE UPDATE
04/08/25 0346   Patient Assessment/Suction   Level of Consciousness (AVPU) alert   Respiratory Effort Unlabored   Expansion/Accessory Muscles/Retractions expansion symmetric   All Lung Fields Breath Sounds clear   PRE-TX-O2   Device (Oxygen Therapy) nasal cannula   $ Is the patient on Low Flow Oxygen? Yes   Flow (L/min) (Oxygen Therapy) 2   Pulse 88   Resp 18   Education   $ Education Oxygen;15 min

## 2025-04-08 NOTE — CARE UPDATE
04/07/25 1954   Patient Assessment/Suction   Level of Consciousness (AVPU) alert   Respiratory Effort Normal;Unlabored   Expansion/Accessory Muscles/Retractions no use of accessory muscles;expansion symmetric   All Lung Fields Breath Sounds diminished;coarse   Rhythm/Pattern, Respiratory unlabored;depth regular   Cough Frequency frequent   Cough Type productive   PRE-TX-O2   Device (Oxygen Therapy) nasal cannula   $ Is the patient on Low Flow Oxygen? Yes   Flow (L/min) (Oxygen Therapy) 2   SpO2 (!) 94 %   Pulse Oximetry Type Continuous   $ Pulse Oximetry - Multiple Charge Pulse Oximetry - Multiple   Pulse 88   Resp (!) 23   Aerosol Therapy   $ Aerosol Therapy Charges Aerosol Treatment   Daily Review of Necessity (SVN) completed   Respiratory Treatment Status (SVN) given   Treatment Route (SVN) mask;air   Patient Position HOB elevated   Post Treatment Assessment (SVN) increased aeration   Signs of Intolerance (SVN) none   Tobacco Cessation Intervention   Do you use any type of tobacco product? Yes   Are you interested in quitting use of tobacco products? Not interested   $ Smoking Cessation Charges Smoking Cessation - Intermediate (Non-CTTS)   Respiratory Evaluation   $ Care Plan Tech Time 15 min   $ Respiratory Evaluation Complete   Evaluation For New Orders   Admitting Diagnosis n/a   Cardiac Diagnosis hypertension   Pulmonary Diagnosis copd, Tobacco abuse (Chronic)   Current Surgeries /a   Home Oxygen   Has Home Oxygen? Yes   Liter Flow 2   Duration as needed;with activity  (1.5 resting, 2L with ambulating)   Route nasal cannula   Device home concentrator   Home Aerosol, MDI, DPI, and Other Treatments/Therapies   Home Respiratory Therapy Per Patient/Review of Chart Yes   Aerosol Home Meds/Freq albuterol q6 prn   MDI Home Meds/Freq ventolin inhaler q6 prn   DPI Home Meds/Freq Breo qd, symbicort bid   Oxygen Care Plan   Oxygen Care Plan Per Protocol   SPO2 Goal (%) 92% non-cardiac   Rationale SpO2 is <92%  (Non-cardiac Pt.)   Bronchodilator Care Plan   Bronchodilator Care Plan Aerosol   Aerosol Meds w/ frequency Ventolin/Proventil(Albuterol Sulfate) 2.5mg PRN;Pulmicort(Budenoside) 0.5mg BID   Rationale Maintain home respiratory medicine   Atelectasis Care Plan   Rationale No Rational Found   Airway Clearance Care Plan   Rationale No rationale found

## 2025-04-08 NOTE — ASSESSMENT & PLAN NOTE
Patient's blood pressure range in the last 24 hours was: BP  Min: 161/74  Max: 189/85.The patient's inpatient anti-hypertensive regimen is listed below:  Current Antihypertensives  furosemide injection 20 mg, Daily, Intravenous  losartan tablet 50 mg, Daily, Oral  hydrALAZINE tablet 50 mg, 3 times daily, Oral    Plan  - BP is uncontrolled, will adjust as follows: BP High right now    - I will add something for HTN     Maybe Hydralazine PO TID  start 50 mg

## 2025-04-08 NOTE — CARE UPDATE
04/08/25 0346   Patient Assessment/Suction   Level of Consciousness (AVPU) alert   Respiratory Effort Unlabored   Expansion/Accessory Muscles/Retractions expansion symmetric   All Lung Fields Breath Sounds coarse   Rhythm/Pattern, Respiratory depth regular;pattern regular   Cough Frequency infrequent   PRE-TX-O2   Device (Oxygen Therapy) venturi mask   $ Is the patient on Low Flow Oxygen? Yes   Flow (L/min) (Oxygen Therapy) 15   Oxygen Concentration (%) 50   SpO2 97 %   Pulse Oximetry Type Continuous   $ Pulse Oximetry - Multiple Charge Pulse Oximetry - Multiple   Ready to Wean/Extubation Screen   FIO2<=50 (chart decimal) 0.5   Education   $ Education Oxygen;15 min

## 2025-04-08 NOTE — NURSING
Pt verbalized understanding of discharge instructions. IV and tele box removed. Box returned to monitor room. Pt wheeled down to private vehicle with all personal belongings

## 2025-04-08 NOTE — PLAN OF CARE
Problem: Adult Inpatient Plan of Care  Goal: Plan of Care Review  Outcome: Met  Goal: Patient-Specific Goal (Individualized)  Outcome: Met  Goal: Absence of Hospital-Acquired Illness or Injury  Outcome: Met  Goal: Optimal Comfort and Wellbeing  Outcome: Met  Goal: Readiness for Transition of Care  Outcome: Met     Problem: Bariatric Environmental Safety  Goal: Safety Maintained with Care  Outcome: Met     Problem: Wound  Goal: Optimal Coping  Outcome: Met  Goal: Optimal Functional Ability  Outcome: Met  Goal: Absence of Infection Signs and Symptoms  Outcome: Met  Goal: Improved Oral Intake  Outcome: Met  Goal: Optimal Pain Control and Function  Outcome: Met  Goal: Skin Health and Integrity  Outcome: Met  Goal: Optimal Wound Healing  Outcome: Met     Problem: COPD (Chronic Obstructive Pulmonary Disease)  Goal: Optimal Chronic Illness Coping  Outcome: Met  Goal: Optimal Level of Functional [place]  Outcome: Met  Goal: Absence of Infection Signs and Symptoms  Outcome: Met  Goal: Improved Oral Intake  Outcome: Met  Goal: Effective Oxygenation and Ventilation  Outcome: Met     Problem: Infection  Goal: Absence of Infection Signs and Symptoms  Outcome: Met

## 2025-04-08 NOTE — H&P
[facility]  History & Physical    Patient Name: Renard Jimenez  MRN: [id number]  Patient Class: OP- Observation  Admission Date: 4/7/2025  Attending Physician: Jabari Flores MD  Primary Care Provider: Juancho Ortiz MD         Patient information was obtained from patient and ER records.     Subjective:     Principal Problem:COPD exacerbation    Chief Complaint:   Chief Complaint   Patient presents with    Dizziness     States this happens when he gets hypoxic    Shortness of Breath     Possible pna per EMS.  SOB since 4am    Cough     productive        HPI: 59 male Pt w/ PMHx COPD on chronic oxygen therapy (1.5-2L), HFpEF, HLD, hypertension, obesity, current smoker who presents to the ED by ambulance with generalized weakness, fatigue, dizziness described as a lightheaded/presyncope feeling that has increased since yesterday.  Patient states he has had increased shortness of breath at baseline  with productive cough for the last 2-3 days.  He reports subjective fever and chills.  He has occasional chest pain, described as tightness that comes and goes.  He reports some increased orthopnea and increased lower extremity edema from baseline.  He does report some chronic abdominal pain that has been ongoing for a couple of months and states he is overdue for a colonoscopy.  He denies any nausea or vomiting.  He does report some chronic constipation.  He also reports some burning with urination for a few weeks.  He can not identify any exacerbating or alleviating factors.  He reports compliance with his medications.  He denies any recent antibiotic or steroid use.  Oxygen saturation low 90s with EMS on chronic home oxygen therapy.       He is on 1.5 liters oxygen at home    Still smokes   ( did not want nicotine patch )     Drinks some days but not every day           In our ER     Labs showed mild elevated WBC     UA shows UTI     Chemistry was normal       CTA shows no PE and  clear lungs      COVID FLU both NEG       BNP mildly up in 100-200 range         He told me he has no problem laying flat       They gave him some IV lasix     And IV steroids and Nebs     Also got IV rocephin for the UTI         We are admitting for COPD exacerbation     Past Medical History:   Diagnosis Date    CHF (congestive heart failure)     COPD (chronic obstructive pulmonary disease)     HLD (hyperlipidemia)     Hypertension     MI, old        Past Surgical History:   Procedure Laterality Date    CAROTID ENDARTERECTOMY Left 11/17/2022    Procedure: ENDARTERECTOMY-CAROTID;  Surgeon: ELIDA Guadalupe II, MD;  Location: [facility] OR [address] [place] [address];  Service: Vascular;  Laterality: Left;    LAPAROSCOPIC CHOLECYSTECTOMY N/A 4/11/2022    Procedure: CHOLECYSTECTOMY, LAPAROSCOPIC;  Surgeon: Neville Hutchinson MD;  Location: [facility];  Service: General;  Laterality: N/A;    TONSILLECTOMY         Review of patient's allergies indicates:   Allergen Reactions    Dog dander Hives    House dust Hives    Maple flavor Other (See Comments)     Throat swelling      Pollen extracts Hives       Current Facility-Administered Medications on File Prior to Encounter   Medication    diphenhydrAMINE injection 25 mg    HYDROmorphone injection 0.2 mg    prochlorperazine injection Soln 5 mg    sodium chloride 0.9% flush 10 mL     Current Outpatient Medications on File Prior to Encounter   Medication Sig    acetaminophen (TYLENOL) 500 MG tablet Take 2 tablets (1,000 mg total) by mouth every 6 (six) hours as needed for Pain.    albuterol (PROVENTIL) 2.5 mg /3 mL (0.083 %) nebulizer solution Take 2.5 mg by nebulization every 4 (four) hours as needed.    albuterol (PROVENTIL/VENTOLIN HFA) 90 mcg/actuation inhaler Inhale 2 puffs into the lungs every 6 (six) hours as needed.    aspirin 81 MG Chew Take 1 tablet (81 mg total) by mouth once daily.    atorvastatin (LIPITOR) 80 MG tablet Take 1 tablet (80 mg total) by mouth once daily.    budesonide-formoterol  160-4.5 mcg (SYMBICORT) 160-4.5 mcg/actuation HFAA Inhale 2 puffs into the lungs 2 (two) times daily.    buPROPion (WELLBUTRIN XL) 150 MG TB24 tablet Take 150 mg by mouth once daily.    cholecalciferol, vitamin D3, 1,250 mcg (50,000 unit) capsule Take 1 capsule by mouth every 7 days.    clopidogreL (PLAVIX) 75 mg tablet Take 1 tablet (75 mg total) by mouth once daily. (Patient taking differently: Take 75 mg by mouth every evening.)    diclofenac (VOLTAREN) 75 MG EC tablet Take 75 mg by mouth.    famotidine (PEPCID) 40 MG tablet Take 40 mg by mouth daily as needed for Heartburn.    fluticasone furoate-vilanteroL (BREO) 200-25 mcg/dose DsDv diskus inhaler Breo Ellipta 200 mcg-25 mcg/dose powder for inhalation   Inhale by inhalation route for 30 days.    fluticasone propionate (FLONASE) 50 mcg/actuation nasal spray 1 spray by Each Nostril route once daily.    gabapentin (NEURONTIN) 300 MG capsule Take 300 mg by mouth 2 (two) times daily.    hydroCHLOROthiazide (HYDRODIURIL) 25 MG tablet Take 1 tablet by mouth once daily.    LIDOcaine-prilocaine (EMLA) cream Apply 1 g topically daily as needed.    losartan (COZAAR) 50 MG tablet Take 50 mg by mouth once daily.    metoprolol tartrate (LOPRESSOR) 25 MG tablet Take 1 tablet (25 mg total) by mouth 2 (two) times daily.    montelukast (SINGULAIR) 10 mg tablet Take 1 tablet by mouth once daily.    mupirocin (BACTROBAN) 2 % ointment Apply topically 3 (three) times daily.    pantoprazole (PROTONIX) 40 MG tablet Take 40 mg by mouth once daily.    traMADoL (ULTRAM) 50 mg tablet Take 1 tablet (50 mg total) by mouth every 6 (six) hours as needed for Pain.    TRELEGY ELLIPTA 200-62.5-25 mcg inhaler Inhale 1 puff into the lungs once daily.    valACYclovir (VALTREX) 1000 MG tablet Take 1 tablet (1,000 mg total) by mouth every 12 (twelve) hours. for 7 days     Family History       Problem Relation (Age of Onset)    Hypertension Father          Tobacco Use    Smoking status: Every Day      Current packs/day: 1.00     Average packs/day: 1 pack/day for 30.0 years (30.0 ttl pk-yrs)     Types: Cigarettes    Smokeless tobacco: Not on file   Substance and Sexual Activity    Alcohol use: Not Currently    Drug use: Yes     Types: Marijuana     Comment: 3 to 4 times a week    Sexual activity: Yes     Partners: Female     Review of Systems   Respiratory:  Positive for shortness of breath and wheezing.      Objective:     Vital Signs (Most Recent):  Temp: 98.3 °F (36.8 °C) (04/08/25 0345)  Pulse: 88 (04/08/25 0346)  Resp: 18 (04/08/25 0346)  BP: (!) 187/102 (04/08/25 0345)  SpO2: 95 % (04/08/25 0015) Vital Signs (24h Range):  Temp:  [98.3 °F (36.8 °C)-98.7 °F (37.1 °C)] 98.3 °F (36.8 °C)  Pulse:  [] 88  Resp:  [18-23] 18  SpO2:  [93 %-97 %] 95 %  BP: (161-189)/() 187/102     Weight: 127.1 kg (280 lb 3.3 oz)  Body mass index is 41.38 kg/m².     Physical Exam  Vitals and nursing note reviewed.   Constitutional:       Appearance: Normal appearance.   HENT:      Head: Normocephalic.      Nose: Nose normal.      Mouth/Throat:      Mouth: Mucous membranes are moist.   Eyes:      Extraocular Movements: Extraocular movements intact.      Pupils: Pupils are equal, round, and reactive to light.   Cardiovascular:      Rate and Rhythm: Normal rate and regular rhythm.      Pulses: Normal pulses.      Heart sounds: Normal heart sounds.   Pulmonary:      Effort: Pulmonary effort is normal.      Breath sounds: Wheezing present.   Abdominal:      General: Abdomen is flat. Bowel sounds are normal.      Palpations: Abdomen is soft.   Musculoskeletal:         General: Normal range of motion.      Cervical back: Normal range of motion.   Skin:     General: Skin is warm.      Capillary Refill: Capillary refill takes less than 2 seconds.   Neurological:      General: No focal deficit present.      Mental Status: He is alert and oriented to person, place, and time.   Psychiatric:         Mood and Affect: Mood normal.         " Behavior: Behavior normal.              CRANIAL NERVES     CN III, IV, VI   Pupils are equal, round, and reactive to light.       Significant Labs: All pertinent labs within the past 24 hours have been reviewed.  CBC:   Recent Labs   Lab 04/07/25 1945 04/08/25 0455   WBC 9.86 12.01   HGB 16.6 17.6   HCT 52.1 55.3*    194     CMP:   Recent Labs   Lab 04/07/25 1945 04/08/25 0455    135*   K 4.4 4.2   CO2 31* 33*   BUN 13 13   CREATININE 1.0 1.0   CALCIUM 9.0 9.5   ALBUMIN 3.8 4.2   BILITOT 0.7 0.9   ALKPHOS 104 103   AST 17 16   ALT 20 22     Cardiac Markers:   Recent Labs   Lab 04/07/25 1945   *     Coagulation: No results for input(s): "PT", "INR", "APTT" in the last 48 hours.  Lactic Acid: No results for input(s): "LACTATE" in the last 48 hours.  Lipase:   Recent Labs   Lab 04/07/25 1945   LIPASE 7     Lipid Panel: No results for input(s): "CHOL", "HDL", "LDLCALC", "TRIG", "CHOLHDL" in the last 48 hours.  Magnesium:   Recent Labs   Lab 04/07/25 1945 04/08/25 0455   MG 1.7 2.1     Troponin: No results for input(s): "TROPONINI", "TROPONINIHS" in the last 48 hours.  TSH: No results for input(s): "TSH" in the last 4320 hours.  Urine Studies:   Recent Labs   Lab 04/07/25 2129   APPEARANCEUA Cloudy*   SPECGRAV 1.020   PROTEINUA Trace*   BILIRUBINUA Negative   UROBILINOGEN 2.0-3.0*   LEUKOCYTESUR 3+*   RBCUA 16*   WBCUA >100*   BACTERIA Few*       Significant Imaging: I have reviewed all pertinent imaging results/findings within the past 24 hours.  I have reviewed and interpreted all pertinent imaging results/findings within the past 24 hours.  Assessment/Plan:     Assessment & Plan  COPD exacerbation  On home oxygen     On same level here now  1.5 - 2 liters     Has wheezing all over but no distress on the floor when I saw him     CTA chest was clear     I checked procalcitonin and it was NORMAL       PLAN    - copd pathway order set is in     - IV Steroids  Solumedrol 40 mg Q8  IV       - " "Duonebs QID     - albuterol Q4 PRN    - Robitussin 400 mg po QID     - IV lasix 20 mg Daily    ( I do not think this is CHF but might help a little since BNP was up some )      - continuous pulse oximetry if they can on that floor     NC Oxygen as needed   Tobacco abuse    Still smokes    Did not want nicotine patch   Essential hypertension  Patient's blood pressure range in the last 24 hours was: BP  Min: 161/74  Max: 189/85.The patient's inpatient anti-hypertensive regimen is listed below:  Current Antihypertensives  furosemide injection 20 mg, Daily, Intravenous    Plan  - BP is uncontrolled, will adjust as follows: BP High right now    - I will add something for HTN     Maybe Hydralazine PO TID  start 50 mg   COPD (chronic obstructive pulmonary disease)  On home oxygen     On same level here now  1.5 - 2 liters     Has wheezing all over but no distress on the floor when I saw him     CTA chest was clear     I checked procalcitonin and it was NORMAL       PLAN    - copd pathway order set is in     - IV Steroids  Solumedrol 40 mg Q8  IV       - Duonebs QID     - albuterol Q4 PRN    - Robitussin 400 mg po QID     - IV lasix 20 mg Daily    ( I do not think this is CHF but might help a little since BNP was up some )      - continuous pulse oximetry if they can on that floor     NC Oxygen as needed   Urinary tract infection without hematuria    IV rocephin 1 Gram Q24 hours x 3 doses   VTE Risk Mitigation (From admission, onward)           Ordered     enoxaparin injection 40 mg  Every 12 hours        Note to Pharmacy: Ht: 5' 9.5" (1.765 m)  Wt: 127 kg (280 lb)  Estimated Creatinine Clearance: 104.3 mL/min (based on SCr of 1 mg/dL).  Body mass index is 40.76 kg/m².    04/08/25 0228     IP VTE HIGH RISK PATIENT  Once         04/08/25 0221     Place sequential compression device  Until discontinued         04/08/25 0221                       On 04/08/2025, patient should be placed in hospital observation services under my " care.        Pharmacist Renal Dose Adjustment Note    Renard Jimenez is a 59 y.o. male being treated with Enoxaparin.     Patient Data:    Vital Signs (Most Recent):  Temp: 98.7 °F (37.1 °C) (04/07/25 1933)  Pulse: 95 (04/08/25 0015)  Resp: 20 (04/08/25 0015)  BP: (!) 161/74 (04/07/25 2130)  SpO2: 95 % (04/08/25 0015) Vital Signs (72h Range):  Temp:  [98.7 °F (37.1 °C)]   Pulse:  []   Resp:  [20-23]   BP: (161-189)/(74-85)   SpO2:  [93 %-97 %]      Recent Labs   Lab 04/07/25 1945   CREATININE 1.0     Serum creatinine: 1 mg/dL 04/07/25 1945  Estimated creatinine clearance: 104.3 mL/min  Body mass index is 40.76 kg/m².    Enoxaparin 60 mg every 24 hours will be changed to Enoxaparin 40 mg every 12 hours per pharmacy protocol.     Pharmacist's Name: Shasta Scales  Pharmacist's Extension: [phone]      Jabari Flores MD  Department of Hospital Medicine  [facility]

## 2025-04-08 NOTE — PLAN OF CARE
Discharge orders and chart reviewed. No other discharge needs noted at this time. Pt is clear for discharge from case management. Pt is discharging to home.    Patient kindly declined to follow up at [facility] DC clinic. Patient requested to follow up with his PCP. Email sent to [facility] follow up appointment - clinic to contact patient with appointment date/time    Spouse to transport patient home - spouse to bring patient's home oxygen equipment for transport     04/08/25 5666   Final Note   Assessment Type Final Discharge Note   Anticipated Discharge Disposition Home   What phone number can be called within the next 1-3 days to see how you are doing after discharge? [phone]   Hospital Resources/Appts/Education Provided Appointment suggestion unavailable   Post-Acute Status   Discharge Delays (!) Personal Transportation

## 2025-04-08 NOTE — ED PROVIDER NOTES
Encounter Date: 4/7/2025       History     Chief Complaint   Patient presents with    Dizziness     States this happens when he gets hypoxic    Shortness of Breath     Possible pna per EMS.  SOB since 4am    Cough     productive     HPI  Pt w/ PMHx COPD on chronic oxygen therapy (1.5-2L), HFpEF, HLD, hypertension, obesity, current smoker who presents to the ED by ambulance with generalized weakness, fatigue, dizziness described as a lightheaded/presyncope feeling that has increased since yesterday.  Patient states he has had increased shortness of breath at baseline  with productive cough for the last 2-3 days.  He reports subjective fever and chills.  He has occasional chest pain, described as tightness that comes and goes.  He reports some increased orthopnea and increased lower extremity edema from baseline.  He does report some chronic abdominal pain that has been ongoing for a couple of months and states he is overdue for a colonoscopy.  He denies any nausea or vomiting.  He does report some chronic constipation.  He also reports some burning with urination for a few weeks.  He can not identify any exacerbating or alleviating factors.  He reports compliance with his medications.  He denies any recent antibiotic or steroid use.  Oxygen saturation low 90s with EMS on chronic home oxygen therapy.    Review of patient's allergies indicates:   Allergen Reactions    Dog dander Hives    House dust Hives    Maple flavor Other (See Comments)     Throat swelling      Pollen extracts Hives     Past Medical History:   Diagnosis Date    CHF (congestive heart failure)     COPD (chronic obstructive pulmonary disease)     HLD (hyperlipidemia)     Hypertension     MI, old      Past Surgical History:   Procedure Laterality Date    CAROTID ENDARTERECTOMY Left 11/17/2022    Procedure: ENDARTERECTOMY-CAROTID;  Surgeon: ELIDA Guadalupe II, MD;  Location: [facility] OR [address] [place] [address];  Service: Vascular;  Laterality: Left;    LAPAROSCOPIC  CHOLECYSTECTOMY N/A 4/11/2022    Procedure: CHOLECYSTECTOMY, LAPAROSCOPIC;  Surgeon: Neville Hutchinson MD;  Location: [facility];  Service: General;  Laterality: N/A;    TONSILLECTOMY       Family History   Problem Relation Name Age of Onset    Hypertension Father       Social History[1]  Review of Systems   Constitutional:  Positive for chills, fatigue and fever.   HENT:  Positive for congestion. Negative for sore throat.    Respiratory:  Positive for cough, chest tightness and shortness of breath.    Cardiovascular:  Positive for chest pain and leg swelling.   Gastrointestinal:  Positive for constipation. Negative for abdominal pain, nausea and vomiting.   Genitourinary:  Positive for dysuria.   Neurological:  Positive for dizziness and light-headedness.       Physical Exam     Initial Vitals [04/07/25 1933]   BP Pulse Resp Temp SpO2   (!) 189/85 85 (!) 21 98.7 °F (37.1 °C) (!) 93 %      MAP       --         Physical Exam    Nursing note and vitals reviewed.  Constitutional: No distress.   Disheveled, obese, ill-appearing, nontoxic.   HENT:   Head: Normocephalic and atraumatic.   Nose: Nose normal.   Eyes: Conjunctivae and EOM are normal. Pupils are equal, round, and reactive to light.   Neck: Neck supple.   Normal range of motion.  Cardiovascular:  Normal rate and regular rhythm.           Pulmonary/Chest: No respiratory distress.   Mildly tachypneic with bibasilar crackles and faint end expiratory wheeze   Abdominal: Abdomen is soft. He exhibits distension. There is abdominal tenderness (mild generalized). There is no rebound and no guarding.   Musculoskeletal:         General: Edema (BLE, symmetric) present. No tenderness. Normal range of motion.      Cervical back: Normal range of motion and neck supple.     Neurological: He is alert and oriented to person, place, and time. He has normal strength. No cranial nerve deficit. GCS score is 15. GCS eye subscore is 4. GCS verbal subscore is 5. GCS motor subscore is 6.    Skin: Skin is warm and dry. Capillary refill takes less than 2 seconds. No rash noted.   Psychiatric: He has a normal mood and affect. Thought content normal.         ED Course   Procedures  Labs Reviewed   COMPREHENSIVE METABOLIC PANEL - Abnormal       Result Value    Sodium 137      Potassium 4.4      Chloride 100      CO2 31 (*)     Glucose 157 (*)     BUN 13      Creatinine 1.0      Calcium 9.0      Protein Total 7.1      Albumin 3.8      Bilirubin Total 0.7            AST 17      ALT 20      Anion Gap 6 (*)     eGFR >60     URINALYSIS, REFLEX TO URINE CULTURE - Abnormal    Color, UA Yellow      Appearance, UA Cloudy (*)     Spec Grav UA 1.020      pH, UA 7.0      Protein, UA Trace (*)     Glucose, UA Negative      Ketones, UA Negative      Blood, UA 1+ (*)     Bilirubin, UA Negative      Urobilinogen, UA 2.0-3.0 (*)     Nitrites, UA Negative      Leukocyte Esterase, UA 3+ (*)    B-TYPE NATRIURETIC PEPTIDE - Abnormal     (*)    D DIMER, QUANTITATIVE - Abnormal    D-Dimer 1.43 (*)    CBC WITH DIFFERENTIAL - Abnormal    WBC 9.86      RBC 5.13      Hgb 16.6      Hct 52.1       (*)     MCH 32.4 (*)     MCHC 31.9 (*)     RDW 13.2      Platelet Count 179      MPV 10.8      Nucleated RBC 0      Neut % 82.3 (*)     Lymph % 11.0 (*)     Mono % 6.1      Eos % 0.0      Basophil % 0.3      Imm Grans % 0.3      Neut # 8.1 (*)     Lymph # 1.08      Mono # 0.60      Eos # 0.00      Baso # 0.03      Imm Grans # 0.03     URINALYSIS MICROSCOPIC - Abnormal    RBC, UA 16 (*)     WBC, UA >100 (*)     WBC Clumps, UA Many (*)     Bacteria, UA Few (*)     Squamous Epithelial Cells, UA 20      Microscopic Comment       INFLUENZA A & B BY MOLECULAR - Normal    INFLUENZA A MOLECULAR Negative      INFLUENZA B MOLECULAR  Negative     TROPONIN I HIGH SENSITIVITY - Normal    Troponin High Sensitive 12.2     LIPASE - Normal    Lipase Level 7     MAGNESIUM - Normal    Magnesium 1.7     SARS-COV-2 RNA AMPLIFICATION, QUAL -  Normal    SARS COV-2 Molecular Negative     CULTURE, URINE   CULTURE, BLOOD   CULTURE, BLOOD   RESPIRATORY INFECTION PANEL (PCR), NASOPHARYNGEAL   CBC W/ AUTO DIFFERENTIAL    Narrative:     The following orders were created for panel order CBC auto differential.  Procedure                               Abnormality         Status                     ---------                               -----------         ------                     CBC with Differential[[id number]]       Abnormal            Final result                 Please view results for these tests on the individual orders.   EXTRA TUBES    Narrative:     The following orders were created for panel order EXTRA TUBES.  Procedure                               Abnormality         Status                     ---------                               -----------         ------                     Light Green Top Hold[[id number]]                            In process                   Please view results for these tests on the individual orders.   LIGHT GREEN TOP HOLD   PROCALCITONIN   ISTAT LACTATE    POC Lactate 1.05      Sample VENOUS     POCT LACTATE     EKG Readings: (Independently Interpreted)   NSR, HR 87, PACs present, no significant ST abnormalities.  No STEMI.       Imaging Results              CT Abdomen Pelvis With IV Contrast NO Oral Contrast (Final result)  Result time 04/07/25 23:06:20      Final result by Chava Nick MD (04/07/25 23:06:20)                   Impression:      1. Mild cirrhotic changes of the liver.  Recommend clinical correlation.  2. Calcified pleural plaques posteriorly near the right lung base.  3. Mild cardiomegaly.  4. Mild diffuse urinary bladder wall thickening.  Cystitis is a consideration.  No focal abnormality.  5. Osteoarthritic changes of the hips left greater than right.      Electronically signed by: Chava Nick  Date:    04/07/2025  Time:    23:06               Narrative:    EXAMINATION:  CT ABDOMEN PELVIS WITH IV  CONTRAST    CLINICAL HISTORY:  Abdominal pain, acute, nonlocalized;    TECHNIQUE:  Low dose axial images, sagittal and coronal reformations were obtained from the lung bases to the pubic symphysis following the IV administration of 100 mL of Omnipaque 350 .  Oral contrast was not administered.    COMPARISON:  04/11/2022    FINDINGS:  Abdomen:    - Lower thorax:Heart is mildly enlarged.    Calcified pleural plaques posteriorly.    - Lung bases: No infiltrates and no nodules.    - Liver: Mild cirrhotic changes of the liver.  No focal abnormality.  Recommend clinical correlation.  Calcified granuloma is noted.    - Gallbladder: Status post cholecystectomy.    - Bile Ducts: No evidence of intra or extra hepatic biliary ductal dilation.    - Spleen: Negative.    - Kidneys: No mass or hydronephrosis.    - Adrenals: Unremarkable.    - Pancreas: No mass or peripancreatic fat stranding.    - Retroperitoneum:  No significant adenopathy.    - Vascular: No abdominal aortic aneurysm.    - Abdominal wall:  Unremarkable.    Pelvis:    Mild diffuse urinary bladder wall thickening.  No focal abnormality.    The appendix is within normal limits.    Bowel/Mesentery:    No evidence of bowel obstruction or inflammation.  Mild retained feces in the colon.    Bones:  No acute osseous abnormality and no suspicious lytic or blastic lesion.    Severe osteoarthritic changes of the left hip with joint space narrowing and subcortical cystic changes the femoral head and acetabulum.  Moderate-severe degenerative changes of the right hip.                                       CTA Chest Non-Coronary (PE Studies) (Final result)  Result time 04/07/25 22:55:20      Final result by Chava Nick MD (04/07/25 22:55:20)                   Impression:      1. No evidence of pulmonary embolism  2. Mild cardiomegaly.  3. Probable mild cirrhotic changes of the liver incompletely visualized.  Recommend clinical correlation.  4. Calcified pleural plaque  posteriorly on the right could be associated with asbestos exposure.      Electronically signed by: Chava Agrawal  Date:    04/07/2025  Time:    22:55               Narrative:    EXAMINATION:  CTA CHEST NON CORONARY (PE STUDIES)    CLINICAL HISTORY:  Pulmonary embolism (PE) suspected, positive D-dimer;    TECHNIQUE:  Low dose axial images, sagittal and coronal reformations were obtained from the thoracic inlet to the lung bases following the IV administration of 100 mL of Omnipaque 350.  Contrast timing was optimized to evaluate the pulmonary arteries.  MIP images were performed.    COMPARISON:  07/15/2024    FINDINGS:  Pulmonary arteries:Pulmonary arteries are adequately enhanced.No filling defects to indicate pulmonary embolism.    Thoracic soft tissues: Unremarkable.    Aorta: Left-sided aortic arch.  No aneurysm or dissection.    Heart: Heart is mildly enlarged.    Marisela/Mediastinum: No pathologic claudine enlargement.    Airways: Patent.    Lungs/Pleura: Clear lungs.  No mass or consolidation.  No effusion or pneumothorax.    Calcified pleural plaque posteriorly on the right.    Esophagus: Unremarkable.    Upper Abdomen: Probable mild cirrhotic changes of the liver.  Recommend clinical correlation.    Bones: No acute fracture. No suspicious lytic or sclerotic lesions.                                       X-Ray Chest AP Portable (Final result)  Result time 04/07/25 20:09:21      Final result by Antoine Suárez MD (04/07/25 20:09:21)                   Impression:      Cardiomegaly with pulmonary vascular congestion, suggestive of mild pulmonary edema secondary to CHF.    Bibasilar airspace opacities.  The findings may reflect confluent pulmonary edema.      Electronically signed by: Antoine Suárez MD  Date:    04/07/2025  Time:    20:09               Narrative:    EXAMINATION:  XR CHEST AP PORTABLE    CLINICAL HISTORY:  sob/cough;    TECHNIQUE:  Single frontal view of the chest was  performed.    COMPARISON:  12/28/2024.    FINDINGS:  Monitoring EKG leads are present.  The trachea is unremarkable.  There are calcifications of the aortic knob.  The cardiomediastinal silhouette is enlarged.  There are no pleural effusions.  There is no evidence of a pneumothorax.  There is no evidence of pneumomediastinum.  There are bibasilar airspace opacities.  There is pulmonary vascular congestion.    There is no evidence of free air beneath the hemidiaphragms.  There are degenerative changes in the osseous structures.                                       Medications   magnesium sulfate 2g in water 50mL IVPB (premix) (2 g Intravenous New Bag 4/8/25 0119)   famotidine (PF) injection 20 mg (20 mg Intravenous Given 4/8/25 0115)   albuterol nebulizer solution 2.5 mg (has no administration in time range)   albuterol-ipratropium 2.5 mg-0.5 mg/3 mL nebulizer solution 3 mL (3 mLs Nebulization Given 4/7/25 1954)   furosemide injection 20 mg (20 mg Intravenous Given 4/7/25 2125)   iohexoL (OMNIPAQUE 350) injection 100 mL (100 mLs Intravenous Given 4/7/25 2208)   cefTRIAXone injection 1 g (1 g Intravenous Given 4/7/25 2235)   methylPREDNISolone sodium succinate injection 125 mg (125 mg Intravenous Given 4/8/25 0021)     Medical Decision Making  Amount and/or Complexity of Data Reviewed  Independent Historian: EMS  External Data Reviewed: notes.  Labs: ordered. Decision-making details documented in ED Course.  Radiology: ordered and independent interpretation performed. Decision-making details documented in ED Course.  ECG/medicine tests: ordered and independent interpretation performed. Decision-making details documented in ED Course.    Risk  Prescription drug management.  Decision regarding hospitalization.                     Patient presents to ED as above.  Differential includes not limited to COPD exacerbation, CHF exacerbation, viral URI, pneumonia, ACS, PE, dissection, UTI, kidney stone, diverticulitis or  other acute intra-abdominal process.  Chest x-ray independently reviewed by me and per radiology read, shows Cardiomegaly with pulmonary vascular congestion, suggestive of mild pulmonary edema secondary to CHF.  Bibasilar airspace opacities.    Labs significant for normal WBC count, normal hemoglobin, stable electrolytes and renal function, BNP mildly elevated at 147, initial high sensitivity troponin 12.2, flu and COVID negative, D-dimer elevated at 1.43, UA suggestive of UTI with 3+ leukocyte esterase, greater than 100 WBCs.  Lactic acid 1.05.  CT of the chest and abdomen/pelvis obtained for further evaluation and per radiology read, shows no evidence of PE.  Mild cardiomegaly.  Probable mild cirrhotic changes of the liver incompletely visualized.  Recommend clinical correlation.  Calcified pleural plaque posteriorly on the right.  Findings suggestive of cystitis.  Patient given DuoNeb, Lasix 20 mg IV x1, 1 g Rocephin and 125 mg Solu-Medrol in the ED. He states he overall does not feel well for discharge home.  Will admit to hospitalist for further management and workup.              Clinical Impression:  Final diagnoses:  [R06.02] SOB (shortness of breath)  [N39.0] Urinary tract infection without hematuria, site unspecified (Primary)  [I50.9] Acute on chronic congestive heart failure, unspecified heart failure type  [J44.9] Chronic obstructive pulmonary disease, unspecified COPD type  [R53.1] Generalized weakness          ED Disposition Condition    Observation Stable                    [1]   Social History  Tobacco Use    Smoking status: Every Day     Current packs/day: 1.00     Average packs/day: 1 pack/day for 30.0 years (30.0 ttl pk-yrs)     Types: Cigarettes   Substance Use Topics    Alcohol use: Not Currently    Drug use: Yes     Types: Marijuana     Comment: 3 to 4 times a week        Yoli Cortez MD  04/08/25 0123

## 2025-04-08 NOTE — DISCHARGE SUMMARY
Critical access hospital Medicine  Discharge Summary      Patient Name: Renard Jimenez  MRN: [id number]  PRISCILLA: [id number]  Patient Class: OP- Observation  Admission Date: 4/7/2025  Hospital Length of Stay: 0 days  Discharge Date and Time: 04/08/2025 9:08 AM  Attending Physician: Alex Thibodeaux, *   Discharging Provider: Massimo Robles NP  Primary Care Provider: Juancho Ortiz MD    Primary Care Team: Networked reference to record PCT     HPI:   59 male Pt w/ PMHx COPD on chronic oxygen therapy (1.5-2L), HFpEF, HLD, hypertension, obesity, current smoker who presents to the ED by ambulance with generalized weakness, fatigue, dizziness described as a lightheaded/presyncope feeling that has increased since yesterday.  Patient states he has had increased shortness of breath at baseline  with productive cough for the last 2-3 days.  He reports subjective fever and chills.  He has occasional chest pain, described as tightness that comes and goes.  He reports some increased orthopnea and increased lower extremity edema from baseline.  He does report some chronic abdominal pain that has been ongoing for a couple of months and states he is overdue for a colonoscopy.  He denies any nausea or vomiting.  He does report some chronic constipation.  He also reports some burning with urination for a few weeks.  He can not identify any exacerbating or alleviating factors.  He reports compliance with his medications.  He denies any recent antibiotic or steroid use.  Oxygen saturation low 90s with EMS on chronic home oxygen therapy.       He is on 1.5 liters oxygen at home    Still smokes   ( did not want nicotine patch )     Drinks some days but not every day           In our ER     Labs showed mild elevated WBC     UA shows UTI     Chemistry was normal       CTA shows no PE and clear lungs      COVID FLU both NEG       BNP mildly up in 100-200 range         He told me he has no problem laying flat       They  gave him some IV lasix     And IV steroids and Nebs     Also got IV rocephin for the UTI         We are admitting for COPD exacerbation     * No surgery found *      Hospital Course:   During the hospitalization the patient was admitted to [facility] for evaluation and management of acute cystitis as well as a COPD exacerbation.  Urinalysis was remarkable for leukocyte esterase but negative for nitrites, he was started and given Rocephin.  He was also treated for COPD exacerbation, he was given IV steroids.  His imaging did not reveal anything significant but did confirm the possibility of acute cystitis.  He has remained on his baseline O2 of 1.5 L, no longer endorses shortness of breath, wheezing has resolved.  He was given a completion course of antibiotics as well as a tapered course of steroids on discharge.  He was seen and examined on the day of discharge was medically stable.     Goals of Care Treatment Preferences:  Code Status: Full Code         Consults:     Assessment & Plan  Tobacco abuse    Still smokes    Did not want nicotine patch   Essential hypertension  Patient's blood pressure range in the last 24 hours was: BP  Min: 161/74  Max: 189/85.The patient's inpatient anti-hypertensive regimen is listed below:  Current Antihypertensives  furosemide injection 20 mg, Daily, Intravenous  losartan tablet 50 mg, Daily, Oral  hydrALAZINE tablet 50 mg, 3 times daily, Oral    Plan  - BP is uncontrolled, will adjust as follows: BP High right now    - I will add something for HTN     Maybe Hydralazine PO TID  start 50 mg   COPD (chronic obstructive pulmonary disease)  On home oxygen     On same level here now  1.5 - 2 liters     Has wheezing all over but no distress on the floor when I saw him     CTA chest was clear     I checked procalcitonin and it was NORMAL       PLAN    - copd pathway order set is in     - IV Steroids  Solumedrol 40 mg Q8  IV       - Duonebs QID     - albuterol Q4 PRN    - Robitussin 400 mg  po QID     - IV lasix 20 mg Daily    ( I do not think this is CHF but might help a little since BNP was up some )      - continuous pulse oximetry if they can on that floor     NC Oxygen as needed   Urinary tract infection without hematuria    IV rocephin 1 Gram Q24 hours x 3 doses   Final Active Diagnoses:    Diagnosis Date Noted POA    Urinary tract infection without hematuria [N39.0] 04/08/2025 Yes    COPD (chronic obstructive pulmonary disease) [J44.9] 01/02/2025 Yes    Essential hypertension [I10] 10/08/2019 Yes     Chronic    Tobacco abuse [Z72.0] 08/28/2013 Yes     Chronic      Problems Resolved During this Admission:    Diagnosis Date Noted Date Resolved POA    PRINCIPAL PROBLEM:  COPD exacerbation [J44.1] 04/08/2025 04/08/2025 Yes       Discharged Condition: stable    Disposition:     Follow Up:    Patient Instructions:   No discharge procedures on file.    Significant Diagnostic Studies: N/A    Pending Diagnostic Studies:       Procedure Component Value Units Date/Time    EXTRA TUBES [[id number]] Collected: 04/07/25 1955    Order Status: Sent Lab Status: In process Updated: 04/07/25 1955    Specimen: Blood, Venous     Narrative:      The following orders were created for panel order EXTRA TUBES.  Procedure                               Abnormality         Status                     ---------                               -----------         ------                     Light Green Top Hold[[id number]]                            In process                   Please view results for these tests on the individual orders.           Medications:  Reconciled Home Medications:      Medication List        START taking these medications      cefdinir 300 MG capsule  Commonly known as: OMNICEF  Take 1 capsule (300 mg total) by mouth 2 (two) times daily. for 7 days     predniSONE 20 MG tablet  Commonly known as: DELTASONE  Take 2 tablets (40 mg total) by mouth once daily for 3 days, THEN 1.5 tablets (30 mg total) once  daily for 3 days, THEN 1 tablet (20 mg total) once daily for 3 days, THEN 0.5 tablets (10 mg total) once daily for 3 days.  Start taking on: April 8, 2025            CHANGE how you take these medications      clopidogreL 75 mg tablet  Commonly known as: PLAVIX  Take 1 tablet (75 mg total) by mouth once daily.  What changed: when to take this            CONTINUE taking these medications      acetaminophen 500 MG tablet  Commonly known as: TYLENOL  Take 2 tablets (1,000 mg total) by mouth every 6 (six) hours as needed for Pain.     * albuterol 90 mcg/actuation inhaler  Commonly known as: PROVENTIL/VENTOLIN HFA  Inhale 2 puffs into the lungs every 6 (six) hours as needed.     * albuterol 2.5 mg /3 mL (0.083 %) nebulizer solution  Commonly known as: PROVENTIL  Take 2.5 mg by nebulization every 4 (four) hours as needed.     aspirin 81 MG Chew  Take 1 tablet (81 mg total) by mouth once daily.     atorvastatin 80 MG tablet  Commonly known as: LIPITOR  Take 1 tablet (80 mg total) by mouth once daily.     budesonide-formoterol 160-4.5 mcg 160-4.5 mcg/actuation Hfaa  Commonly known as: SYMBICORT  Inhale 2 puffs into the lungs 2 (two) times daily.     buPROPion 150 MG TB24 tablet  Commonly known as: WELLBUTRIN XL  Take 150 mg by mouth once daily.     cholecalciferol (vitamin D3) 1,250 mcg (50,000 unit) capsule  Take 1 capsule by mouth every 7 days.     diclofenac 75 MG EC tablet  Commonly known as: VOLTAREN  Take 75 mg by mouth.     famotidine 40 MG tablet  Commonly known as: PEPCID  Take 40 mg by mouth daily as needed for Heartburn.     fluticasone furoate-vilanteroL 200-25 mcg/dose Dsdv diskus inhaler  Commonly known as: BREO  Breo Ellipta 200 mcg-25 mcg/dose powder for inhalation   Inhale by inhalation route for 30 days.     fluticasone propionate 50 mcg/actuation nasal spray  Commonly known as: FLONASE  1 spray by Each Nostril route once daily.     gabapentin 300 MG capsule  Commonly known as: NEURONTIN  Take 300 mg by  mouth 2 (two) times daily.     hydroCHLOROthiazide 25 MG tablet  Commonly known as: HYDRODIURIL  Take 1 tablet by mouth once daily.     LIDOcaine-prilocaine cream  Commonly known as: EMLA  Apply 1 g topically daily as needed.     losartan 50 MG tablet  Commonly known as: COZAAR  Take 50 mg by mouth once daily.     metoprolol tartrate 25 MG tablet  Commonly known as: LOPRESSOR  Take 1 tablet (25 mg total) by mouth 2 (two) times daily.     montelukast 10 mg tablet  Commonly known as: SINGULAIR  Take 1 tablet by mouth once daily.     mupirocin 2 % ointment  Commonly known as: BACTROBAN  Apply topically 3 (three) times daily.     pantoprazole 40 MG tablet  Commonly known as: PROTONIX  Take 40 mg by mouth once daily.     traMADoL 50 mg tablet  Commonly known as: ULTRAM  Take 1 tablet (50 mg total) by mouth every 6 (six) hours as needed for Pain.     TRELEGY ELLIPTA 200-62.5-25 mcg inhaler  Generic drug: fluticasone-umeclidin-vilanter  Inhale 1 puff into the lungs once daily.     valACYclovir 1000 MG tablet  Commonly known as: VALTREX  Take 1 tablet (1,000 mg total) by mouth every 12 (twelve) hours. for 7 days           * This list has 2 medication(s) that are the same as other medications prescribed for you. Read the directions carefully, and ask your doctor or other care provider to review them with you.                  Indwelling Lines/Drains at time of discharge:   Lines/Drains/Airways       None                   Time spent on the discharge of patient: 33 minutes         Massimo Robles NP  Department of Hospital Medicine  [facility]

## 2025-04-08 NOTE — SUBJECTIVE & OBJECTIVE
Past Medical History:   Diagnosis Date    CHF (congestive heart failure)     COPD (chronic obstructive pulmonary disease)     HLD (hyperlipidemia)     Hypertension     MI, old        Past Surgical History:   Procedure Laterality Date    CAROTID ENDARTERECTOMY Left 11/17/2022    Procedure: ENDARTERECTOMY-CAROTID;  Surgeon: ELIDA Guadalupe II, MD;  Location: [facility] OR [address] [place] [address];  Service: Vascular;  Laterality: Left;    LAPAROSCOPIC CHOLECYSTECTOMY N/A 4/11/2022    Procedure: CHOLECYSTECTOMY, LAPAROSCOPIC;  Surgeon: Neville Hutchinson MD;  Location: [facility];  Service: General;  Laterality: N/A;    TONSILLECTOMY         Review of patient's allergies indicates:   Allergen Reactions    Dog dander Hives    House dust Hives    Maple flavor Other (See Comments)     Throat swelling      Pollen extracts Hives       Current Facility-Administered Medications on File Prior to Encounter   Medication    diphenhydrAMINE injection 25 mg    HYDROmorphone injection 0.2 mg    prochlorperazine injection Soln 5 mg    sodium chloride 0.9% flush 10 mL     Current Outpatient Medications on File Prior to Encounter   Medication Sig    acetaminophen (TYLENOL) 500 MG tablet Take 2 tablets (1,000 mg total) by mouth every 6 (six) hours as needed for Pain.    albuterol (PROVENTIL) 2.5 mg /3 mL (0.083 %) nebulizer solution Take 2.5 mg by nebulization every 4 (four) hours as needed.    albuterol (PROVENTIL/VENTOLIN HFA) 90 mcg/actuation inhaler Inhale 2 puffs into the lungs every 6 (six) hours as needed.    aspirin 81 MG Chew Take 1 tablet (81 mg total) by mouth once daily.    atorvastatin (LIPITOR) 80 MG tablet Take 1 tablet (80 mg total) by mouth once daily.    budesonide-formoterol 160-4.5 mcg (SYMBICORT) 160-4.5 mcg/actuation HFAA Inhale 2 puffs into the lungs 2 (two) times daily.    buPROPion (WELLBUTRIN XL) 150 MG TB24 tablet Take 150 mg by mouth once daily.    cholecalciferol, vitamin D3, 1,250 mcg (50,000 unit) capsule Take 1 capsule by mouth  every 7 days.    clopidogreL (PLAVIX) 75 mg tablet Take 1 tablet (75 mg total) by mouth once daily. (Patient taking differently: Take 75 mg by mouth every evening.)    diclofenac (VOLTAREN) 75 MG EC tablet Take 75 mg by mouth.    famotidine (PEPCID) 40 MG tablet Take 40 mg by mouth daily as needed for Heartburn.    fluticasone furoate-vilanteroL (BREO) 200-25 mcg/dose DsDv diskus inhaler Breo Ellipta 200 mcg-25 mcg/dose powder for inhalation   Inhale by inhalation route for 30 days.    fluticasone propionate (FLONASE) 50 mcg/actuation nasal spray 1 spray by Each Nostril route once daily.    gabapentin (NEURONTIN) 300 MG capsule Take 300 mg by mouth 2 (two) times daily.    hydroCHLOROthiazide (HYDRODIURIL) 25 MG tablet Take 1 tablet by mouth once daily.    LIDOcaine-prilocaine (EMLA) cream Apply 1 g topically daily as needed.    losartan (COZAAR) 50 MG tablet Take 50 mg by mouth once daily.    metoprolol tartrate (LOPRESSOR) 25 MG tablet Take 1 tablet (25 mg total) by mouth 2 (two) times daily.    montelukast (SINGULAIR) 10 mg tablet Take 1 tablet by mouth once daily.    mupirocin (BACTROBAN) 2 % ointment Apply topically 3 (three) times daily.    pantoprazole (PROTONIX) 40 MG tablet Take 40 mg by mouth once daily.    traMADoL (ULTRAM) 50 mg tablet Take 1 tablet (50 mg total) by mouth every 6 (six) hours as needed for Pain.    TRELEGY ELLIPTA 200-62.5-25 mcg inhaler Inhale 1 puff into the lungs once daily.    valACYclovir (VALTREX) 1000 MG tablet Take 1 tablet (1,000 mg total) by mouth every 12 (twelve) hours. for 7 days     Family History       Problem Relation (Age of Onset)    Hypertension Father          Tobacco Use    Smoking status: Every Day     Current packs/day: 1.00     Average packs/day: 1 pack/day for 30.0 years (30.0 ttl pk-yrs)     Types: Cigarettes    Smokeless tobacco: Not on file   Substance and Sexual Activity    Alcohol use: Not Currently    Drug use: Yes     Types: Marijuana     Comment: 3 to 4  times a week    Sexual activity: Yes     Partners: Female     Review of Systems   Respiratory:  Positive for shortness of breath and wheezing.      Objective:     Vital Signs (Most Recent):  Temp: 98.3 °F (36.8 °C) (04/08/25 0345)  Pulse: 88 (04/08/25 0346)  Resp: 18 (04/08/25 0346)  BP: (!) 187/102 (04/08/25 0345)  SpO2: 95 % (04/08/25 0015) Vital Signs (24h Range):  Temp:  [98.3 °F (36.8 °C)-98.7 °F (37.1 °C)] 98.3 °F (36.8 °C)  Pulse:  [] 88  Resp:  [18-23] 18  SpO2:  [93 %-97 %] 95 %  BP: (161-189)/() 187/102     Weight: 127.1 kg (280 lb 3.3 oz)  Body mass index is 41.38 kg/m².     Physical Exam  Vitals and nursing note reviewed.   Constitutional:       Appearance: Normal appearance.   HENT:      Head: Normocephalic.      Nose: Nose normal.      Mouth/Throat:      Mouth: Mucous membranes are moist.   Eyes:      Extraocular Movements: Extraocular movements intact.      Pupils: Pupils are equal, round, and reactive to light.   Cardiovascular:      Rate and Rhythm: Normal rate and regular rhythm.      Pulses: Normal pulses.      Heart sounds: Normal heart sounds.   Pulmonary:      Effort: Pulmonary effort is normal.      Breath sounds: Wheezing present.   Abdominal:      General: Abdomen is flat. Bowel sounds are normal.      Palpations: Abdomen is soft.   Musculoskeletal:         General: Normal range of motion.      Cervical back: Normal range of motion.   Skin:     General: Skin is warm.      Capillary Refill: Capillary refill takes less than 2 seconds.   Neurological:      General: No focal deficit present.      Mental Status: He is alert and oriented to person, place, and time.   Psychiatric:         Mood and Affect: Mood normal.         Behavior: Behavior normal.              CRANIAL NERVES     CN III, IV, VI   Pupils are equal, round, and reactive to light.       Significant Labs: All pertinent labs within the past 24 hours have been reviewed.  CBC:   Recent Labs   Lab 04/07/25 1945  "04/08/25 0455   WBC 9.86 12.01   HGB 16.6 17.6   HCT 52.1 55.3*    194     CMP:   Recent Labs   Lab 04/07/25 1945 04/08/25 0455    135*   K 4.4 4.2   CO2 31* 33*   BUN 13 13   CREATININE 1.0 1.0   CALCIUM 9.0 9.5   ALBUMIN 3.8 4.2   BILITOT 0.7 0.9   ALKPHOS 104 103   AST 17 16   ALT 20 22     Cardiac Markers:   Recent Labs   Lab 04/07/25 1945   *     Coagulation: No results for input(s): "PT", "INR", "APTT" in the last 48 hours.  Lactic Acid: No results for input(s): "LACTATE" in the last 48 hours.  Lipase:   Recent Labs   Lab 04/07/25 1945   LIPASE 7     Lipid Panel: No results for input(s): "CHOL", "HDL", "LDLCALC", "TRIG", "CHOLHDL" in the last 48 hours.  Magnesium:   Recent Labs   Lab 04/07/25 1945 04/08/25 0455   MG 1.7 2.1     Troponin: No results for input(s): "TROPONINI", "TROPONINIHS" in the last 48 hours.  TSH: No results for input(s): "TSH" in the last 4320 hours.  Urine Studies:   Recent Labs   Lab 04/07/25 2129   APPEARANCEUA Cloudy*   SPECGRAV 1.020   PROTEINUA Trace*   BILIRUBINUA Negative   UROBILINOGEN 2.0-3.0*   LEUKOCYTESUR 3+*   RBCUA 16*   WBCUA >100*   BACTERIA Few*       Significant Imaging: I have reviewed all pertinent imaging results/findings within the past 24 hours.  I have reviewed and interpreted all pertinent imaging results/findings within the past 24 hours.  "

## 2025-04-08 NOTE — HPI
59 male Pt w/ PMHx COPD on chronic oxygen therapy (1.5-2L), HFpEF, HLD, hypertension, obesity, current smoker who presents to the ED by ambulance with generalized weakness, fatigue, dizziness described as a lightheaded/presyncope feeling that has increased since yesterday.  Patient states he has had increased shortness of breath at baseline  with productive cough for the last 2-3 days.  He reports subjective fever and chills.  He has occasional chest pain, described as tightness that comes and goes.  He reports some increased orthopnea and increased lower extremity edema from baseline.  He does report some chronic abdominal pain that has been ongoing for a couple of months and states he is overdue for a colonoscopy.  He denies any nausea or vomiting.  He does report some chronic constipation.  He also reports some burning with urination for a few weeks.  He can not identify any exacerbating or alleviating factors.  He reports compliance with his medications.  He denies any recent antibiotic or steroid use.  Oxygen saturation low 90s with EMS on chronic home oxygen therapy.       He is on 1.5 liters oxygen at home    Still smokes   ( did not want nicotine patch )     Drinks some days but not every day           In our ER     Labs showed mild elevated WBC     UA shows UTI     Chemistry was normal       CTA shows no PE and clear lungs      COVID FLU both NEG       BNP mildly up in 100-200 range         He told me he has no problem laying flat       They gave him some IV lasix     And IV steroids and Nebs     Also got IV rocephin for the UTI         We are admitting for COPD exacerbation

## 2025-04-08 NOTE — PLAN OF CARE
Problem: COPD (Chronic Obstructive Pulmonary Disease)  Goal: Optimal Chronic Illness Coping  Outcome: Not Progressing  Goal: Optimal Level of Functional [place]  Outcome: Not Progressing  Goal: Absence of Infection Signs and Symptoms  Outcome: Not Progressing  Goal: Improved Oral Intake  Outcome: Not Progressing  Goal: Effective Oxygenation and Ventilation  Outcome: Not Progressing

## 2025-04-09 LAB — BACTERIA UR CULT: ABNORMAL

## 2025-04-12 LAB
BACTERIA BLD CULT: NORMAL
BACTERIA BLD CULT: NORMAL
OHS QRS DURATION: 96 MS
OHS QTC CALCULATION: 466 MS

## 2025-05-19 DIAGNOSIS — J44.9 CHRONIC OBSTRUCTIVE PULMONARY DISEASE, UNSPECIFIED COPD TYPE: Primary | ICD-10-CM

## 2025-07-07 NOTE — PROGRESS NOTES
C3 nurse spoke with Renard Jimenez  for a TCC post hospital discharge follow up call. The patient does not have a scheduled HOSFU appointment with Juancho Ortiz MD  within 5-7 days post hospital discharge date 11/18/2022. C3 nurse was unable to schedule HOSFU appointment in [facility].    Non [facility] PCP/ NP referral place.       
None

## (undated) DEVICE — CANNULA ENDOPATH XCEL 5X100MM

## (undated) DEVICE — SYR 3CC LUER LOC

## (undated) DEVICE — GLOVE SURG ULTRA TOUCH 7.5

## (undated) DEVICE — SUT SILK 3-0 SH 18IN BLACK

## (undated) DEVICE — NDL INSUF ULTRA VERESS 120MM

## (undated) DEVICE — LOOP VESSEL YELLOW MAXI

## (undated) DEVICE — UNDERGLOVE BIOGEL PI SZ 6.5 LF

## (undated) DEVICE — SEE MEDLINE ITEM 157117

## (undated) DEVICE — KIT ANTIFOG W/SPONG & FLUID

## (undated) DEVICE — SCISSOR 5MMX35CM DIRECT DRIVE

## (undated) DEVICE — SUT 2-0 12-18IN SILK

## (undated) DEVICE — IMPLANTABLE DEVICE
Type: IMPLANTABLE DEVICE | Site: CAROTID | Status: NON-FUNCTIONAL
Removed: 2022-11-17

## (undated) DEVICE — ELECTRODE BLD 1 INCH TEFLON

## (undated) DEVICE — CLOSURE SKIN STERI STRIP 1/2X4

## (undated) DEVICE — SUT MONOCRYL 4-0 PS-2

## (undated) DEVICE — APPLIER CLIP EPIX UNIV 5X34

## (undated) DEVICE — ELECTRODE REM PLYHSV RETURN 9

## (undated) DEVICE — BAG TISS RETRV MONARCH 10MM

## (undated) DEVICE — SUT MCRYL PLUS 4-0 PS2 27IN

## (undated) DEVICE — HEMOSTAT SURGICEL PWD 3G

## (undated) DEVICE — NDL SAFETY 21G X 1 1/2 ECLPSE

## (undated) DEVICE — KIT EVACUATOR FULL PERF 100CC

## (undated) DEVICE — DRESSING TELFA STRL 4X3 LF

## (undated) DEVICE — TROCAR ENDOPATH XCEL 5X100MM

## (undated) DEVICE — DRAPE INCISE IOBAN 2 23X17IN

## (undated) DEVICE — SET TUBE PNEUMOCLEAR SE HI FLO

## (undated) DEVICE — SET DECANTER MEDICHOICE

## (undated) DEVICE — LINER SUCTION 3000CC

## (undated) DEVICE — NDL HYPODERMIC 30GX1IN

## (undated) DEVICE — SUT LIGACLIP SMALL XTRA

## (undated) DEVICE — SPONGE WEC CEL SPEARS

## (undated) DEVICE — SLEEVE SCD EXPRESS KNEE MEDIUM

## (undated) DEVICE — TROCAR KII FIOS 5MM X 100MM

## (undated) DEVICE — DRAPE STERI INSTRUMENT 1018

## (undated) DEVICE — SUT PROLENE 6-0 BV-1 30IN

## (undated) DEVICE — APPLICATOR CHLORAPREP ORN 26ML

## (undated) DEVICE — STRAP OR TABLE 5IN X 72IN

## (undated) DEVICE — SOL WATER STRL IRR 1000ML

## (undated) DEVICE — INSERTS STEALTH FIBRA SIZE 1.

## (undated) DEVICE — PACK DRAPE UNIVERSAL CONVERTOR

## (undated) DEVICE — TROCAR ENDOPATH XCEL 11MM 10CM

## (undated) DEVICE — SUT 0 VICRYL / UR6 (J603)

## (undated) DEVICE — APPLICATOR SURGICEL ENDOSCOPIC

## (undated) DEVICE — GAUZE SPONGE PEANUT STRL

## (undated) DEVICE — COVER LIGHT HANDLE 80/CA

## (undated) DEVICE — DISSECTOR CURVED 5DCD

## (undated) DEVICE — BLADE SURG CARBON STEEL SZ11

## (undated) DEVICE — TRAY PERIPHERAL VASCULAR OMC

## (undated) DEVICE — TOWEL OR DISP STRL BLUE 4/PK

## (undated) DEVICE — SPONGE GAUZE 16PLY 4X4

## (undated) DEVICE — BLADE SCALP OPHTL BEVEL STR

## (undated) DEVICE — DRESSING TELFA N ADH 3X8

## (undated) DEVICE — GLOVE SURG ULTRA TOUCH 6

## (undated) DEVICE — SUT 4-0 12-18IN SILK BLACK

## (undated) DEVICE — SYR 10CC LUER LOCK

## (undated) DEVICE — SUT 3-0 12-18IN SILK

## (undated) DEVICE — PACK CUSTOM ENDO CHOLO SLI

## (undated) DEVICE — DRESSING TRANS 4X4 TEGADERM